# Patient Record
Sex: FEMALE | Race: WHITE | Employment: OTHER | URBAN - METROPOLITAN AREA
[De-identification: names, ages, dates, MRNs, and addresses within clinical notes are randomized per-mention and may not be internally consistent; named-entity substitution may affect disease eponyms.]

---

## 2017-02-24 ENCOUNTER — HOSPITAL ENCOUNTER (OUTPATIENT)
Dept: LAB | Age: 82
Discharge: HOME | End: 2017-02-24
Payer: MEDICARE

## 2017-02-24 PROCEDURE — 88305 TISSUE EXAM BY PATHOLOGIST: CPT | Performed by: PLASTIC SURGERY

## 2017-03-13 RX ORDER — LEVOCETIRIZINE DIHYDROCHLORIDE 5 MG/1
5 TABLET, FILM COATED ORAL DAILY
Qty: 30 TAB | Refills: 5 | Status: SHIPPED | OUTPATIENT
Start: 2017-03-13 | End: 2018-04-19 | Stop reason: ALTCHOICE

## 2017-04-18 ENCOUNTER — HOSPITAL ENCOUNTER (OUTPATIENT)
Dept: LAB | Age: 82
Discharge: HOME | End: 2017-04-18
Payer: MEDICARE

## 2017-04-18 ENCOUNTER — LAB ONLY (OUTPATIENT)
Dept: INTERNAL MEDICINE CLINIC | Age: 82
End: 2017-04-18

## 2017-04-18 DIAGNOSIS — R63.5 ABNORMAL WEIGHT GAIN: ICD-10-CM

## 2017-04-18 DIAGNOSIS — G89.29 CHRONIC LOW BACK PAIN WITHOUT SCIATICA, UNSPECIFIED BACK PAIN LATERALITY: ICD-10-CM

## 2017-04-18 DIAGNOSIS — G89.29 CHRONIC LOW BACK PAIN WITHOUT SCIATICA, UNSPECIFIED BACK PAIN LATERALITY: Primary | ICD-10-CM

## 2017-04-18 DIAGNOSIS — M54.50 CHRONIC LOW BACK PAIN WITHOUT SCIATICA, UNSPECIFIED BACK PAIN LATERALITY: Primary | ICD-10-CM

## 2017-04-18 DIAGNOSIS — M54.50 CHRONIC LOW BACK PAIN WITHOUT SCIATICA, UNSPECIFIED BACK PAIN LATERALITY: ICD-10-CM

## 2017-04-18 LAB
BASOPHILS # BLD AUTO: 0 K/UL (ref 0–0.06)
BASOPHILS # BLD: 1 % (ref 0–2)
CHOLEST SERPL-MCNC: 164 MG/DL
DIFFERENTIAL METHOD BLD: ABNORMAL
EOSINOPHIL # BLD: 0.1 K/UL (ref 0–0.4)
EOSINOPHIL NFR BLD: 2 % (ref 0–5)
ERYTHROCYTE [DISTWIDTH] IN BLOOD BY AUTOMATED COUNT: 13.4 % (ref 11.6–14.5)
HCT VFR BLD AUTO: 39.6 % (ref 35–45)
HDLC SERPL-MCNC: 60 MG/DL (ref 40–60)
HDLC SERPL: 2.7 {RATIO} (ref 0–5)
HGB BLD-MCNC: 12.5 G/DL (ref 12–16)
LDLC SERPL CALC-MCNC: 90.8 MG/DL (ref 0–100)
LIPID PROFILE,FLP: NORMAL
LYMPHOCYTES # BLD AUTO: 25 % (ref 21–52)
LYMPHOCYTES # BLD: 0.8 K/UL (ref 0.9–3.6)
MCH RBC QN AUTO: 31 PG (ref 24–34)
MCHC RBC AUTO-ENTMCNC: 31.6 G/DL (ref 31–37)
MCV RBC AUTO: 98.3 FL (ref 74–97)
MONOCYTES # BLD: 0.4 K/UL (ref 0.05–1.2)
MONOCYTES NFR BLD AUTO: 12 % (ref 3–10)
NEUTS SEG # BLD: 1.8 K/UL (ref 1.8–8)
NEUTS SEG NFR BLD AUTO: 60 % (ref 40–73)
PLATELET # BLD AUTO: 191 K/UL (ref 135–420)
PMV BLD AUTO: 11.5 FL (ref 9.2–11.8)
RBC # BLD AUTO: 4.03 M/UL (ref 4.2–5.3)
TRIGL SERPL-MCNC: 66 MG/DL (ref ?–150)
TSH SERPL DL<=0.05 MIU/L-ACNC: 1.49 UIU/ML (ref 0.36–3.74)
VLDLC SERPL CALC-MCNC: 13.2 MG/DL
WBC # BLD AUTO: 3 K/UL (ref 4.6–13.2)

## 2017-04-18 PROCEDURE — 85025 COMPLETE CBC W/AUTO DIFF WBC: CPT | Performed by: INTERNAL MEDICINE

## 2017-04-18 PROCEDURE — 83921 ORGANIC ACID SINGLE QUANT: CPT | Performed by: INTERNAL MEDICINE

## 2017-04-18 PROCEDURE — 80061 LIPID PANEL: CPT | Performed by: INTERNAL MEDICINE

## 2017-04-18 PROCEDURE — 84443 ASSAY THYROID STIM HORMONE: CPT | Performed by: INTERNAL MEDICINE

## 2017-04-19 ENCOUNTER — LAB ONLY (OUTPATIENT)
Dept: INTERNAL MEDICINE CLINIC | Age: 82
End: 2017-04-19

## 2017-04-19 ENCOUNTER — HOSPITAL ENCOUNTER (OUTPATIENT)
Dept: LAB | Age: 82
Discharge: HOME | End: 2017-04-19
Payer: MEDICARE

## 2017-04-19 DIAGNOSIS — R30.0 DYSURIA: Primary | ICD-10-CM

## 2017-04-19 DIAGNOSIS — R30.0 DYSURIA: ICD-10-CM

## 2017-04-19 PROCEDURE — 87086 URINE CULTURE/COLONY COUNT: CPT | Performed by: INTERNAL MEDICINE

## 2017-04-19 PROCEDURE — 87186 SC STD MICRODIL/AGAR DIL: CPT | Performed by: INTERNAL MEDICINE

## 2017-04-19 PROCEDURE — 87077 CULTURE AEROBIC IDENTIFY: CPT | Performed by: INTERNAL MEDICINE

## 2017-04-20 LAB — METHYLMALONATE SERPL-SCNC: 246 NMOL/L (ref 0–378)

## 2017-04-21 LAB
BACTERIA SPEC CULT: ABNORMAL
SERVICE CMNT-IMP: ABNORMAL

## 2017-04-25 ENCOUNTER — OFFICE VISIT (OUTPATIENT)
Dept: INTERNAL MEDICINE CLINIC | Age: 82
End: 2017-04-25

## 2017-04-25 VITALS
WEIGHT: 126 LBS | HEIGHT: 66 IN | BODY MASS INDEX: 20.25 KG/M2 | OXYGEN SATURATION: 97 % | HEART RATE: 65 BPM | DIASTOLIC BLOOD PRESSURE: 78 MMHG | TEMPERATURE: 98.4 F | RESPIRATION RATE: 16 BRPM | SYSTOLIC BLOOD PRESSURE: 110 MMHG

## 2017-04-25 DIAGNOSIS — R44.8 FEELS COLD: ICD-10-CM

## 2017-04-25 DIAGNOSIS — N30.00 ACUTE CYSTITIS WITHOUT HEMATURIA: Primary | ICD-10-CM

## 2017-04-25 RX ORDER — NITROFURANTOIN 25; 75 MG/1; MG/1
CAPSULE ORAL
Qty: 14 CAP | Refills: 0 | Status: SHIPPED | OUTPATIENT
Start: 2017-04-25 | End: 2017-08-08 | Stop reason: SDUPTHER

## 2017-04-25 NOTE — MR AVS SNAPSHOT
Visit Information Date & Time Provider Department Dept. Phone Encounter #  
 4/25/2017  9:30 AM Wade Perez MD [facility] INTERNAL MEDICINE [facility] [phone] [id number] Follow-up Instructions Return in about 6 months (around 10/25/2017). Upcoming Health Maintenance Date Due DTaP/Tdap/Td series (1 - Tdap) 10/27/1958 GLAUCOMA SCREENING Q2Y 10/27/2002 MEDICARE YEARLY EXAM 10/27/2002 Allergies as of 4/25/2017  Review Complete On: 4/25/2017 By: Roseanna Dias LPN Severity Noted Reaction Type Reactions Latex High 07/22/2014    Anaphylaxis Ampicillin Medium  Side Effect Hives Codeine Medium  Side Effect Nausea and Vomiting Doxycycline Medium 06/18/2013   Side Effect Hives States can take with benadryl Macrodantin [Nitrofurantoin Macrocrystalline] Medium  Side Effect Hives Other Medication Medium  Side Effect Hives Thallium dyes Pcn [Penicillins] Medium  Side Effect Hives Other reaction(s): mild rash/itching Sulfa (Sulfonamide Antibiotics) Medium  Side Effect Hives Other reaction(s): mild rash/itching Tetracycline Medium 06/18/2013   Side Effect Hives Scopolamine    Other (comments) Other reaction(s): cardiac dysrhythmia Patient reports A-Fib arrythmia Thallium-201  07/22/2014    Rash Current Immunizations  Reviewed on 7/22/2014 Name Date Influenza High Dose Vaccine PF 10/25/2016 11:08 AM  
 Pneumococcal Conjugate (PCV-13) 12/2/2014 Pneumococcal Vaccine (Unspecified Type) 10/20/2004 Zoster Vaccine, Live 3/4/2008 Not reviewed this visit Vitals BP Pulse Temp Resp Height(growth percentile) 110/78 (BP 1 Location: Right arm, BP Patient Position: Sitting) 65 98.4 °F (36.9 °C) (Tympanic) 16 5' 6\" (1.676 m) Weight(growth percentile) SpO2 BMI OB Status Smoking Status 126 lb (57.2 kg) 97% 20.34 kg/m2 Hysterectomy Never Smoker Vitals History BMI and BSA Data Body Mass Index Body Surface Area  
 20.34 kg/m 2 1.63 m 2 Preferred Pharmacy Pharmacy Name Phone [address] [place] [address] [facility] [phone] Your Updated Medication List  
  
   
This list is accurate as of: 4/25/17 11:00 AM.  Always use your most recent med list.  
  
  
  
  
 ascorbic acid (vitamin C) 1,000 mg tablet Commonly known as:  VITAMIN C Take  by mouth. aspirin 81 mg tablet Take 81 mg by mouth. B12 SL  
by SubLINGual route two (2) times a day. calcium 600 mg Cap Take  by mouth two (2) times a day. CRANBERRY CONCENTRATE PO Take  by mouth two (2) times a day. ergocalciferol 50,000 unit capsule Commonly known as:  VITAMIN D2 Take one cap by mouth twice a month * estradiol 0.01 % (0.1 mg/gram) vaginal cream  
Commonly known as:  ESTRACE Apply small amount to urethral  Opening daily * estradiol 0.5 mg tablet Commonly known as:  ESTRACE  
1 tablet daily FISH OIL 1,000 mg Cap Generic drug:  omega-3 fatty acids-vitamin e Take 1 Cap by mouth two (2) times a day. fluocinoNIDE 0.05 % external solution Commonly known as:  LIDEX Apply to affected area daily or as directed. folic acid 992 mcg tablet Take 400 mcg by mouth daily. glucosamine-chondroitin 2,000-1,200 mg/30 mL Liqd Take  by mouth.  
  
 levocetirizine 5 mg tablet Commonly known as:  [place] Take 1 Tab by mouth daily. magnesium oxide 400 mg tablet Commonly known as:  MAG-OX Take 400 mg by mouth daily. nicotinic acid 500 mg tablet Commonly known as:  NIACIN Take 500 mg by mouth Daily (before breakfast). nitrofurantoin (macrocrystal-monohydrate) 100 mg capsule Commonly known as:  MACROBID  
1 tablet twice per day OTHER  
2 Tabs two (2) times a day. Macular Protect Complete vitamin * Notice: This list has 2 medication(s) that are the same as other medications prescribed for you. Read the directions carefully, and ask your doctor or other care provider to review them with you. Prescriptions Sent to Pharmacy Refills  
 nitrofurantoin, macrocrystal-monohydrate, (MACROBID) 100 mg capsule 0 Si tablet twice per day Class: Normal  
 Pharmacy: [address] [place] [address] #: [phone] Follow-up Instructions Return in about 6 months (around 10/25/2017). Introducing [facility] SERVICES! [facility] [place] introduces [employer] patient portal. Now you can access parts of your medical record, email your doctor's office, and request medication refills online. 1. In your internet browser, go to [email or web address] 2. Click on the First Time User? Click Here link in the Sign In box. You will see the New Member Sign Up page. 3. Enter your Cellwitch Access Code exactly as it appears below. You will not need to use this code after youve completed the sign-up process. If you do not sign up before the expiration date, you must request a new code. · Cellwitch Access Code: THE [facility] [place] Expires: 2017  8:11 PM 
 
4. Enter the last four digits of your Social Security Number (xxxx) and Date of Birth (mm/dd/yyyy) as indicated and click Submit. You will be taken to the next sign-up page. 5. Create a M-Changat ID. This will be your Cellwitch login ID and cannot be changed, so think of one that is secure and easy to remember. 6. Create a Cellwitch password. You can change your password at any time. 7. Enter your Password Reset Question and Answer. This can be used at a later time if you forget your password. 8. Enter your e-mail address. You will receive e-mail notification when new information is available in [address] 9. Click Sign Up.  You can now view and download portions of your medical record. 10. Click the Download Summary menu link to download a portable copy of your medical information. If you have questions, please visit the Frequently Asked Questions section of the Purplu website. Remember, Purplu is NOT to be used for urgent needs. For medical emergencies, dial 911. Now available from your iPhone and Android! Please provide this summary of care documentation to your next provider. Your primary care clinician is listed as Kemal Mills. If you have any questions after today's visit, please call [phone].

## 2017-04-25 NOTE — PROGRESS NOTES
1. Have you been to the ER, urgent care clinic since your last visit? Hospitalized since your last visit? No    2. Have you seen or consulted any other health care providers outside of the [address] [place] [address] since your last visit? Include any pap smears or colon screening.  No

## 2017-04-26 NOTE — PROGRESS NOTES
This is a Subsequent Medicare Annual Wellness Visit providing Personalized Prevention Plan Services (PPPS) (Performed 12 months after initial AWV and PPPS )    I have reviewed the patient's medical history in detail and updated the computerized patient record. History     The patient complains of urinary frequency and nocturia. The patient has had recurrent UTIs. The continues to feel cold. Past Medical History:   Diagnosis Date    Abnormal weight gain     Feels cold     Insect bite of hip, infected     Mitral valve disorder     MVP (mitral valve prolapse)     Nausea & vomiting       Past Surgical History:   Procedure Laterality Date    ENDOSCOPY, COLON, DIAGNOSTIC  03/23/07    mild internal hemorrhoids    HX BREAST LUMPECTOMY      left    HX CATARACT REMOVAL      bilaterally with lens implant    HX HYSTERECTOMY      HX OOPHORECTOMY      HX OTHER SURGICAL      bladder surgery - sling made with muscle from leg    TOTAL KNEE ARTHROPLASTY      right     Current Outpatient Prescriptions   Medication Sig Dispense Refill    nitrofurantoin, macrocrystal-monohydrate, (MACROBID) 100 mg capsule 1 tablet twice per day 14 Cap 0    levocetirizine (XYZAL) 5 mg tablet Take 1 Tab by mouth daily. 30 Tab 5    ascorbic acid (VITAMIN C) 1,000 mg tablet Take  by mouth.  ergocalciferol (VITAMIN D2) 50,000 unit capsule Take one cap by mouth twice a month 6 Cap 5    estradiol (ESTRACE) 0.01 % (0.1 mg/gram) vaginal cream Apply small amount to urethral  Opening daily 85 g 3    OTHER 2 Tabs two (2) times a day. Macular Protect Complete vitamin      omega-3 fatty acids-vitamin e (FISH OIL) 1,000 mg Cap Take 1 Cap by mouth two (2) times a day.  calcium 600 mg Cap Take  by mouth two (2) times a day.  GLUCOSAMINE HCL/CHONDRO RAMIREZ A (GLUCOSAMINE-CHONDROITIN) 2,000-1,200 mg/30 mL Liqd Take  by mouth.  CYANOCOBALAMIN/COBAMAMIDE (B12 SL) by SubLINGual route two (2) times a day.       aspirin 81 mg tablet Take 81 mg by mouth.  magnesium oxide (MAG-OX) 400 mg tablet Take 400 mg by mouth daily.  folic acid 490 mcg tablet Take 400 mcg by mouth daily.  ASCORBIC ACID/VITAMIN E (CRANBERRY CONCENTRATE PO) Take  by mouth two (2) times a day. Allergies   Allergen Reactions    Latex Anaphylaxis    Ampicillin Hives    Codeine Nausea and Vomiting    Doxycycline Hives     States can take with benadryl    Macrodantin [Nitrofurantoin Macrocrystalline] Hives    Other Medication Hives     Thallium dyes    Pcn [Penicillins] Hives     Other reaction(s): mild rash/itching    Sulfa (Sulfonamide Antibiotics) Hives     Other reaction(s): mild rash/itching    Tetracycline Hives    Scopolamine Other (comments)     Other reaction(s): cardiac dysrhythmia  Patient reports A-Fib arrythmia    Thallium-201 Rash     Family History   Problem Relation Age of Onset    Cancer Mother      gastric    Heart Disease Father      Social History   Substance Use Topics    Smoking status: Never Smoker    Smokeless tobacco: Never Used    Alcohol use No     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 No    Do you average more than 7 drinks per week? No      Functional Ability and Level of Safety:     Hearing Loss   mild    Activities of Daily Living   Self-care.    Requires assistance with: no ADLs    Fall Risk     Fall Risk Assessment, last 12 mths 8/30/2016   Able to walk? Yes   Fall in past 12 months? No     Abuse Screen   Patient is not abused    Review of Systems   A comprehensive review of systems was negative except for that written in the HPI. Physical Examination     Evaluation of Cognitive Function:  Mood/affect:  neutral  Appearance: age appropriate  Family member/caregiver input: None    Visit Vitals    /78 (BP 1 Location: Right arm, BP Patient Position: Sitting)    Pulse 65    Temp 98.4 °F (36.9 °C) (Tympanic)    Resp 16    Ht 5' 6\" (1.676 m)    Wt 126 lb (57.2 kg)    SpO2 97%    BMI 20.34 kg/m2     General appearance: alert, cooperative, no distress, appears stated age  Neck: supple, symmetrical, trachea midline, no adenopathy, thyroid: not enlarged, symmetric, no tenderness/mass/nodules, no carotid bruit and no JVD  Back: symmetric, no curvature. ROM normal. No CVA tenderness. Lungs: clear to auscultation bilaterally  Heart: regular rate and rhythm, S1, S2 normal, no murmur, click, rub or gallop  Abdomen: soft, non-tender. Bowel sounds normal. No masses,  no organomegaly  Extremities: extremities normal, atraumatic, no cyanosis or edema  Pulses: 2+ and symmetric  Lymph nodes: Cervical, supraclavicular, and axillary nodes normal.    Patient Care Team:  Darline Lopez MD as PCP - General (Internal Medicine)  Ned Rosario MD as Physician (Urology)    Advice/Referrals/Counseling   Education and counseling provided:  Are appropriate based on today's review and evaluation  The patient was advised and counseled regarding advanced directives    Assessment/Plan       ICD-10-CM ICD-9-CM    1. Acute cystitis without hematuria N30.00 595.0    2.  Feels cold R68.89 780.99      Macrobid  she was advised to continue her maintenance medications  To urology if symptoms persist    Schedule next appointment for 6 months    I asked Amarjit Santacruz if she has any questions and I answered the questions.   Ava Mike states that she understands the treatment plan and agrees with the treatment plan

## 2017-05-02 RX ORDER — FLUCONAZOLE 150 MG/1
TABLET ORAL
Qty: 2 TAB | Refills: 0 | Status: SHIPPED | OUTPATIENT
Start: 2017-05-02 | End: 2017-08-08 | Stop reason: SDUPTHER

## 2017-05-02 NOTE — TELEPHONE ENCOUNTER
Was given antibiotic last week, now has yeast infection, wants RX called in for that, wants the one she only has to take 1 tab.  [facility] on [address]

## 2017-08-08 ENCOUNTER — TELEPHONE (OUTPATIENT)
Dept: INTERNAL MEDICINE CLINIC | Age: 82
End: 2017-08-08

## 2017-08-08 RX ORDER — FLUCONAZOLE 150 MG/1
TABLET ORAL
Qty: 2 TAB | Refills: 0 | Status: SHIPPED | OUTPATIENT
Start: 2017-08-08 | End: 2017-09-13 | Stop reason: SDUPTHER

## 2017-08-08 RX ORDER — NITROFURANTOIN 25; 75 MG/1; MG/1
CAPSULE ORAL
Qty: 14 CAP | Refills: 0 | Status: SHIPPED | OUTPATIENT
Start: 2017-08-08 | End: 2017-09-21 | Stop reason: ALTCHOICE

## 2017-08-22 ENCOUNTER — HOSPITAL ENCOUNTER (OUTPATIENT)
Dept: LAB | Age: 82
Discharge: HOME | End: 2017-08-22
Payer: MEDICARE

## 2017-08-22 DIAGNOSIS — R30.0 DYSURIA: ICD-10-CM

## 2017-08-22 LAB
APPEARANCE UR: CLEAR
BACTERIA URNS QL MICRO: NEGATIVE /HPF
BILIRUB UR QL: NEGATIVE
COLOR UR: YELLOW
EPITH CASTS URNS QL MICRO: ABNORMAL /LPF (ref 0–5)
GLUCOSE UR STRIP.AUTO-MCNC: NEGATIVE MG/DL
HGB UR QL STRIP: NEGATIVE
KETONES UR QL STRIP.AUTO: NEGATIVE MG/DL
LEUKOCYTE ESTERASE UR QL STRIP.AUTO: ABNORMAL
NITRITE UR QL STRIP.AUTO: NEGATIVE
PH UR STRIP: 6 [PH] (ref 5–8)
PROT UR STRIP-MCNC: NEGATIVE MG/DL
RBC #/AREA URNS HPF: 0 /HPF (ref 0–5)
SP GR UR REFRACTOMETRY: 1.02 (ref 1–1.03)
UROBILINOGEN UR QL STRIP.AUTO: 0.2 EU/DL (ref 0.2–1)
WBC URNS QL MICRO: ABNORMAL /HPF (ref 0–4)

## 2017-08-22 PROCEDURE — 87086 URINE CULTURE/COLONY COUNT: CPT | Performed by: INTERNAL MEDICINE

## 2017-08-22 PROCEDURE — 81001 URINALYSIS AUTO W/SCOPE: CPT | Performed by: INTERNAL MEDICINE

## 2017-08-24 LAB
BACTERIA SPEC CULT: NORMAL
SERVICE CMNT-IMP: NORMAL

## 2017-09-13 RX ORDER — FLUCONAZOLE 150 MG/1
TABLET ORAL
Qty: 2 TAB | Refills: 0 | Status: SHIPPED | OUTPATIENT
Start: 2017-09-13 | End: 2017-10-12

## 2017-09-21 ENCOUNTER — HOSPITAL ENCOUNTER (OUTPATIENT)
Dept: LAB | Age: 82
Discharge: HOME | End: 2017-09-21
Payer: MEDICARE

## 2017-09-21 ENCOUNTER — OFFICE VISIT (OUTPATIENT)
Dept: INTERNAL MEDICINE CLINIC | Age: 82
End: 2017-09-21

## 2017-09-21 VITALS
TEMPERATURE: 97.8 F | HEIGHT: 66 IN | DIASTOLIC BLOOD PRESSURE: 64 MMHG | RESPIRATION RATE: 16 BRPM | BODY MASS INDEX: 20.57 KG/M2 | OXYGEN SATURATION: 98 % | SYSTOLIC BLOOD PRESSURE: 120 MMHG | WEIGHT: 128 LBS | HEART RATE: 76 BPM

## 2017-09-21 DIAGNOSIS — R31.9 URINARY TRACT INFECTION WITH HEMATURIA, SITE UNSPECIFIED: ICD-10-CM

## 2017-09-21 DIAGNOSIS — N39.0 URINARY TRACT INFECTION WITH HEMATURIA, SITE UNSPECIFIED: ICD-10-CM

## 2017-09-21 DIAGNOSIS — N39.0 URINARY TRACT INFECTION WITH HEMATURIA, SITE UNSPECIFIED: Primary | ICD-10-CM

## 2017-09-21 DIAGNOSIS — R31.9 URINARY TRACT INFECTION WITH HEMATURIA, SITE UNSPECIFIED: Primary | ICD-10-CM

## 2017-09-21 LAB
APPEARANCE UR: ABNORMAL
BACTERIA URNS QL MICRO: ABNORMAL /HPF
BILIRUB UR QL STRIP: NEGATIVE
BILIRUB UR QL: NEGATIVE
COLOR UR: ABNORMAL
EPITH CASTS URNS QL MICRO: ABNORMAL /LPF (ref 0–5)
GLUCOSE UR STRIP.AUTO-MCNC: NEGATIVE MG/DL
GLUCOSE UR-MCNC: NEGATIVE MG/DL
HGB UR QL STRIP: NEGATIVE
KETONES P FAST UR STRIP-MCNC: NEGATIVE MG/DL
KETONES UR QL STRIP.AUTO: NEGATIVE MG/DL
LEUKOCYTE ESTERASE UR QL STRIP.AUTO: ABNORMAL
NITRITE UR QL STRIP.AUTO: POSITIVE
PH UR STRIP: 5.5 [PH] (ref 4.6–8)
PH UR STRIP: 6 [PH] (ref 5–8)
PROT UR QL STRIP: NEGATIVE MG/DL
PROT UR STRIP-MCNC: NEGATIVE MG/DL
RBC #/AREA URNS HPF: NEGATIVE /HPF (ref 0–5)
SP GR UR REFRACTOMETRY: 1.01 (ref 1–1.03)
SP GR UR STRIP: 1.01 (ref 1–1.03)
UA UROBILINOGEN AMB POC: NORMAL (ref 0.2–1)
URINALYSIS CLARITY POC: CLEAR
URINALYSIS COLOR POC: YELLOW
URINE BLOOD POC: NORMAL
URINE LEUKOCYTES POC: NORMAL
URINE NITRITES POC: POSITIVE
UROBILINOGEN UR QL STRIP.AUTO: 0.2 EU/DL (ref 0.2–1)
WBC URNS QL MICRO: ABNORMAL /HPF (ref 0–4)

## 2017-09-21 PROCEDURE — 87077 CULTURE AEROBIC IDENTIFY: CPT | Performed by: NURSE PRACTITIONER

## 2017-09-21 PROCEDURE — 87186 SC STD MICRODIL/AGAR DIL: CPT | Performed by: NURSE PRACTITIONER

## 2017-09-21 PROCEDURE — 87086 URINE CULTURE/COLONY COUNT: CPT | Performed by: NURSE PRACTITIONER

## 2017-09-21 PROCEDURE — 81001 URINALYSIS AUTO W/SCOPE: CPT | Performed by: NURSE PRACTITIONER

## 2017-09-21 RX ORDER — NITROFURANTOIN 25; 75 MG/1; MG/1
100 CAPSULE ORAL 2 TIMES DAILY
Qty: 14 CAP | Refills: 0 | Status: SHIPPED | OUTPATIENT
Start: 2017-09-21 | End: 2017-10-12

## 2017-09-21 RX ORDER — PHENAZOPYRIDINE HYDROCHLORIDE 100 MG/1
100 TABLET, FILM COATED ORAL
Qty: 9 TAB | Refills: 0 | Status: SHIPPED | OUTPATIENT
Start: 2017-09-21 | End: 2017-09-24

## 2017-09-21 NOTE — PROGRESS NOTES
Patient presents for   Chief Complaint   Patient presents with    Urinary Pain     Fall risk assessment was not indicated. Depression screening was not indicated Follow up questions were not indicated. 1. Have you been to the ER, urgent care clinic since your last visit? Hospitalized since your last visit? No    2. Have you seen or consulted any other health care providers outside of the [address] [place] [address] since your last visit? Include any pap smears or colon screening.  No    Poc urine performed per provider order, provider made aware of results

## 2017-09-21 NOTE — PROGRESS NOTES
Jose Wood is a 80 y.o. female presenting today for Urinary Pain  . HPI:  Jose Wood presents to the office today for urinary symptoms. Patient is complaining of waking up Sunday morning with burning sensation that she associated with a yeast infection. She was seen at her gyn and was given Fluconazole. Patient noted the symptoms have not improved. She is complaining mostly of dysuria. Review of Systems   Respiratory: Negative for cough. Cardiovascular: Negative for chest pain and palpitations. Genitourinary: Positive for dysuria. Allergies   Allergen Reactions    Latex Anaphylaxis    Ampicillin Hives    Codeine Nausea and Vomiting    Doxycycline Hives     States can take with benadryl    Macrodantin [Nitrofurantoin Macrocrystalline] Hives    Other Medication Hives     Thallium dyes    Pcn [Penicillins] Hives     Other reaction(s): mild rash/itching    Sulfa (Sulfonamide Antibiotics) Hives     Other reaction(s): mild rash/itching    Tetracycline Hives    Scopolamine Other (comments)     Other reaction(s): cardiac dysrhythmia  Patient reports A-Fib arrythmia    Thallium-201 Rash       Current Outpatient Prescriptions   Medication Sig Dispense Refill    nitrofurantoin, macrocrystal-monohydrate, (MACROBID) 100 mg capsule Take 1 Cap by mouth two (2) times a day. 14 Cap 0    phenazopyridine (PYRIDIUM) 100 mg tablet Take 1 Tab by mouth three (3) times daily (after meals) for 3 days. 9 Tab 0    fluconazole (DIFLUCAN) 150 mg tablet Take 1 tablet by mouth at the onset of symptoms. May repeat in 3 days 2 Tab 0    levocetirizine (XYZAL) 5 mg tablet Take 1 Tab by mouth daily. 30 Tab 5    ascorbic acid (VITAMIN C) 1,000 mg tablet Take  by mouth.       ergocalciferol (VITAMIN D2) 50,000 unit capsule Take one cap by mouth twice a month 6 Cap 5    estradiol (ESTRACE) 0.01 % (0.1 mg/gram) vaginal cream Apply small amount to urethral  Opening daily 85 g 3    OTHER 2 Tabs two (2) times a day. Macular Protect Complete vitamin      omega-3 fatty acids-vitamin e (FISH OIL) 1,000 mg Cap Take 1 Cap by mouth two (2) times a day.  calcium 600 mg Cap Take  by mouth two (2) times a day.  GLUCOSAMINE HCL/CHONDRO RAMIREZ A (GLUCOSAMINE-CHONDROITIN) 2,000-1,200 mg/30 mL Liqd Take  by mouth.  CYANOCOBALAMIN/COBAMAMIDE (B12 SL) by SubLINGual route two (2) times a day.  aspirin 81 mg tablet Take 81 mg by mouth.  magnesium oxide (MAG-OX) 400 mg tablet Take 400 mg by mouth daily.  folic acid 007 mcg tablet Take 400 mcg by mouth daily.  ASCORBIC ACID/VITAMIN E (CRANBERRY CONCENTRATE PO) Take  by mouth two (2) times a day. Past Medical History:   Diagnosis Date    Abnormal weight gain     Feels cold     Insect bite of hip, infected     Mitral valve disorder     MVP (mitral valve prolapse)     Nausea & vomiting        Past Surgical History:   Procedure Laterality Date    ENDOSCOPY, COLON, DIAGNOSTIC  03/23/07    mild internal hemorrhoids    HX BREAST LUMPECTOMY      left    HX CATARACT REMOVAL      bilaterally with lens implant    HX HYSTERECTOMY      HX OOPHORECTOMY      HX OTHER SURGICAL      bladder surgery - sling made with muscle from leg    TOTAL KNEE ARTHROPLASTY      right       Social History     Social History    Marital status:      Spouse name: N/A    Number of children: N/A    Years of education: N/A     Occupational History    Not on file.      Social History Main Topics    Smoking status: Never Smoker    Smokeless tobacco: Never Used    Alcohol use No    Drug use: No    Sexual activity: No     Other Topics Concern    Not on file     Social History Narrative       Patient does not have an advanced directive on file    Vitals:    09/21/17 0835   BP: 120/64   Pulse: 76   Resp: 16   Temp: 97.8 °F (36.6 °C)   TempSrc: Tympanic   SpO2: 98%   Weight: 128 lb (58.1 kg)   Height: 5' 6\" (1.676 m)   PainSc:   0 - No pain       Physical Exam Constitutional: No distress. Cardiovascular: Normal rate, regular rhythm and normal heart sounds. Pulmonary/Chest: Effort normal and breath sounds normal.   Nursing note and vitals reviewed.       Office Visit on 09/21/2017   Component Date Value Ref Range Status    Color (UA POC) 09/21/2017 Yellow   Final    Clarity (UA POC) 09/21/2017 Clear   Final    Glucose (UA POC) 09/21/2017 Negative  Negative Final    Bilirubin (UA POC) 09/21/2017 Negative  Negative Final    Ketones (UA POC) 09/21/2017 Negative  Negative Final    Specific gravity (UA POC) 09/21/2017 1.010  1.001 - 1.035 Final    Blood (UA POC) 09/21/2017 Trace  Negative Final    pH (UA POC) 09/21/2017 5.5  4.6 - 8.0 Final    Protein (UA POC) 09/21/2017 Negative  Negative mg/dL Final    Urobilinogen (UA POC) 09/21/2017 0.2 mg/dL  0.2 - 1 Final    Nitrites (UA POC) 09/21/2017 Positive  Negative Final    Leukocyte esterase (UA POC) 09/21/2017 Trace  Negative Final   Hospital Outpatient Visit on 08/22/2017   Component Date Value Ref Range Status    Color 08/22/2017 YELLOW    Final    Appearance 08/22/2017 CLEAR    Final    Specific gravity 08/22/2017 1.017  1.005 - 1.030   Final    pH (UA) 08/22/2017 6.0  5.0 - 8.0   Final    Protein 08/22/2017 NEGATIVE   NEG mg/dL Final    Glucose 08/22/2017 NEGATIVE   NEG mg/dL Final    Ketone 08/22/2017 NEGATIVE   NEG mg/dL Final    Bilirubin 08/22/2017 NEGATIVE   NEG   Final    Blood 08/22/2017 NEGATIVE   NEG   Final    Urobilinogen 08/22/2017 0.2  0.2 - 1.0 EU/dL Final    Nitrites 08/22/2017 NEGATIVE   NEG   Final    Leukocyte Esterase 08/22/2017 SMALL* NEG   Final    WBC 08/22/2017 4 to 6  0 - 4 /hpf Final    RBC 08/22/2017 0  0 - 5 /hpf Final    Epithelial cells 08/22/2017 FEW  0 - 5 /lpf Final    Bacteria 08/22/2017 NEGATIVE   NEG /hpf Final    Special Requests: 08/22/2017 NO SPECIAL REQUESTS    Final    Culture result: 08/22/2017 NO GROWTH 2 DAYS    Final       .  Results for orders placed or performed in visit on 09/21/17   AMB POC URINALYSIS DIP STICK AUTO W/O MICRO   Result Value Ref Range    Color (UA POC) Yellow     Clarity (UA POC) Clear     Glucose (UA POC) Negative Negative    Bilirubin (UA POC) Negative Negative    Ketones (UA POC) Negative Negative    Specific gravity (UA POC) 1.010 1.001 - 1.035    Blood (UA POC) Trace Negative    pH (UA POC) 5.5 4.6 - 8.0    Protein (UA POC) Negative Negative mg/dL    Urobilinogen (UA POC) 0.2 mg/dL 0.2 - 1    Nitrites (UA POC) Positive Negative    Leukocyte esterase (UA POC) Trace Negative       Assessment / Plan:      ICD-10-CM ICD-9-CM    1. Urinary tract infection with hematuria, site unspecified N39.0 599.0 nitrofurantoin, macrocrystal-monohydrate, (MACROBID) 100 mg capsule    R31.9  CULTURE, URINE      phenazopyridine (PYRIDIUM) 100 mg tablet      URINALYSIS W/MICROSCOPIC      AMB POC URINALYSIS DIP STICK AUTO W/O MICRO     POC UA- trace hematuria, trace Leuks, Nitrites  Macrobid- patient noted she was able to tolerate the last dose of macrobid. She took Benadryl with medication  Pyridium rx  Micro  Culture ordered  F/u for repeat urine    Follow-up Disposition:  Return if symptoms worsen or fail to improve. I asked the patient if she  had any questions and answered her  questions.   The patient stated that she understands the treatment plan and agrees with the treatment plan

## 2017-09-23 LAB
BACTERIA SPEC CULT: ABNORMAL
SERVICE CMNT-IMP: ABNORMAL

## 2017-09-26 DIAGNOSIS — Z87.440 HISTORY OF RECURRENT UTI (URINARY TRACT INFECTION): Primary | ICD-10-CM

## 2017-10-11 ENCOUNTER — HOSPITAL ENCOUNTER (OUTPATIENT)
Dept: LAB | Age: 82
Discharge: HOME | End: 2017-10-11
Payer: MEDICARE

## 2017-10-11 ENCOUNTER — CLINICAL SUPPORT (OUTPATIENT)
Dept: INTERNAL MEDICINE CLINIC | Age: 82
End: 2017-10-11

## 2017-10-11 DIAGNOSIS — R30.0 BURNING WITH URINATION: Primary | ICD-10-CM

## 2017-10-11 DIAGNOSIS — R30.0 BURNING WITH URINATION: ICD-10-CM

## 2017-10-11 LAB
APPEARANCE UR: ABNORMAL
BACTERIA URNS QL MICRO: ABNORMAL /HPF
BILIRUB UR QL STRIP: NEGATIVE
BILIRUB UR QL: NEGATIVE
COLOR UR: ABNORMAL
EPITH CASTS URNS QL MICRO: ABNORMAL /LPF (ref 0–5)
GLUCOSE UR STRIP.AUTO-MCNC: NEGATIVE MG/DL
GLUCOSE UR-MCNC: NEGATIVE MG/DL
HGB UR QL STRIP: ABNORMAL
KETONES P FAST UR STRIP-MCNC: NEGATIVE MG/DL
KETONES UR QL STRIP.AUTO: NEGATIVE MG/DL
LEUKOCYTE ESTERASE UR QL STRIP.AUTO: ABNORMAL
NITRITE UR QL STRIP.AUTO: POSITIVE
PH UR STRIP: 5 [PH] (ref 4.6–8)
PH UR STRIP: 5 [PH] (ref 5–8)
PROT UR QL STRIP: NEGATIVE MG/DL
PROT UR STRIP-MCNC: NEGATIVE MG/DL
RBC #/AREA URNS HPF: 0 /HPF (ref 0–5)
SP GR UR REFRACTOMETRY: 1.02 (ref 1–1.03)
SP GR UR STRIP: 1.01 (ref 1–1.03)
UA UROBILINOGEN AMB POC: NORMAL (ref 0.2–1)
URINALYSIS CLARITY POC: CLEAR
URINALYSIS COLOR POC: NORMAL
URINE BLOOD POC: NORMAL
URINE LEUKOCYTES POC: NORMAL
URINE NITRITES POC: POSITIVE
UROBILINOGEN UR QL STRIP.AUTO: 1 EU/DL (ref 0.2–1)
WBC URNS QL MICRO: ABNORMAL /HPF (ref 0–4)

## 2017-10-11 PROCEDURE — 87186 SC STD MICRODIL/AGAR DIL: CPT | Performed by: NURSE PRACTITIONER

## 2017-10-11 PROCEDURE — 87077 CULTURE AEROBIC IDENTIFY: CPT | Performed by: NURSE PRACTITIONER

## 2017-10-11 PROCEDURE — 81001 URINALYSIS AUTO W/SCOPE: CPT | Performed by: NURSE PRACTITIONER

## 2017-10-11 PROCEDURE — 87086 URINE CULTURE/COLONY COUNT: CPT | Performed by: NURSE PRACTITIONER

## 2017-10-11 NOTE — PROGRESS NOTES
Ms Cirilo Barraza presents to office with burning due to urination POC urine performed. Dr Keely Chan made aware of results but due to patient taking AZO Dipstick may not be accurate, Urinalysis and culture ordered per Dr Alex Horton verbal order. Ms Rossy Overton expressed understanding when this was explained and left office ambulatory.

## 2017-10-12 ENCOUNTER — TELEPHONE (OUTPATIENT)
Dept: INTERNAL MEDICINE CLINIC | Age: 82
End: 2017-10-12

## 2017-10-12 DIAGNOSIS — N30.01 ACUTE CYSTITIS WITH HEMATURIA: Primary | ICD-10-CM

## 2017-10-12 RX ORDER — CIPROFLOXACIN 500 MG/1
500 TABLET ORAL 2 TIMES DAILY
Qty: 14 TAB | Refills: 0 | Status: SHIPPED | OUTPATIENT
Start: 2017-10-12 | End: 2017-10-19

## 2017-10-12 NOTE — TELEPHONE ENCOUNTER
Ms Tanvir Villarreal was contacted and informed that cipro was sent to the pharmacy she was also warned not to run, jump or do any strenuous exercise due to possibility of achilles tendon rupture.  She expressed understanding

## 2017-10-13 LAB
BACTERIA SPEC CULT: ABNORMAL
SERVICE CMNT-IMP: ABNORMAL

## 2017-10-17 ENCOUNTER — OFFICE VISIT (OUTPATIENT)
Dept: INTERNAL MEDICINE CLINIC | Age: 82
End: 2017-10-17

## 2017-10-17 VITALS
TEMPERATURE: 97.3 F | OXYGEN SATURATION: 100 % | RESPIRATION RATE: 16 BRPM | HEIGHT: 66 IN | BODY MASS INDEX: 20.41 KG/M2 | HEART RATE: 60 BPM | SYSTOLIC BLOOD PRESSURE: 112 MMHG | WEIGHT: 127 LBS | DIASTOLIC BLOOD PRESSURE: 68 MMHG

## 2017-10-17 DIAGNOSIS — N39.0 RECURRENT UTI: ICD-10-CM

## 2017-10-17 DIAGNOSIS — Z78.0 MENOPAUSE: ICD-10-CM

## 2017-10-17 DIAGNOSIS — I05.9 MITRAL VALVE DISORDER: Primary | ICD-10-CM

## 2017-10-17 NOTE — PROGRESS NOTES
1. Have you been to the ER, urgent care clinic since your last visit? Hospitalized since your last visit? No    2. Have you seen or consulted any other health care providers outside of the [address] [place] [address] since your last visit? Include any pap smears or colon screening.  Dr. Ren Louis

## 2017-10-17 NOTE — PROGRESS NOTES
The patient presents to the office today with the chief complaint of UTI    HPI    The patient remains on an antibiotic for 2 more days due to a UTI. The patient has had recurrent UTIs. The patient has also been noted to have bradycardia down to 50 bpm.      ROS    Allergies   Allergen Reactions    Latex Anaphylaxis    Ampicillin Hives    Codeine Nausea and Vomiting    Doxycycline Hives     States can take with benadryl    Macrodantin [Nitrofurantoin Macrocrystalline] Hives    Other Medication Hives     Thallium dyes    Pcn [Penicillins] Hives     Other reaction(s): mild rash/itching    Sulfa (Sulfonamide Antibiotics) Hives     Other reaction(s): mild rash/itching    Tetracycline Hives    Scopolamine Other (comments)     Other reaction(s): cardiac dysrhythmia  Patient reports A-Fib arrythmia    Thallium-201 Rash       Current Outpatient Prescriptions   Medication Sig Dispense Refill    ciprofloxacin HCl (CIPRO) 500 mg tablet Take 1 Tab by mouth two (2) times a day for 7 days. 14 Tab 0    levocetirizine (XYZAL) 5 mg tablet Take 1 Tab by mouth daily. 30 Tab 5    ascorbic acid (VITAMIN C) 1,000 mg tablet Take  by mouth.  ergocalciferol (VITAMIN D2) 50,000 unit capsule Take one cap by mouth twice a month 6 Cap 5    estradiol (ESTRACE) 0.01 % (0.1 mg/gram) vaginal cream Apply small amount to urethral  Opening daily 85 g 3    OTHER 2 Tabs two (2) times a day. Macular Protect Complete vitamin      omega-3 fatty acids-vitamin e (FISH OIL) 1,000 mg Cap Take 1 Cap by mouth two (2) times a day.  calcium 600 mg Cap Take  by mouth two (2) times a day.  GLUCOSAMINE HCL/CHONDRO RAMIREZ A (GLUCOSAMINE-CHONDROITIN) 2,000-1,200 mg/30 mL Liqd Take  by mouth.  CYANOCOBALAMIN/COBAMAMIDE (B12 SL) by SubLINGual route two (2) times a day.  aspirin 81 mg tablet Take 81 mg by mouth.  magnesium oxide (MAG-OX) 400 mg tablet Take 400 mg by mouth daily.         folic acid 319 mcg tablet Take 400 mcg by mouth daily.  ASCORBIC ACID/VITAMIN E (CRANBERRY CONCENTRATE PO) Take  by mouth two (2) times a day. Past Medical History:   Diagnosis Date    Abnormal weight gain     Feels cold     Insect bite of hip, infected     Mitral valve disorder     MVP (mitral valve prolapse)     Nausea & vomiting        Past Surgical History:   Procedure Laterality Date    ENDOSCOPY, COLON, DIAGNOSTIC  03/23/07    mild internal hemorrhoids    HX BREAST LUMPECTOMY      left    HX CATARACT REMOVAL      bilaterally with lens implant    HX HYSTERECTOMY      HX OOPHORECTOMY      HX OTHER SURGICAL      bladder surgery - sling made with muscle from leg    TOTAL KNEE ARTHROPLASTY      right       Social History     Social History    Marital status:      Spouse name: N/A    Number of children: N/A    Years of education: N/A     Occupational History    Not on file. Social History Main Topics    Smoking status: Never Smoker    Smokeless tobacco: Never Used    Alcohol use No    Drug use: No    Sexual activity: No     Other Topics Concern    Not on file     Social History Narrative       Patient does not have an advanced directive on file    Visit Vitals    /68 (BP 1 Location: Left arm, BP Patient Position: Sitting)    Pulse 60    Temp 97.3 °F (36.3 °C) (Tympanic)    Resp 16    Ht 5' 6\" (1.676 m)    Wt 127 lb (57.6 kg)    SpO2 100%    BMI 20.5 kg/m2       Physical Exam   No Cervical Lymphadenopathy  No Supraclavicular Lymphadenopathy  Thyroid is Normal  Lungs are clear to ausculation and percussion  Heart:  S1 S2 are normal, No gallops, No mummers  No Carotid Bruits  Abdomen:  Normal Bowel Sounds. No tenderness. No masses. No Hepatomegaly or Splenomegly  LE:  Strong Pedal Pulses.   No Edema      BMI:  [facility] Outpatient Visit on 10/11/2017   Component Date Value Ref Range Status    Color 10/11/2017 DARK YELLOW    Final    Appearance 10/11/2017 CLOUDY    Final    Specific gravity 10/11/2017 1.017  1.005 - 1.030   Final    pH (UA) 10/11/2017 5.0  5.0 - 8.0   Final    Protein 10/11/2017 NEGATIVE   NEG mg/dL Final    Glucose 10/11/2017 NEGATIVE   NEG mg/dL Final    Ketone 10/11/2017 NEGATIVE   NEG mg/dL Final    Bilirubin 10/11/2017 NEGATIVE   NEG   Final    Blood 10/11/2017 TRACE* NEG   Final    Urobilinogen 10/11/2017 1.0  0.2 - 1.0 EU/dL Final    Nitrites 10/11/2017 POSITIVE* NEG   Final    Leukocyte Esterase 10/11/2017 MODERATE* NEG   Final    WBC 10/11/2017 11 to 20  0 - 4 /hpf Final    RBC 10/11/2017 0  0 - 5 /hpf Final    Epithelial cells 10/11/2017 1+  0 - 5 /lpf Final    Bacteria 10/11/2017 4+* NEG /hpf Final    Special Requests: 10/11/2017 NO SPECIAL REQUESTS    Final    Culture result: 10/11/2017 >100,000 COLONIES/mL ESCHERICHIA COLI*   Final   Clinical Support on 10/11/2017   Component Date Value Ref Range Status    Color (UA POC) 10/11/2017 Jerome   Final    Clarity (UA POC) 10/11/2017 Clear   Final    Glucose (UA POC) 10/11/2017 Negative  Negative Final    Bilirubin (UA POC) 10/11/2017 Negative  Negative Final    Ketones (UA POC) 10/11/2017 Negative  Negative Final    Specific gravity (UA POC) 10/11/2017 1.015  1.001 - 1.035 Final    Blood (UA POC) 10/11/2017 1+  Negative Final    pH (UA POC) 10/11/2017 5.0  4.6 - 8.0 Final    Protein (UA POC) 10/11/2017 Negative  Negative mg/dL Final    Urobilinogen (UA POC) 10/11/2017 0.2 mg/dL  0.2 - 1 Final    Nitrites (UA POC) 10/11/2017 Positive  Negative Final    Leukocyte esterase (UA POC) 10/11/2017 Trace  Negative Final   Hospital Outpatient Visit on 09/21/2017   Component Date Value Ref Range Status    Special Requests: 09/21/2017 NO SPECIAL REQUESTS    Final    Culture result: 09/21/2017 >100,000 COLONIES/mL ESCHERICHIA COLI*   Final    Color 09/21/2017 DARK YELLOW    Final    Appearance 09/21/2017 CLOUDY    Final    Specific gravity 09/21/2017 1.011  1.005 - 1.030   Final    pH (UA) 09/21/2017 6.0  5.0 - 8.0   Final    Protein 09/21/2017 NEGATIVE   NEG mg/dL Final    Glucose 09/21/2017 NEGATIVE   NEG mg/dL Final    Ketone 09/21/2017 NEGATIVE   NEG mg/dL Final    Bilirubin 09/21/2017 NEGATIVE   NEG   Final    Blood 09/21/2017 NEGATIVE   NEG   Final    Urobilinogen 09/21/2017 0.2  0.2 - 1.0 EU/dL Final    Nitrites 09/21/2017 POSITIVE* NEG   Final    Leukocyte Esterase 09/21/2017 MODERATE* NEG   Final    WBC 09/21/2017 36 to 50  0 - 4 /hpf Final    RBC 09/21/2017 NEGATIVE   0 - 5 /hpf Final    Epithelial cells 09/21/2017 FEW  0 - 5 /lpf Final    Bacteria 09/21/2017 3+* NEG /hpf Final   Office Visit on 09/21/2017   Component Date Value Ref Range Status    Color (UA POC) 09/21/2017 Yellow   Final    Clarity (UA POC) 09/21/2017 Clear   Final    Glucose (UA POC) 09/21/2017 Negative  Negative Final    Bilirubin (UA POC) 09/21/2017 Negative  Negative Final    Ketones (UA POC) 09/21/2017 Negative  Negative Final    Specific gravity (UA POC) 09/21/2017 1.010  1.001 - 1.035 Final    Blood (UA POC) 09/21/2017 Trace  Negative Final    pH (UA POC) 09/21/2017 5.5  4.6 - 8.0 Final    Protein (UA POC) 09/21/2017 Negative  Negative mg/dL Final    Urobilinogen (UA POC) 09/21/2017 0.2 mg/dL  0.2 - 1 Final    Nitrites (UA POC) 09/21/2017 Positive  Negative Final    Leukocyte esterase (UA POC) 09/21/2017 Trace  Negative Final   Hospital Outpatient Visit on 08/22/2017   Component Date Value Ref Range Status    Color 08/22/2017 YELLOW    Final    Appearance 08/22/2017 CLEAR    Final    Specific gravity 08/22/2017 1.017  1.005 - 1.030   Final    pH (UA) 08/22/2017 6.0  5.0 - 8.0   Final    Protein 08/22/2017 NEGATIVE   NEG mg/dL Final    Glucose 08/22/2017 NEGATIVE   NEG mg/dL Final    Ketone 08/22/2017 NEGATIVE   NEG mg/dL Final    Bilirubin 08/22/2017 NEGATIVE   NEG   Final    Blood 08/22/2017 NEGATIVE   NEG   Final    Urobilinogen 08/22/2017 0.2  0.2 - 1.0 EU/dL Final    Nitrites 08/22/2017 NEGATIVE   NEG   Final    Leukocyte Esterase 08/22/2017 SMALL* NEG   Final    WBC 08/22/2017 4 to 6  0 - 4 /hpf Final    RBC 08/22/2017 0  0 - 5 /hpf Final    Epithelial cells 08/22/2017 FEW  0 - 5 /lpf Final    Bacteria 08/22/2017 NEGATIVE   NEG /hpf Final    Special Requests: 08/22/2017 NO SPECIAL REQUESTS    Final    Culture result: 08/22/2017 NO GROWTH 2 DAYS    Final       .No results found for any visits on 10/17/17. Assessment / Plan      ICD-10-CM ICD-9-CM    1. Mitral valve disorder I05.9 394.9 CBC WITH AUTOMATED DIFF      METABOLIC PANEL, COMPREHENSIVE      URINALYSIS W/MICROSCOPIC      CULTURE, URINE   2. Recurrent UTI N39.0 599.0 CBC WITH AUTOMATED DIFF      METABOLIC PANEL, COMPREHENSIVE      URINALYSIS W/MICROSCOPIC      CULTURE, URINE   3. Menopause Z78.0 627.2 DEXA BONE DENSITY STUDY AXIAL       Labs ordered  she was advised to continue her maintenance medications  I discussed further plans for the recurrent UTIs and long term future with the bradycardia    Follow-up Disposition:  Return in about 5 months (around 3/17/2018). I asked Joan Notice if she has any questions and I answered the questions.   Joan Notice states that she understands the treatment plan and agrees with the treatment plan

## 2017-10-17 NOTE — MR AVS SNAPSHOT
Visit Information Date & Time Provider Department Dept. Phone Encounter #  
 10/17/2017  8:45 AM Penny Castorena MD [facility] OF [facility] [phone] [id number] Follow-up Instructions Return in about 5 months (around 3/17/2018). 11/21/2017  9:15 AM  
Any with Robert Massey MD  
[facility] Appt Note: NP dx: rec UTI self referred Kiana Raines [address] [place] [id number]  
[address] Pat[address]es [address] [place] [address] Upcoming Health Maintenance Date Due  
 GLAUCOMA SCREENING Q2Y 10/27/2002 INFLUENZA AGE 9 TO ADULT 8/1/2017 MEDICARE YEARLY EXAM 4/26/2018 DTaP/Tdap/Td series (2 - Td) 8/25/2027 Allergies as of 10/17/2017  Review Complete On: 10/17/2017 By: Penny Castorena MD  
  
 Severity Noted Reaction Type Reactions Latex High 07/22/2014    Anaphylaxis Ampicillin Medium  Side Effect Hives Codeine Medium  Side Effect Nausea and Vomiting Doxycycline Medium 06/18/2013   Side Effect Hives States can take with benadryl Macrodantin [Nitrofurantoin Macrocrystalline] Medium  Side Effect Hives Other Medication Medium  Side Effect Hives Thallium dyes Pcn [Penicillins] Medium  Side Effect Hives Other reaction(s): mild rash/itching Sulfa (Sulfonamide Antibiotics) Medium  Side Effect Hives Other reaction(s): mild rash/itching Tetracycline Medium 06/18/2013   Side Effect Hives Scopolamine    Other (comments) Other reaction(s): cardiac dysrhythmia Patient reports A-Fib arrythmia Thallium-201  07/22/2014    Rash Current Immunizations  Reviewed on 7/22/2014 Name Date Influenza High Dose Vaccine PF 10/25/2016 11:08 AM  
 Pneumococcal Conjugate (PCV-13) 12/2/2014 Pneumococcal Vaccine (Unspecified Type) 10/20/2004 Zoster Vaccine, Live 3/4/2008 Not reviewed this visit You Were Diagnosed With   
  
 Codes Comments Mitral valve disorder    -  Primary ICD-10-CM: I05.9 ICD-9-CM: 394.9 Recurrent UTI     ICD-10-CM: N39.0 ICD-9-CM: 599.0 Menopause     ICD-10-CM: Z78.0 ICD-9-CM: 627.2 Vitals BP Pulse Temp Resp Height(growth percentile) 112/68 (BP 1 Location: Left arm, BP Patient Position: Sitting) 60 97.3 °F (36.3 °C) (Tympanic) 16 5' 6\" (1.676 m) Weight(growth percentile) SpO2 BMI OB Status Smoking Status 127 lb (57.6 kg) 100% 20.5 kg/m2 Hysterectomy Never Smoker Vitals History BMI and BSA Data Body Mass Index Body Surface Area 20.5 kg/m 2 1.64 m 2 Preferred Pharmacy Pharmacy Name Phone [id number] [facility] [phone] Your Updated Medication List  
  
   
This list is accurate as of: 10/17/17  9:59 AM.  Always use your most recent med list.  
  
  
  
  
 ascorbic acid (vitamin C) 1,000 mg tablet Commonly known as:  VITAMIN C Take  by mouth. aspirin 81 mg tablet Take 81 mg by mouth. B12 SL  
by SubLINGual route two (2) times a day. calcium 600 mg Cap Take  by mouth two (2) times a day. ciprofloxacin HCl 500 mg tablet Commonly known as:  CIPRO Take 1 Tab by mouth two (2) times a day for 7 days. CRANBERRY CONCENTRATE PO Take  by mouth two (2) times a day. ergocalciferol 50,000 unit capsule Commonly known as:  VITAMIN D2 Take one cap by mouth twice a month  
  
 estradiol 0.01 % (0.1 mg/gram) vaginal cream  
Commonly known as:  ESTRACE Apply small amount to urethral  Opening daily FISH OIL 1,000 mg Cap Generic drug:  omega-3 fatty acids-vitamin e Take 1 Cap by mouth two (2) times a day. folic acid 945 mcg tablet Take 400 mcg by mouth daily. glucosamine-chondroitin 2,000-1,200 mg/30 mL Liqd Take  by mouth.  
  
 levocetirizine 5 mg tablet Commonly known as:  Dayana Hemp Take 1 Tab by mouth daily. magnesium oxide 400 mg tablet Commonly known as:  MAG-OX Take 400 mg by mouth daily. OTHER  
2 Tabs two (2) times a day. Macular Protect Complete vitamin Follow-up Instructions Return in about 5 months (around 3/17/2018). To-Do List   
 10/17/2017 Microbiology:  CULTURE, URINE   
  
 10/17/2017 Lab:  METABOLIC PANEL, COMPREHENSIVE   
  
 10/17/2017 Lab:  URINALYSIS W/MICROSCOPIC   
  
 10/18/2017 Imaging:  DEXA BONE DENSITY STUDY AXIAL   
  
 02/16/2018 Lab:  CBC WITH AUTOMATED DIFF Patient Instructions Health Maintenance Due Topic  GLAUCOMA SCREENING Q2Y   
 INFLUENZA AGE 9 TO ADULT Introducing [facility]! Cammie Echeverria introduces [employer] patient portal. Now you can access parts of your medical record, email your doctor's office, and request medication refills online. 1. In your internet browser, go to [email or web address] 2. Click on the First Time User? Click Here link in the Sign In box. You will see the New Member Sign Up page. 3. Enter your [employer] Access Code exactly as it appears below. You will not need to use this code after youve completed the sign-up process. If you do not sign up before the expiration date, you must request a new code. · [employer] Access Code: [email or web address] Expires: 1/15/2018  9:59 AM 
 
4. Enter the last four digits of your Social Security Number (xxxx) and Date of Birth (mm/dd/yyyy) as indicated and click Submit. You will be taken to the next sign-up page. 5. Create a [employer] ID. This will be your [employer] login ID and cannot be changed, so think of one that is secure and easy to remember. 6. Create a [employer] password. You can change your password at any time. 7. Enter your Password Reset Question and Answer. This can be used at a later time if you forget your password. 8. Enter your e-mail address.  You will receive e-mail notification when new information is available in [employer]. 9. Click Sign Up. You can now view and download portions of your medical record. 10. Click the Download Summary menu link to download a portable copy of your medical information. If you have questions, please visit the Frequently Asked Questions section of the [employer] website. Remember, [employer] is NOT to be used for urgent needs. For medical emergencies, dial 911. Now available from your iPhone and Android! Please provide this summary of care documentation to your next provider. Your primary care clinician is listed as [facility] [employer]. If you have any questions after today's visit, please call [phone].

## 2017-10-17 NOTE — ACP (ADVANCE CARE PLANNING)
The patient has made an advanced directive. she was advised to bring a copy into the office for us to scan into the system. Patients daughter knows patients wishes.

## 2017-10-23 ENCOUNTER — HOSPITAL ENCOUNTER (OUTPATIENT)
Dept: LAB | Age: 82
Discharge: HOME | End: 2017-10-23
Payer: MEDICARE

## 2017-10-23 DIAGNOSIS — I05.9 MITRAL VALVE DISORDER: ICD-10-CM

## 2017-10-23 DIAGNOSIS — N39.0 RECURRENT UTI: ICD-10-CM

## 2017-10-23 LAB
APPEARANCE UR: CLEAR
BACTERIA URNS QL MICRO: ABNORMAL /HPF
BILIRUB UR QL: NEGATIVE
COLOR UR: YELLOW
EPITH CASTS URNS QL MICRO: ABNORMAL /LPF (ref 0–5)
GLUCOSE UR STRIP.AUTO-MCNC: NEGATIVE MG/DL
HGB UR QL STRIP: NEGATIVE
KETONES UR QL STRIP.AUTO: NEGATIVE MG/DL
LEUKOCYTE ESTERASE UR QL STRIP.AUTO: ABNORMAL
NITRITE UR QL STRIP.AUTO: POSITIVE
PH UR STRIP: 5 [PH] (ref 5–8)
PROT UR STRIP-MCNC: NEGATIVE MG/DL
RBC #/AREA URNS HPF: 0 /HPF (ref 0–5)
SP GR UR REFRACTOMETRY: 1.02 (ref 1–1.03)
UROBILINOGEN UR QL STRIP.AUTO: 0.2 EU/DL (ref 0.2–1)
WBC URNS QL MICRO: ABNORMAL /HPF (ref 0–4)

## 2017-10-23 PROCEDURE — 81001 URINALYSIS AUTO W/SCOPE: CPT | Performed by: INTERNAL MEDICINE

## 2017-10-23 PROCEDURE — 87077 CULTURE AEROBIC IDENTIFY: CPT | Performed by: INTERNAL MEDICINE

## 2017-10-23 PROCEDURE — 87186 SC STD MICRODIL/AGAR DIL: CPT | Performed by: INTERNAL MEDICINE

## 2017-10-23 PROCEDURE — 87086 URINE CULTURE/COLONY COUNT: CPT | Performed by: INTERNAL MEDICINE

## 2017-10-24 RX ORDER — CIPROFLOXACIN 500 MG/1
500 TABLET ORAL 2 TIMES DAILY
Qty: 20 TAB | Refills: 0 | Status: SHIPPED | OUTPATIENT
Start: 2017-10-24 | End: 2017-11-03

## 2017-10-26 LAB
BACTERIA SPEC CULT: ABNORMAL
SERVICE CMNT-IMP: ABNORMAL

## 2017-10-27 RX ORDER — NITROFURANTOIN 25; 75 MG/1; MG/1
100 CAPSULE ORAL 2 TIMES DAILY
Qty: 14 CAP | Refills: 0 | OUTPATIENT
Start: 2017-10-27 | End: 2017-11-03

## 2017-11-06 ENCOUNTER — LAB ONLY (OUTPATIENT)
Dept: INTERNAL MEDICINE CLINIC | Age: 82
End: 2017-11-06

## 2017-11-06 ENCOUNTER — CLINICAL SUPPORT (OUTPATIENT)
Dept: INTERNAL MEDICINE CLINIC | Age: 82
End: 2017-11-06

## 2017-11-06 DIAGNOSIS — Z23 ENCOUNTER FOR IMMUNIZATION: Primary | ICD-10-CM

## 2017-11-06 DIAGNOSIS — R30.0 DYSURIA: Primary | ICD-10-CM

## 2017-11-06 NOTE — PROGRESS NOTES
Patient presented to office for flu shot. Allergies noted. Patient well and consenting to injection. Vaccine given intramuscular in left deltoid. Patient tolerated injection well and left office ambulatory.

## 2018-02-27 ENCOUNTER — HOSPITAL ENCOUNTER (OUTPATIENT)
Dept: LAB | Age: 83
Discharge: HOME | End: 2018-02-27
Payer: MEDICARE

## 2018-02-27 ENCOUNTER — OFFICE VISIT (OUTPATIENT)
Dept: INTERNAL MEDICINE CLINIC | Age: 83
End: 2018-02-27

## 2018-02-27 VITALS
SYSTOLIC BLOOD PRESSURE: 130 MMHG | HEIGHT: 66 IN | RESPIRATION RATE: 16 BRPM | WEIGHT: 128 LBS | OXYGEN SATURATION: 94 % | TEMPERATURE: 98.8 F | BODY MASS INDEX: 20.57 KG/M2 | DIASTOLIC BLOOD PRESSURE: 72 MMHG | HEART RATE: 87 BPM

## 2018-02-27 DIAGNOSIS — N30.01 ACUTE CYSTITIS WITH HEMATURIA: Primary | ICD-10-CM

## 2018-02-27 DIAGNOSIS — N30.01 ACUTE CYSTITIS WITH HEMATURIA: ICD-10-CM

## 2018-02-27 LAB
BILIRUB UR QL STRIP: NEGATIVE
GLUCOSE UR-MCNC: NEGATIVE MG/DL
KETONES P FAST UR STRIP-MCNC: NEGATIVE MG/DL
PH UR STRIP: 7 [PH] (ref 4.6–8)
PROT UR QL STRIP: NEGATIVE
SP GR UR STRIP: 1.01 (ref 1–1.03)
UA UROBILINOGEN AMB POC: NORMAL (ref 0.2–1)
URINALYSIS CLARITY POC: NORMAL
URINALYSIS COLOR POC: YELLOW
URINE BLOOD POC: NORMAL
URINE LEUKOCYTES POC: NORMAL
URINE NITRITES POC: POSITIVE

## 2018-02-27 PROCEDURE — 87077 CULTURE AEROBIC IDENTIFY: CPT | Performed by: NURSE PRACTITIONER

## 2018-02-27 PROCEDURE — 87186 SC STD MICRODIL/AGAR DIL: CPT | Performed by: NURSE PRACTITIONER

## 2018-02-27 PROCEDURE — 87086 URINE CULTURE/COLONY COUNT: CPT | Performed by: NURSE PRACTITIONER

## 2018-02-27 RX ORDER — NITROFURANTOIN 25; 75 MG/1; MG/1
100 CAPSULE ORAL 2 TIMES DAILY
Qty: 14 CAP | Refills: 0 | Status: SHIPPED | OUTPATIENT
Start: 2018-02-27 | End: 2018-03-30 | Stop reason: SDUPTHER

## 2018-02-27 NOTE — PROGRESS NOTES
Etta Sandhoff is a [de-identified] y.o. female presenting today for Urinary Frequency  . HPI:  Etta Sandhoff presents to the office today for urinary frequency x 1 weeks. Patient was given a prescription from her Urologist to take when she has UTI's. Patient took 1 week of Trimethoprim 100 mg BID but notes her symptoms worsened. She reports she has had urinary incontinence. Review of Systems   Respiratory: Negative for cough. Cardiovascular: Negative for chest pain and palpitations. Genitourinary: Positive for frequency and urgency. Negative for dysuria. Allergies   Allergen Reactions    Latex Anaphylaxis    Ampicillin Hives    Codeine Nausea and Vomiting    Doxycycline Hives     States can take with benadryl    Macrodantin [Nitrofurantoin Macrocrystalline] Hives    Other Medication Hives     Thallium dyes    Pcn [Penicillins] Hives     Other reaction(s): mild rash/itching    Sulfa (Sulfonamide Antibiotics) Hives     Other reaction(s): mild rash/itching    Tetracycline Hives    Scopolamine Other (comments)     Other reaction(s): cardiac dysrhythmia  Patient reports A-Fib arrythmia    Thallium-201 Rash       Current Outpatient Prescriptions   Medication Sig Dispense Refill    nitrofurantoin, macrocrystal-monohydrate, (MACROBID) 100 mg capsule Take 1 Cap by mouth two (2) times a day. 14 Cap 0    trimethoprim (TRIMPEX) 100 mg tablet Take 1 Tab by mouth two (2) times a day. 20 Tab 3    levocetirizine (XYZAL) 5 mg tablet Take 1 Tab by mouth daily. 30 Tab 5    ascorbic acid (VITAMIN C) 1,000 mg tablet Take  by mouth.  ergocalciferol (VITAMIN D2) 50,000 unit capsule Take one cap by mouth twice a month 6 Cap 5    estradiol (ESTRACE) 0.01 % (0.1 mg/gram) vaginal cream Apply small amount to urethral  Opening daily 85 g 3    OTHER 2 Tabs two (2) times a day. Macular Protect Complete vitamin      omega-3 fatty acids-vitamin e (FISH OIL) 1,000 mg Cap Take 1 Cap by mouth two (2) times a day.       calcium 600 mg Cap Take  by mouth two (2) times a day.  GLUCOSAMINE HCL/CHONDRO RAMIREZ A (GLUCOSAMINE-CHONDROITIN) 2,000-1,200 mg/30 mL Liqd Take  by mouth.  CYANOCOBALAMIN/COBAMAMIDE (B12 SL) by SubLINGual route two (2) times a day.  aspirin 81 mg tablet Take 81 mg by mouth.  magnesium oxide (MAG-OX) 400 mg tablet Take 400 mg by mouth daily.  folic acid 801 mcg tablet Take 400 mcg by mouth daily.  ASCORBIC ACID/VITAMIN E (CRANBERRY CONCENTRATE PO) Take  by mouth two (2) times a day.  clindamycin (CLEOCIN) 300 mg capsule          Past Medical History:   Diagnosis Date    Abnormal weight gain     Feels cold     Insect bite of hip, infected     Mitral valve disorder     MVP (mitral valve prolapse)     Nausea & vomiting        Past Surgical History:   Procedure Laterality Date    ENDOSCOPY, COLON, DIAGNOSTIC  03/23/07    mild internal hemorrhoids    HX BREAST LUMPECTOMY      left    HX CATARACT REMOVAL      bilaterally with lens implant    HX HYSTERECTOMY      HX OOPHORECTOMY      HX OTHER SURGICAL      bladder surgery - sling made with muscle from leg    TOTAL KNEE ARTHROPLASTY      right       Social History     Social History    Marital status:      Spouse name: N/A    Number of children: N/A    Years of education: N/A     Occupational History    Not on file. Social History Main Topics    Smoking status: Never Smoker    Smokeless tobacco: Never Used    Alcohol use No    Drug use: No    Sexual activity: No     Other Topics Concern    Not on file     Social History Narrative       Patient does not have an advanced directive on file    Vitals:    02/27/18 1418   BP: 130/72   Pulse: 87   Resp: 16   Temp: 98.8 °F (37.1 °C)   TempSrc: Tympanic   SpO2: 94%   Weight: 128 lb (58.1 kg)   Height: 5' 6\" (1.676 m)   PainSc:   0 - No pain       Physical Exam   Constitutional: No distress. Cardiovascular: Normal rate, regular rhythm and normal heart sounds. Pulmonary/Chest: Effort normal and breath sounds normal.   Abdominal: Soft. There is no tenderness. Nursing note and vitals reviewed. Office Visit on 02/27/2018   Component Date Value Ref Range Status    Color (UA POC) 02/27/2018 Yellow   Final    Clarity (UA POC) 02/27/2018 Cloudy   Final    Glucose (UA POC) 02/27/2018 Negative  Negative Final    Bilirubin (UA POC) 02/27/2018 Negative  Negative Final    Ketones (UA POC) 02/27/2018 Negative  Negative Final    Specific gravity (UA POC) 02/27/2018 1.015  1.001 - 1.035 Final    Blood (UA POC) 02/27/2018 2+  Negative Final    pH (UA POC) 02/27/2018 7.0  4.6 - 8.0 Final    Protein (UA POC) 02/27/2018 Negative  Negative Final    Urobilinogen (UA POC) 02/27/2018 0.2 mg/dL  0.2 - 1 Final    Nitrites (UA POC) 02/27/2018 Positive  Negative Final    Leukocyte esterase (UA POC) 02/27/2018 3+  Negative Final   Office Visit on 01/16/2018   Component Date Value Ref Range Status    Color (UA POC) 01/16/2018 Yellow   Final    Clarity (UA POC) 01/16/2018 Clear   Final    Glucose (UA POC) 01/16/2018 Negative  Negative Final    Bilirubin (UA POC) 01/16/2018 Negative  Negative Final    Ketones (UA POC) 01/16/2018 Negative  Negative Final    Specific gravity (UA POC) 01/16/2018 1.015  1.001 - 1.035 Final    Blood (UA POC) 01/16/2018 Negative  Negative Final    pH (UA POC) 01/16/2018 7.0  4.6 - 8.0 Final    Protein (UA POC) 01/16/2018 Negative  Negative Final    Urobilinogen (UA POC) 01/16/2018 0.2 mg/dL  0.2 - 1 Final    Nitrites (UA POC) 01/16/2018 Negative  Negative Final    Leukocyte esterase (UA POC) 01/16/2018 Negative  Negative Final    PVR 01/16/2018 260  cc Corrected       .   Results for orders placed or performed in visit on 02/27/18   AMB POC URINALYSIS DIP STICK AUTO W/O MICRO   Result Value Ref Range    Color (UA POC) Yellow     Clarity (UA POC) Cloudy     Glucose (UA POC) Negative Negative    Bilirubin (UA POC) Negative Negative    Ketones (UA POC) Negative Negative    Specific gravity (UA POC) 1.015 1.001 - 1.035    Blood (UA POC) 2+ Negative    pH (UA POC) 7.0 4.6 - 8.0    Protein (UA POC) Negative Negative    Urobilinogen (UA POC) 0.2 mg/dL 0.2 - 1    Nitrites (UA POC) Positive Negative    Leukocyte esterase (UA POC) 3+ Negative       Assessment / Plan:      ICD-10-CM ICD-9-CM    1. Acute cystitis with hematuria N30.01 595.0 AMB POC URINALYSIS DIP STICK AUTO W/O MICRO      CULTURE, URINE      nitrofurantoin, macrocrystal-monohydrate, (MACROBID) 100 mg capsule       POC UA- + UTI  Macrobid rx  Urine Culture  F/u in 2 weeks, test of cure    Follow-up Disposition:  Return in about 2 weeks (around 3/13/2018), or if symptoms worsen or fail to improve. I asked the patient if she  had any questions and answered her  questions.   The patient stated that she understands the treatment plan and agrees with the treatment plan

## 2018-03-02 LAB
BACTERIA SPEC CULT: ABNORMAL
SERVICE CMNT-IMP: ABNORMAL

## 2018-03-27 ENCOUNTER — HOSPITAL ENCOUNTER (OUTPATIENT)
Dept: LAB | Age: 83
Discharge: HOME | End: 2018-03-27
Payer: MEDICARE

## 2018-03-27 ENCOUNTER — LAB ONLY (OUTPATIENT)
Dept: INTERNAL MEDICINE CLINIC | Age: 83
End: 2018-03-27

## 2018-03-27 DIAGNOSIS — N30.01 ACUTE CYSTITIS WITH HEMATURIA: ICD-10-CM

## 2018-03-27 DIAGNOSIS — Z13.220 SCREENING FOR LIPID DISORDERS: ICD-10-CM

## 2018-03-27 DIAGNOSIS — I05.9 MITRAL VALVE DISORDER: ICD-10-CM

## 2018-03-27 DIAGNOSIS — Z13.220 SCREENING FOR LIPID DISORDERS: Primary | ICD-10-CM

## 2018-03-27 LAB
ALBUMIN SERPL-MCNC: 3.9 G/DL (ref 3.4–5)
ALBUMIN/GLOB SERPL: 1.2 {RATIO} (ref 0.8–1.7)
ALP SERPL-CCNC: 66 U/L (ref 45–117)
ALT SERPL-CCNC: 27 U/L (ref 13–56)
ANION GAP SERPL CALC-SCNC: 6 MMOL/L (ref 3–18)
APPEARANCE UR: CLEAR
AST SERPL-CCNC: 30 U/L (ref 15–37)
BACTERIA URNS QL MICRO: NEGATIVE /HPF
BASOPHILS # BLD: 0 K/UL (ref 0–0.06)
BASOPHILS NFR BLD: 0 % (ref 0–2)
BILIRUB SERPL-MCNC: 0.4 MG/DL (ref 0.2–1)
BILIRUB UR QL STRIP: NEGATIVE
BILIRUB UR QL: NEGATIVE
BUN SERPL-MCNC: 26 MG/DL (ref 7–18)
BUN/CREAT SERPL: 25 (ref 12–20)
CALCIUM SERPL-MCNC: 9.2 MG/DL (ref 8.5–10.1)
CHLORIDE SERPL-SCNC: 102 MMOL/L (ref 100–108)
CHOLEST SERPL-MCNC: 172 MG/DL
CO2 SERPL-SCNC: 32 MMOL/L (ref 21–32)
COLOR UR: YELLOW
CREAT SERPL-MCNC: 1.02 MG/DL (ref 0.6–1.3)
DIFFERENTIAL METHOD BLD: ABNORMAL
EOSINOPHIL # BLD: 0.1 K/UL (ref 0–0.4)
EOSINOPHIL NFR BLD: 2 % (ref 0–5)
EPITH CASTS URNS QL MICRO: ABNORMAL /LPF (ref 0–5)
ERYTHROCYTE [DISTWIDTH] IN BLOOD BY AUTOMATED COUNT: 13.9 % (ref 11.6–14.5)
GLOBULIN SER CALC-MCNC: 3.3 G/DL (ref 2–4)
GLUCOSE SERPL-MCNC: 77 MG/DL (ref 74–99)
GLUCOSE UR STRIP.AUTO-MCNC: NEGATIVE MG/DL
GLUCOSE UR-MCNC: NEGATIVE MG/DL
HCT VFR BLD AUTO: 42.1 % (ref 35–45)
HGB BLD-MCNC: 13.4 G/DL (ref 12–16)
HGB UR QL STRIP: NEGATIVE
KETONES P FAST UR STRIP-MCNC: NEGATIVE MG/DL
KETONES UR QL STRIP.AUTO: NEGATIVE MG/DL
LEUKOCYTE ESTERASE UR QL STRIP.AUTO: ABNORMAL
LYMPHOCYTES # BLD: 1.1 K/UL (ref 0.9–3.6)
LYMPHOCYTES NFR BLD: 27 % (ref 21–52)
MCH RBC QN AUTO: 31.5 PG (ref 24–34)
MCHC RBC AUTO-ENTMCNC: 31.8 G/DL (ref 31–37)
MCV RBC AUTO: 98.8 FL (ref 74–97)
MONOCYTES # BLD: 0.4 K/UL (ref 0.05–1.2)
MONOCYTES NFR BLD: 11 % (ref 3–10)
NEUTS SEG # BLD: 2.5 K/UL (ref 1.8–8)
NEUTS SEG NFR BLD: 60 % (ref 40–73)
NITRITE UR QL STRIP.AUTO: NEGATIVE
PH UR STRIP: 7 [PH] (ref 4.6–8)
PH UR STRIP: 7.5 [PH] (ref 5–8)
PLATELET # BLD AUTO: 204 K/UL (ref 135–420)
PMV BLD AUTO: 11.5 FL (ref 9.2–11.8)
POTASSIUM SERPL-SCNC: 4.4 MMOL/L (ref 3.5–5.5)
PROT SERPL-MCNC: 7.2 G/DL (ref 6.4–8.2)
PROT UR QL STRIP: NEGATIVE
PROT UR STRIP-MCNC: NEGATIVE MG/DL
RBC # BLD AUTO: 4.26 M/UL (ref 4.2–5.3)
RBC #/AREA URNS HPF: 0 /HPF (ref 0–5)
SODIUM SERPL-SCNC: 140 MMOL/L (ref 136–145)
SP GR UR REFRACTOMETRY: 1.02 (ref 1–1.03)
SP GR UR STRIP: 1.01 (ref 1–1.03)
UA UROBILINOGEN AMB POC: NORMAL (ref 0.2–1)
URINALYSIS CLARITY POC: CLEAR
URINALYSIS COLOR POC: YELLOW
URINE BLOOD POC: NORMAL
URINE LEUKOCYTES POC: NEGATIVE
URINE NITRITES POC: NEGATIVE
UROBILINOGEN UR QL STRIP.AUTO: 0.2 EU/DL (ref 0.2–1)
WBC # BLD AUTO: 4.1 K/UL (ref 4.6–13.2)
WBC URNS QL MICRO: ABNORMAL /HPF (ref 0–4)

## 2018-03-27 PROCEDURE — 87186 SC STD MICRODIL/AGAR DIL: CPT | Performed by: INTERNAL MEDICINE

## 2018-03-27 PROCEDURE — 82465 ASSAY BLD/SERUM CHOLESTEROL: CPT | Performed by: INTERNAL MEDICINE

## 2018-03-27 PROCEDURE — 80053 COMPREHEN METABOLIC PANEL: CPT | Performed by: INTERNAL MEDICINE

## 2018-03-27 PROCEDURE — 81001 URINALYSIS AUTO W/SCOPE: CPT | Performed by: INTERNAL MEDICINE

## 2018-03-27 PROCEDURE — 85025 COMPLETE CBC W/AUTO DIFF WBC: CPT | Performed by: INTERNAL MEDICINE

## 2018-03-27 PROCEDURE — 87086 URINE CULTURE/COLONY COUNT: CPT | Performed by: INTERNAL MEDICINE

## 2018-03-27 PROCEDURE — 87077 CULTURE AEROBIC IDENTIFY: CPT | Performed by: INTERNAL MEDICINE

## 2018-03-30 ENCOUNTER — TELEPHONE (OUTPATIENT)
Dept: INTERNAL MEDICINE CLINIC | Age: 83
End: 2018-03-30

## 2018-03-30 DIAGNOSIS — N30.01 ACUTE CYSTITIS WITH HEMATURIA: ICD-10-CM

## 2018-03-30 LAB
BACTERIA SPEC CULT: ABNORMAL
SERVICE CMNT-IMP: ABNORMAL

## 2018-03-30 RX ORDER — NITROFURANTOIN 25; 75 MG/1; MG/1
100 CAPSULE ORAL 2 TIMES DAILY
Qty: 14 CAP | Refills: 0 | Status: SHIPPED | OUTPATIENT
Start: 2018-03-30 | End: 2018-04-19 | Stop reason: ALTCHOICE

## 2018-03-30 NOTE — TELEPHONE ENCOUNTER
Patient called stating she is having all the symptoms of a UTI and she is leaving t go to [place] tomorrow she is asking that Dr Ellouise Claude send Liliane Blaze to the pharmacy today please call her with any questions or to let her know it is done [phone]

## 2018-04-17 ENCOUNTER — OFFICE VISIT (OUTPATIENT)
Dept: INTERNAL MEDICINE CLINIC | Age: 83
End: 2018-04-17

## 2018-04-17 VITALS
SYSTOLIC BLOOD PRESSURE: 126 MMHG | RESPIRATION RATE: 16 BRPM | TEMPERATURE: 98 F | OXYGEN SATURATION: 98 % | HEART RATE: 65 BPM | DIASTOLIC BLOOD PRESSURE: 76 MMHG | WEIGHT: 135 LBS | HEIGHT: 66 IN | BODY MASS INDEX: 21.69 KG/M2

## 2018-04-17 DIAGNOSIS — N20.0 KIDNEY STONES: Primary | ICD-10-CM

## 2018-04-17 DIAGNOSIS — Z87.440 HISTORY OF RECURRENT UTIS: ICD-10-CM

## 2018-04-17 NOTE — MR AVS SNAPSHOT
[facility] 
 
 
 [address] [facility] [address] 
[phone] Patient: Sonal Santiago MRN: [id number] :1935 Visit Information Date & Time Provider Department Dept. Phone Encounter #  
 2018  9:15 AM Tita Castellon MD [facility] INTERNAL MEDICINE OF [address] [place] [address] Drive [phone] Your Appointments 2018  9:30 AM  
Follow Up with Tita Castellon MD  
[address] [facility]) Appt Note: 5 month [address] [facility] [address]  
  
   
 [address] [facility] [address]  
  
    
  
 2019 10:45 AM  
Any with Magdiel Jordan MD  
[facility] ([facility]) Appt Note: 1 yr fu  
 [address] [facility] [place] [facility] [address] [facility]  
  
   
 [address] [facility] [address] Upcoming Health Maintenance Date Due  
 GLAUCOMA SCREENING Q2Y 10/27/2002 MEDICARE YEARLY EXAM 2018 DTaP/Tdap/Td series (2 - Td) 2027 Allergies as of 2018  Review Complete On: 2018 By: Tita Castellon MD  
  
 Severity Noted Reaction Type Reactions Latex High 2014    Anaphylaxis Ampicillin Medium  Side Effect Hives Codeine Medium  Side Effect Nausea and Vomiting Doxycycline Medium 2013   Side Effect Hives States can take with benadryl Macrodantin [Nitrofurantoin Macrocrystalline] Medium  Side Effect Hives Other Medication Medium  Side Effect Hives Thallium dyes Pcn [Penicillins] Medium  Side Effect Hives Other reaction(s): mild rash/itching Sulfa (Sulfonamide Antibiotics) Medium  Side Effect Hives Other reaction(s): mild rash/itching Tetracycline Medium 2013   Side Effect Hives Scopolamine    Other (comments) Other reaction(s): cardiac dysrhythmia Patient reports A-Fib arrythmia Thallium-201  07/22/2014    Rash Current Immunizations  Reviewed on 7/22/2014 Name Date Influenza High Dose Vaccine PF 11/6/2017, 10/25/2016 11:08 AM  
 Pneumococcal Conjugate (PCV-13) 12/2/2014 Pneumococcal Polysaccharide (PPSV-23) 2/7/2006 12:00 AM  
 Pneumococcal Vaccine (Unspecified Type) 10/20/2004 Zoster Vaccine, Live 3/4/2008 Not reviewed this visit You Were Diagnosed With   
  
 Codes Comments Kidney stones    -  Primary ICD-10-CM: N20.0 ICD-9-CM: 592.0 History of recurrent UTIs     ICD-10-CM: Z87.440 ICD-9-CM: V13.02 Vitals BP Pulse Temp Resp Height(growth percentile) Weight(growth percentile) 126/76 (BP 1 Location: Left arm, BP Patient Position: Sitting) 65 98 °F (36.7 °C) (Tympanic) 16 5' 6\" (1.676 m) 135 lb (61.2 kg) SpO2 BMI OB Status Smoking Status 98% 21.79 kg/m2 Hysterectomy Never Smoker BMI and BSA Data Body Mass Index Body Surface Area 21.79 kg/m 2 1.69 m 2 Preferred Pharmacy Pharmacy Name Phone [address], Arabella Copeland [address] [place] [address] [phone] [facility] [phone] Your Updated Medication List  
  
   
This list is accurate as of 4/17/18 10:14 AM.  Always use your most recent med list.  
  
  
  
  
 ascorbic acid (vitamin C) 1,000 mg tablet Commonly known as:  VITAMIN C Take  by mouth. aspirin 81 mg tablet Take 81 mg by mouth. B12 SL  
by SubLINGual route two (2) times a day. calcium 600 mg Cap Take  by mouth two (2) times a day. clindamycin 300 mg capsule Commonly known as:  CLEOCIN  
  
 CRANBERRY CONCENTRATE PO Take  by mouth two (2) times a day. ergocalciferol 50,000 unit capsule Commonly known as:  VITAMIN D2 Take one cap by mouth twice a month  
  
 estradiol 0.01 % (0.1 mg/gram) vaginal cream  
Commonly known as:  ESTRACE Apply small amount to urethral  Opening daily FISH OIL 1,000 mg Cap Generic drug:  omega-3 fatty acids-vitamin e Take 1 Cap by mouth two (2) times a day. folic acid 774 mcg tablet Take 400 mcg by mouth daily. glucosamine-chondroitin 2,000-1,200 mg/30 mL Liqd Take  by mouth.  
  
 levocetirizine 5 mg tablet Commonly known as:  Margy Nikita Take 1 Tab by mouth daily. magnesium oxide 400 mg tablet Commonly known as:  MAG-OX Take 400 mg by mouth daily. nitrofurantoin (macrocrystal-monohydrate) 100 mg capsule Commonly known as:  MACROBID Take 1 Cap by mouth two (2) times a day. OTHER  
2 Tabs two (2) times a day. Macular Protect Complete vitamin  
  
 trimethoprim 100 mg tablet Commonly known as:  TRIMPEX Take 1 Tab by mouth two (2) times a day. To-Do List   
 04/17/2018 Imaging:  CT ABD PELV WO CONT   
  
 04/24/2018 9:00 AM  
  Appointment with [facility] CT RM 1 at [facility] RAD CT ([phone]) ORAL CONTRAST/PREP  No oral contrast is needed for this exam.  DIET RESTRICTIONS  Nothing to eat or drink, 4 hours prior to study  May have water to take meds  GENERAL INSTRUCTIONS  If you were given premedications for IV contrast to take prior to having your study, please arrange to have someone drive you to your appointment. If you have had a creatinine level drawn within the past 30 days, please bring most recent results to your appt. MEDICATIONS  Bring a complete list of all medications you are currently taking to include prescriptions, over-the-counter meds, herbals, vitamins & any dietary supplements. RELATED STUDY INFORMATION  Bring any films, CDs, and reports related with you on the day of your exam.  This only includes studies done outside of [address] [place] [address], [address], and [facility]. QUESTIONS  Notify the CT Department if you have any questions concerning your study. Cynthia - [phone] Nadiya Posadas [facility] - [phone] Introducing [facility]! [facility] introduces [employer] patient portal. Now you can access parts of your medical record, email your doctor's office, and request medication refills online. 1. In your internet browser, go to [email or web address] 2. Click on the First Time User? Click Here link in the Sign In box. You will see the New Member Sign Up page. 3. Enter your [employer] Access Code exactly as it appears below. You will not need to use this code after youve completed the sign-up process. If you do not sign up before the expiration date, you must request a new code. · [employer] Access Code: [email or web address] Expires: 7/16/2018 10:14 AM 
 
4. Enter the last four digits of your Social Security Number (xxxx) and Date of Birth (mm/dd/yyyy) as indicated and click Submit. You will be taken to the next sign-up page. 5. Create a [employer] ID. This will be your [employer] login ID and cannot be changed, so think of one that is secure and easy to remember. 6. Create a [employer] password. You can change your password at any time. 7. Enter your Password Reset Question and Answer. This can be used at a later time if you forget your password. 8. Enter your e-mail address. You will receive e-mail notification when new information is available in [address] 9. Click Sign Up. You can now view and download portions of your medical record. 10. Click the Download Summary menu link to download a portable copy of your medical information. If you have questions, please visit the Frequently Asked Questions section of the [employer] website. Remember, [employer] is NOT to be used for urgent needs. For medical emergencies, dial 911. Now available from your iPhone and Android! Please provide this summary of care documentation to your next provider. Your primary care clinician is listed as Fili Matthews. If you have any questions after today's visit, please call [phone].

## 2018-04-23 ENCOUNTER — TELEPHONE (OUTPATIENT)
Dept: INTERNAL MEDICINE CLINIC | Age: 83
End: 2018-04-23

## 2018-04-23 DIAGNOSIS — R32 INCONTINENCE OF URINE IN FEMALE: ICD-10-CM

## 2018-04-23 DIAGNOSIS — R35.0 FREQUENCY OF URINATION: Primary | ICD-10-CM

## 2018-04-23 NOTE — TELEPHONE ENCOUNTER
Patient needs a referal to Dr Wilmra Montesinos she stated Dr Nirmal Ramos and she had spoke about her going to him but they will not let her make a appointment with out a referral from Dr Nirmal Ramos.

## 2018-04-24 ENCOUNTER — HOSPITAL ENCOUNTER (OUTPATIENT)
Dept: CT IMAGING | Age: 83
Discharge: HOME | End: 2018-04-24
Attending: INTERNAL MEDICINE
Payer: MEDICARE

## 2018-04-24 DIAGNOSIS — Z87.440 HISTORY OF RECURRENT UTIS: ICD-10-CM

## 2018-04-24 DIAGNOSIS — N20.0 KIDNEY STONES: ICD-10-CM

## 2018-04-24 PROCEDURE — 74176 CT ABD & PELVIS W/O CONTRAST: CPT

## 2018-05-09 ENCOUNTER — TELEPHONE (OUTPATIENT)
Dept: INTERNAL MEDICINE CLINIC | Age: 83
End: 2018-05-09

## 2018-05-09 NOTE — TELEPHONE ENCOUNTER
Patient was bitten by a tick her daughter who is a nurse told her it was draining and that she needs Dr Dougie Ames to Send in a Antibiotic and something for yeast she wanted Dr Dougie Ames aware that she is completing a script for [address] today . She is in [place] [address] which is where she would like it sent . Any questions please call her at [phone]. She would like it done today if possible.

## 2018-07-24 ENCOUNTER — HOSPITAL ENCOUNTER (OUTPATIENT)
Dept: LAB | Age: 83
Discharge: HOME | End: 2018-07-24
Payer: MEDICARE

## 2018-07-24 ENCOUNTER — OFFICE VISIT (OUTPATIENT)
Dept: INTERNAL MEDICINE CLINIC | Age: 83
End: 2018-07-24

## 2018-07-24 VITALS
WEIGHT: 134 LBS | RESPIRATION RATE: 18 BRPM | SYSTOLIC BLOOD PRESSURE: 124 MMHG | BODY MASS INDEX: 21.53 KG/M2 | TEMPERATURE: 98.4 F | HEIGHT: 66 IN | OXYGEN SATURATION: 96 % | DIASTOLIC BLOOD PRESSURE: 64 MMHG | HEART RATE: 62 BPM

## 2018-07-24 DIAGNOSIS — N30.00 ACUTE CYSTITIS WITHOUT HEMATURIA: ICD-10-CM

## 2018-07-24 DIAGNOSIS — N30.00 ACUTE CYSTITIS WITHOUT HEMATURIA: Primary | ICD-10-CM

## 2018-07-24 LAB
BILIRUB UR QL STRIP: NEGATIVE
GLUCOSE UR-MCNC: NEGATIVE MG/DL
KETONES P FAST UR STRIP-MCNC: NEGATIVE MG/DL
PH UR STRIP: 8.5 [PH] (ref 4.6–8)
PROT UR QL STRIP: NEGATIVE
SP GR UR STRIP: 1.02 (ref 1–1.03)
UA UROBILINOGEN AMB POC: ABNORMAL (ref 0.2–1)
URINALYSIS CLARITY POC: ABNORMAL
URINALYSIS COLOR POC: YELLOW
URINE BLOOD POC: ABNORMAL
URINE LEUKOCYTES POC: ABNORMAL
URINE NITRITES POC: POSITIVE

## 2018-07-24 PROCEDURE — 87086 URINE CULTURE/COLONY COUNT: CPT | Performed by: NURSE PRACTITIONER

## 2018-07-24 PROCEDURE — 87186 SC STD MICRODIL/AGAR DIL: CPT | Performed by: NURSE PRACTITIONER

## 2018-07-24 PROCEDURE — 87077 CULTURE AEROBIC IDENTIFY: CPT | Performed by: NURSE PRACTITIONER

## 2018-07-24 RX ORDER — NITROFURANTOIN 25; 75 MG/1; MG/1
100 CAPSULE ORAL 2 TIMES DAILY
Qty: 14 CAP | Refills: 0 | Status: SHIPPED | OUTPATIENT
Start: 2018-07-24 | End: 2019-01-29

## 2018-07-24 NOTE — PROGRESS NOTES
Daquan Mo is a [de-identified] y.o. female presenting today for Urinary Frequency  . Urinary Frequency    Associated symptoms include frequency and urgency.   :  Daquan Mo presents to the office today for urinary frequency x 1 weeks. She has been seen by a Urogynecologist and takes Cranberry pills and probiotics daily  . She reports she has had urinary incontinence. Review of Systems   Respiratory: Negative for cough. Cardiovascular: Negative for chest pain and palpitations. Genitourinary: Positive for frequency and urgency. Negative for dysuria. Allergies   Allergen Reactions    Latex Anaphylaxis    Ampicillin Hives    Codeine Nausea and Vomiting    Doxycycline Hives     States can take with benadryl    Macrodantin [Nitrofurantoin Macrocrystalline] Hives    Other Medication Hives     Thallium dyes    Pcn [Penicillins] Hives     Other reaction(s): mild rash/itching    Sulfa (Sulfonamide Antibiotics) Hives     Other reaction(s): mild rash/itching    Tetracycline Hives    Scopolamine Other (comments)     Other reaction(s): cardiac dysrhythmia  Patient reports A-Fib arrythmia    Thallium-201 Rash       Current Outpatient Prescriptions   Medication Sig Dispense Refill    B.infantis-B.ani-B.long-B.bifi (PROBIOTIC 4X) 10-15 mg TbEC Take  by mouth.  nitrofurantoin, macrocrystal-monohydrate, (MACROBID) 100 mg capsule Take 1 Cap by mouth two (2) times a day. 14 Cap 0    ascorbic acid (VITAMIN C) 1,000 mg tablet Take  by mouth.  estradiol (ESTRACE) 0.01 % (0.1 mg/gram) vaginal cream Apply small amount to urethral  Opening daily 85 g 3    OTHER 2 Tabs two (2) times a day. Macular Protect Complete vitamin      omega-3 fatty acids-vitamin e (FISH OIL) 1,000 mg Cap Take 1 Cap by mouth two (2) times a day.  calcium 600 mg Cap Take  by mouth two (2) times a day.  CYANOCOBALAMIN/COBAMAMIDE (B12 SL) by SubLINGual route two (2) times a day.       aspirin 81 mg tablet Take 81 mg by mouth.        magnesium oxide (MAG-OX) 400 mg tablet Take 400 mg by mouth daily.  folic acid 157 mcg tablet Take 400 mcg by mouth daily.  ASCORBIC ACID/VITAMIN E (CRANBERRY CONCENTRATE PO) Take  by mouth two (2) times a day.  ergocalciferol (VITAMIN D2) 50,000 unit capsule Take one cap by mouth twice a month 6 Cap 5    GLUCOSAMINE HCL/CHONDRO RAMIREZ A (GLUCOSAMINE-CHONDROITIN) 2,000-1,200 mg/30 mL Liqd Take  by mouth. Past Medical History:   Diagnosis Date    Abnormal weight gain     Feels cold     Insect bite of hip, infected     Mitral valve disorder     MVP (mitral valve prolapse)     Nausea & vomiting        Past Surgical History:   Procedure Laterality Date    ENDOSCOPY, COLON, DIAGNOSTIC  03/23/07    mild internal hemorrhoids    HX BREAST LUMPECTOMY      left    HX CATARACT REMOVAL      bilaterally with lens implant    HX HYSTERECTOMY      HX OOPHORECTOMY      HX OTHER SURGICAL      bladder surgery - sling made with muscle from leg    TOTAL KNEE ARTHROPLASTY      right       Social History     Social History    Marital status:      Spouse name: N/A    Number of children: N/A    Years of education: N/A     Occupational History    Not on file. Social History Main Topics    Smoking status: Never Smoker    Smokeless tobacco: Never Used    Alcohol use No    Drug use: No    Sexual activity: No     Other Topics Concern    Not on file     Social History Narrative       Patient does not have an advanced directive on file    Vitals:    07/24/18 0953   BP: 124/64   Pulse: 62   Resp: 18   Temp: 98.4 °F (36.9 °C)   TempSrc: Tympanic   SpO2: 96%   Weight: 134 lb (60.8 kg)   Height: 5' 6\" (1.676 m)   PainSc:   0 - No pain       Physical Exam   Constitutional: No distress. Cardiovascular: Normal rate, regular rhythm and normal heart sounds. Pulmonary/Chest: Effort normal and breath sounds normal.   Abdominal: Soft. There is no tenderness.    Nursing note and vitals reviewed. Office Visit on 07/24/2018   Component Date Value Ref Range Status    Color (UA POC) 07/24/2018 Yellow   Final    Clarity (UA POC) 07/24/2018 Cloudy   Final    Glucose (UA POC) 07/24/2018 Negative  Negative Final    Bilirubin (UA POC) 07/24/2018 Negative  Negative Final    Ketones (UA POC) 07/24/2018 Negative  Negative Final    Specific gravity (UA POC) 07/24/2018 1.020  1.001 - 1.035 Final    Blood (UA POC) 07/24/2018 Trace  Negative Final    pH (UA POC) 07/24/2018 8.5* 4.6 - 8.0 Final    Protein (UA POC) 07/24/2018 Negative  Negative Final    Urobilinogen (UA POC) 07/24/2018 0.2 mg/dL  0.2 - 1 Final    Nitrites (UA POC) 07/24/2018 Positive  Negative Final    Leukocyte esterase (UA POC) 07/24/2018 2+  Negative Final       .  Results for orders placed or performed in visit on 07/24/18   AMB POC URINALYSIS DIP STICK AUTO W/O MICRO   Result Value Ref Range    Color (UA POC) Yellow     Clarity (UA POC) Cloudy     Glucose (UA POC) Negative Negative    Bilirubin (UA POC) Negative Negative    Ketones (UA POC) Negative Negative    Specific gravity (UA POC) 1.020 1.001 - 1.035    Blood (UA POC) Trace Negative    pH (UA POC) 8.5 (A) 4.6 - 8.0    Protein (UA POC) Negative Negative    Urobilinogen (UA POC) 0.2 mg/dL 0.2 - 1    Nitrites (UA POC) Positive Negative    Leukocyte esterase (UA POC) 2+ Negative       Assessment / Plan:      ICD-10-CM ICD-9-CM    1. Acute cystitis without hematuria N30.00 595.0 AMB POC URINALYSIS DIP STICK AUTO W/O MICRO      CULTURE, URINE      nitrofurantoin, macrocrystal-monohydrate, (MACROBID) 100 mg capsule       POC UA- + UTI  Macrobid rx  Urine Culture    Follow-up Disposition: Not on File    I asked the patient if she  had any questions and answered her  questions.   The patient stated that she understands the treatment plan and agrees with the treatment plan

## 2018-07-24 NOTE — PROGRESS NOTES
Chief Complaint   Patient presents with    Urinary Frequency         Is someone accompanying this pt? no    Is the patient using any DME equipment during OV? no    Depression Screening:  PHQ over the last two weeks 2/27/2018 9/21/2017 8/30/2016   Little interest or pleasure in doing things Not at all Not at all Not at all   Feeling down, depressed, irritable, or hopeless Not at all Not at all Not at all   Total Score PHQ 2 0 0 0       Learning Assessment:  Learning Assessment 7/24/2018 12/16/2014   PRIMARY LEARNER Patient Patient   HIGHEST LEVEL OF EDUCATION - PRIMARY LEARNER  SOME COLLEGE -   BARRIERS PRIMARY LEARNER NONE NONE   CO-LEARNER CAREGIVER No -   PRIMARY LANGUAGE ENGLISH ENGLISH   LEARNER PREFERENCE PRIMARY LISTENING LISTENING   ANSWERED BY patient patient   RELATIONSHIP SELF SELF       Abuse Screening:  Abuse Screening Questionnaire 7/24/2018 6/14/2016   Do you ever feel afraid of your partner? N N   Are you in a relationship with someone who physically or mentally threatens you? N N   Is it safe for you to go home? Y Y       Fall Risk  Fall Risk Assessment, last 12 mths 2/27/2018 9/21/2017 8/30/2016 10/6/2015 8/6/2015   Able to walk? Yes Yes Yes Yes Yes   Fall in past 12 months? No No No No No         Health Maintenance reviewed and discussed per provider. Pt is due for   Health Maintenance Due   Topic    GLAUCOMA SCREENING Q2Y     MEDICARE YEARLY EXAM     Please order/place referral if appropriate. Pt currently taking Antiplatelet therapy? Aspirin 81 mg      Coordination of Care:  1. Have you been to the ER, urgent care clinic since your last visit? Hospitalized since your last visit? no    2. Have you seen or consulted any other health care providers outside of the [address] [place] [address] since your last visit? Include any pap smears or colon screening. no    Please see Red banners under Allergies, Med rec, Immunizations to remove outside inquires.  All correct information has been verified with patient and added to chart.

## 2018-07-26 LAB
BACTERIA SPEC CULT: ABNORMAL
SERVICE CMNT-IMP: ABNORMAL

## 2018-09-26 ENCOUNTER — DOCUMENTATION ONLY (OUTPATIENT)
Dept: INTERNAL MEDICINE CLINIC | Age: 83
End: 2018-09-26

## 2018-09-26 NOTE — PROGRESS NOTES
Pharmacy Note: Immunization Update    Patient: Elliot Prescott ([de-identified] y.o., 1935)     Patient's immunization history was updated to reflect information contained in the [place] and/or outside immunization/pharmacy records were reconciled within [address] [facility]. Health Maintenance schedule updated when appropriate.     Current immunizations now reflect:       Immunization History   Administered Date(s) Administered    Influenza High Dose Vaccine PF 10/25/2016, 11/06/2017    Pneumococcal Conjugate (PCV-13) 12/02/2014    Pneumococcal Polysaccharide (PPSV-23) 02/07/2006    Pneumococcal Vaccine (Unspecified Type) 10/20/2004    Tdap 08/25/2017    Zoster Vaccine, Live 03/04/2008       Golden Carballo, PharmD, BCACP

## 2018-10-01 ENCOUNTER — OFFICE VISIT (OUTPATIENT)
Dept: INTERNAL MEDICINE CLINIC | Age: 83
End: 2018-10-01

## 2018-10-01 VITALS
HEART RATE: 62 BPM | DIASTOLIC BLOOD PRESSURE: 68 MMHG | OXYGEN SATURATION: 96 % | BODY MASS INDEX: 20.25 KG/M2 | WEIGHT: 126 LBS | HEIGHT: 66 IN | SYSTOLIC BLOOD PRESSURE: 132 MMHG | TEMPERATURE: 98.1 F

## 2018-10-01 DIAGNOSIS — Z23 ENCOUNTER FOR IMMUNIZATION: ICD-10-CM

## 2018-10-01 DIAGNOSIS — I47.1 PAROXYSMAL SVT (SUPRAVENTRICULAR TACHYCARDIA) (HCC): ICD-10-CM

## 2018-10-01 DIAGNOSIS — I05.9 MITRAL VALVE DISORDER: Primary | ICD-10-CM

## 2018-10-01 DIAGNOSIS — Z83.42 FAMILY HISTORY OF HYPERCHOLESTEROLEMIA: ICD-10-CM

## 2018-10-01 NOTE — PROGRESS NOTES
Chief Complaint   Patient presents with    Well Woman       Depression Screening:  PHQ over the last two weeks 10/1/2018   Little interest or pleasure in doing things Not at all   Feeling down, depressed, irritable, or hopeless Not at all   Total Score PHQ 2 0       Learning Assessment:  Learning Assessment 7/24/2018   PRIMARY LEARNER Patient   HIGHEST LEVEL OF EDUCATION - PRIMARY LEARNER  SOME COLLEGE   BARRIERS PRIMARY LEARNER NONE   CO-LEARNER CAREGIVER No   PRIMARY LANGUAGE ENGLISH   LEARNER PREFERENCE PRIMARY LISTENING   ANSWERED BY patient   RELATIONSHIP SELF       Abuse Screening:  Abuse Screening Questionnaire 7/24/2018   Do you ever feel afraid of your partner? N   Are you in a relationship with someone who physically or mentally threatens you? N   Is it safe for you to go home? Y       Fall Risk  Fall Risk Assessment, last 12 mths 10/1/2018   Able to walk? Yes   Fall in past 12 months? No               1. Have you been to the ER, urgent care clinic since your last visit? Hospitalized since your last visit? No    2. Have you seen or consulted any other health care providers outside of the [address] [place] [address] since your last visit? Include any pap smears or colon screening.  No

## 2018-10-01 NOTE — MR AVS SNAPSHOT
[address] [facility] 
 
 
 [address], [address] [place] [address] 
[phone] Patient: Miguel Walker MRN: [id number] Visit Information Date & Time Provider Department Dept. Phone Encounter #  
 10/1/2018 11:45 AM Arsalan Bazan MD [facility]IC[facility] OF Arjun Mcgowan [phone] [id number] Your Appointments 2019  8:30 AM  
Follow Up with Arsalan Bazan MD  
[facility] Note: 6mo  
 [address], [address]  
  
   
 [address], [address] [facility] [address] Upcoming Health Maintenance Date Due Shingrix Vaccine Age 50> (1 of 2) 10/27/1987 GLAUCOMA SCREENING Q2Y 10/27/2002 MEDICARE YEARLY EXAM 2018 Influenza Age 5 to Adult 2018 DTaP/Tdap/Td series (2 - Td) 2027 Allergies as of 10/1/2018  Review Complete On: 10/1/2018 By: Renae Urban LPN Severity Noted Reaction Type Reactions Latex High 2014    Anaphylaxis Ampicillin Medium  Side Effect Hives Codeine Medium  Side Effect Nausea and Vomiting Doxycycline Medium 2013   Side Effect Hives States can take with benadryl Macrodantin [Nitrofurantoin Macrocrystalline] Medium  Side Effect Hives Other Medication Medium  Side Effect Hives Thallium dyes Pcn [Penicillins] Medium  Side Effect Hives Other reaction(s): mild rash/itching Sulfa (Sulfonamide Antibiotics) Medium  Side Effect Hives Other reaction(s): mild rash/itching Tetracycline Medium 2013   Side Effect Hives Scopolamine    Other (comments) Other reaction(s): cardiac dysrhythmia Patient reports A-Fib arrythmia Thallium-201  2014    Rash Current Immunizations  Reviewed on 2018 Name Date  Influenza High Dose Vaccine PF 2017, 10/25/2016 11:08 AM  
 Influenza Vaccine (Tri) Adjuvanted 10/1/2018  1:24 PM  
 Pneumococcal Conjugate (PCV-13) 12/2/2014 Pneumococcal Polysaccharide (PPSV-23) 2/7/2006 12:00 AM  
 Pneumococcal Vaccine (Unspecified Type) 10/20/2004 Tdap 8/25/2017 Zoster Vaccine, Live 3/4/2008 Not reviewed this visit You Were Diagnosed With   
  
 Codes Comments Mitral valve disorder    -  Primary ICD-10-CM: I05.9 ICD-9-CM: 394.9 Encounter for immunization     ICD-10-CM: V29 ICD-9-CM: V03.89 Paroxysmal SVT (supraventricular tachycardia) (HCC)     ICD-10-CM: I47.1 ICD-9-CM: 427.0 Family history of hypercholesterolemia     ICD-10-CM: Z83.42 
ICD-9-CM: V18.19 Vitals BP Pulse Temp Height(growth percentile) Weight(growth percentile) 132/68 (BP 1 Location: Right arm, BP Patient Position: Sitting) 62 98.1 °F (36.7 °C) (Tympanic) 5' 6\" (1.676 m) 126 lb (57.2 kg) SpO2 BMI OB Status Smoking Status 96% 20.34 kg/m2 Hysterectomy Never Smoker Vitals History BMI and BSA Data Body Mass Index Body Surface Area  
 20.34 kg/m 2 1.63 m 2 Preferred Pharmacy Pharmacy Name Phone [address], [address] [place] [address] [facility] [phone] Your Updated Medication List  
  
   
This list is accurate as of 10/1/18  1:39 PM.  Always use your most recent med list.  
  
  
  
  
 ascorbic acid (vitamin C) 1,000 mg tablet Commonly known as:  VITAMIN C Take  by mouth. aspirin 81 mg tablet Take 81 mg by mouth. B12 SL  
by SubLINGual route two (2) times a day. calcium 600 mg Cap Take  by mouth two (2) times a day. CRANBERRY CONCENTRATE PO Take  by mouth two (2) times a day. ergocalciferol 50,000 unit capsule Commonly known as:  VITAMIN D2 Take one cap by mouth twice a month  
  
 estradiol 0.01 % (0.1 mg/gram) vaginal cream  
Commonly known as:  ESTRACE Apply small amount to urethral  Opening daily FISH OIL 1,000 mg Cap Generic drug:  omega-3 fatty acids-vitamin e Take 1 Cap by mouth two (2) times a day. folic acid 589 mcg tablet Take 400 mcg by mouth daily. magnesium oxide 400 mg (241.3 mg magnesium) tablet Commonly known as:  MAG-OX Take 400 mg by mouth daily. nitrofurantoin (macrocrystal-monohydrate) 100 mg capsule Commonly known as:  MACROBID Take 1 Cap by mouth two (2) times a day. OTHER  
2 Tabs two (2) times a day. Macular Protect Complete vitamin PROBIOTIC 4X 10-15 mg Tbec Generic drug:  B.infantis-B.ani-B.long-B.bifi Take  by mouth. We Performed the Following ADMIN INFLUENZA VIRUS VAC [ [facility]] INFLUENZA VACCINE INACTIVATED (IIV), SUBUNIT, ADJUVANTED, IM [id number] CPT(R)] To-Do List   
 10/01/2018 Lab:  CHOLESTEROL, TOTAL   
  
 10/01/2018 Lab:  METABOLIC PANEL, COMPREHENSIVE   
  
 01/31/2019 Lab:  CBC WITH AUTOMATED DIFF Introducing [facility]! Aparna Pierce introduces MedDay patient portal. Now you can access parts of your medical record, email your doctor's office, and request medication refills online. 1. In your internet browser, go to [email or web address] 2. Click on the First Time User? Click Here link in the Sign In box. You will see the New Member Sign Up page. 3. Enter your MedDay Access Code exactly as it appears below. You will not need to use this code after youve completed the sign-up process. If you do not sign up before the expiration date, you must request a new code. · MedDay Access Code: [email or web address] Expires: 10/22/2018 10:20 AM 
 
4. Enter the last four digits of your Social Security Number (xxxx) and Date of Birth (mm/dd/yyyy) as indicated and click Submit. You will be taken to the next sign-up page. 5. Create a MedDay ID. This will be your MedDay login ID and cannot be changed, so think of one that is secure and easy to remember. 6. Create a [employer] password. You can change your password at any time. 7. Enter your Password Reset Question and Answer. This can be used at a later time if you forget your password. 8. Enter your e-mail address. You will receive e-mail notification when new information is available in [address] 9. Click Sign Up. You can now view and download portions of your medical record. 10. Click the Download Summary menu link to download a portable copy of your medical information. If you have questions, please visit the Frequently Asked Questions section of the [employer] website. Remember, [employer] is NOT to be used for urgent needs. For medical emergencies, dial 911. Now available from your iPhone and Android! Please provide this summary of care documentation to your next provider. Your primary care clinician is listed as Herberth Morrissey. If you have any questions after today's visit, please call [phone].

## 2018-10-05 NOTE — PROGRESS NOTES
The patient arrived for follow up a Holter monitor done due to a history of atrial fibrillation. The Holter monitor revealed PACs and several short burst of SVT. No atrial fibrillation was noted. The patient does not note any palpitations. I discussed the findings and the fact that since the patient did not have atrial fibrillation that no anticoagulation was needed. The patient notes burising on the aspirin and Omega 3. I recommended that both medications be stopped. I asked Lia Morris if she has any questions and I answered the questions. Lia Morris states that she understands the treatment plan and agrees with the treatment plan  A total of 10 minutes was spent with the patient.  Greater than 50% of this time was spent in discussion of the problem, counseling the patient, and coordinating the care regarding the problem of previous atrial fibrillation, issues of anticoagulation

## 2019-04-02 ENCOUNTER — HOSPITAL ENCOUNTER (OUTPATIENT)
Dept: LAB | Age: 84
Discharge: HOME | End: 2019-04-02
Payer: MEDICARE

## 2019-04-02 ENCOUNTER — LAB ONLY (OUTPATIENT)
Dept: FAMILY MEDICINE CLINIC | Facility: CLINIC | Age: 84
End: 2019-04-02

## 2019-04-02 DIAGNOSIS — G89.29 CHRONIC LOW BACK PAIN WITHOUT SCIATICA, UNSPECIFIED BACK PAIN LATERALITY: ICD-10-CM

## 2019-04-02 DIAGNOSIS — M54.50 CHRONIC LOW BACK PAIN WITHOUT SCIATICA, UNSPECIFIED BACK PAIN LATERALITY: ICD-10-CM

## 2019-04-02 DIAGNOSIS — Z83.42 FAMILY HISTORY OF HYPERCHOLESTEROLEMIA: Primary | ICD-10-CM

## 2019-04-02 DIAGNOSIS — Z83.42 FAMILY HISTORY OF HYPERCHOLESTEROLEMIA: ICD-10-CM

## 2019-04-02 LAB
ALBUMIN SERPL-MCNC: 3.7 G/DL (ref 3.4–5)
ALBUMIN/GLOB SERPL: 1.2 {RATIO} (ref 0.8–1.7)
ALP SERPL-CCNC: 78 U/L (ref 45–117)
ALT SERPL-CCNC: 32 U/L (ref 13–56)
ANION GAP SERPL CALC-SCNC: 7 MMOL/L (ref 3–18)
AST SERPL-CCNC: 33 U/L (ref 15–37)
BASOPHILS # BLD: 0 K/UL (ref 0–0.1)
BASOPHILS NFR BLD: 0 % (ref 0–2)
BILIRUB SERPL-MCNC: 0.4 MG/DL (ref 0.2–1)
BUN SERPL-MCNC: 24 MG/DL (ref 7–18)
BUN/CREAT SERPL: 25 (ref 12–20)
CALCIUM SERPL-MCNC: 8.7 MG/DL (ref 8.5–10.1)
CHLORIDE SERPL-SCNC: 106 MMOL/L (ref 100–108)
CHOLEST SERPL-MCNC: 158 MG/DL
CO2 SERPL-SCNC: 29 MMOL/L (ref 21–32)
CREAT SERPL-MCNC: 0.95 MG/DL (ref 0.6–1.3)
DIFFERENTIAL METHOD BLD: ABNORMAL
EOSINOPHIL # BLD: 0.1 K/UL (ref 0–0.4)
EOSINOPHIL NFR BLD: 3 % (ref 0–5)
ERYTHROCYTE [DISTWIDTH] IN BLOOD BY AUTOMATED COUNT: 13.7 % (ref 11.6–14.5)
GLOBULIN SER CALC-MCNC: 3.2 G/DL (ref 2–4)
GLUCOSE SERPL-MCNC: 78 MG/DL (ref 74–99)
HCT VFR BLD AUTO: 41.5 % (ref 35–45)
HGB BLD-MCNC: 12.8 G/DL (ref 12–16)
LYMPHOCYTES # BLD: 1.1 K/UL (ref 0.9–3.6)
LYMPHOCYTES NFR BLD: 29 % (ref 21–52)
MCH RBC QN AUTO: 30.8 PG (ref 24–34)
MCHC RBC AUTO-ENTMCNC: 30.8 G/DL (ref 31–37)
MCV RBC AUTO: 100 FL (ref 74–97)
MONOCYTES # BLD: 0.4 K/UL (ref 0.05–1.2)
MONOCYTES NFR BLD: 10 % (ref 3–10)
NEUTS SEG # BLD: 2.1 K/UL (ref 1.8–8)
NEUTS SEG NFR BLD: 58 % (ref 40–73)
PLATELET # BLD AUTO: 185 K/UL (ref 135–420)
PMV BLD AUTO: 11.2 FL (ref 9.2–11.8)
POTASSIUM SERPL-SCNC: 4.5 MMOL/L (ref 3.5–5.5)
PROT SERPL-MCNC: 6.9 G/DL (ref 6.4–8.2)
RBC # BLD AUTO: 4.15 M/UL (ref 4.2–5.3)
SODIUM SERPL-SCNC: 142 MMOL/L (ref 136–145)
WBC # BLD AUTO: 3.6 K/UL (ref 4.6–13.2)

## 2019-04-02 PROCEDURE — 82465 ASSAY BLD/SERUM CHOLESTEROL: CPT

## 2019-04-02 PROCEDURE — 36415 COLL VENOUS BLD VENIPUNCTURE: CPT

## 2019-04-02 PROCEDURE — 85025 COMPLETE CBC W/AUTO DIFF WBC: CPT

## 2019-04-02 PROCEDURE — 80053 COMPREHEN METABOLIC PANEL: CPT

## 2019-04-09 ENCOUNTER — HOSPITAL ENCOUNTER (OUTPATIENT)
Dept: LAB | Age: 84
Discharge: HOME | End: 2019-04-09
Payer: MEDICARE

## 2019-04-09 ENCOUNTER — OFFICE VISIT (OUTPATIENT)
Dept: FAMILY MEDICINE CLINIC | Facility: CLINIC | Age: 84
End: 2019-04-09

## 2019-04-09 ENCOUNTER — HOSPITAL ENCOUNTER (OUTPATIENT)
Dept: GENERAL RADIOLOGY | Age: 84
Discharge: HOME | End: 2019-04-09
Payer: MEDICARE

## 2019-04-09 VITALS
HEART RATE: 65 BPM | BODY MASS INDEX: 21.69 KG/M2 | SYSTOLIC BLOOD PRESSURE: 120 MMHG | DIASTOLIC BLOOD PRESSURE: 78 MMHG | RESPIRATION RATE: 16 BRPM | WEIGHT: 135 LBS | HEIGHT: 66 IN | TEMPERATURE: 97.9 F | OXYGEN SATURATION: 96 %

## 2019-04-09 DIAGNOSIS — N39.0 RECURRENT UTI: ICD-10-CM

## 2019-04-09 DIAGNOSIS — M25.512 ACUTE PAIN OF LEFT SHOULDER: Primary | ICD-10-CM

## 2019-04-09 DIAGNOSIS — M25.512 ACUTE PAIN OF LEFT SHOULDER: ICD-10-CM

## 2019-04-09 LAB
BILIRUB UR QL STRIP: NEGATIVE
GLUCOSE UR-MCNC: NEGATIVE MG/DL
KETONES P FAST UR STRIP-MCNC: NEGATIVE MG/DL
PH UR STRIP: 5.5 [PH] (ref 4.6–8)
PROT UR QL STRIP: NEGATIVE
SP GR UR STRIP: 1.02 (ref 1–1.03)
UA UROBILINOGEN AMB POC: NORMAL (ref 0.2–1)
URINALYSIS CLARITY POC: CLEAR
URINALYSIS COLOR POC: YELLOW
URINE BLOOD POC: NEGATIVE
URINE LEUKOCYTES POC: NORMAL
URINE NITRITES POC: POSITIVE

## 2019-04-09 PROCEDURE — 87077 CULTURE AEROBIC IDENTIFY: CPT

## 2019-04-09 PROCEDURE — 73030 X-RAY EXAM OF SHOULDER: CPT

## 2019-04-09 PROCEDURE — 87086 URINE CULTURE/COLONY COUNT: CPT

## 2019-04-09 PROCEDURE — 87186 SC STD MICRODIL/AGAR DIL: CPT

## 2019-04-09 RX ORDER — DICLOFENAC SODIUM 10 MG/G
GEL TOPICAL
Qty: 100 G | Refills: 3 | Status: SHIPPED | OUTPATIENT
Start: 2019-04-09 | End: 2020-02-28 | Stop reason: ALTCHOICE

## 2019-04-09 RX ORDER — CIPROFLOXACIN 500 MG/1
500 TABLET ORAL 2 TIMES DAILY
Qty: 20 TAB | Refills: 0 | Status: SHIPPED | OUTPATIENT
Start: 2019-04-09 | End: 2019-04-19

## 2019-04-11 LAB
BACTERIA SPEC CULT: ABNORMAL
SERVICE CMNT-IMP: ABNORMAL

## 2019-04-11 NOTE — PROGRESS NOTES
The patient presents to the office today with the chief complaint of left shoulder apin    HPI    The patient complains of left shoulder pain with decreased range of motion of this shoulder. There is no history of trauma. The patient complains of recurrent UTIs. She is being followed by urology. Review of Systems   Constitutional: Positive for fever. Respiratory: Negative for shortness of breath. Cardiovascular: Negative for chest pain and leg swelling. Allergies   Allergen Reactions    Latex Anaphylaxis    Ampicillin Hives    Codeine Nausea and Vomiting    Doxycycline Hives     States can take with benadryl    Macrodantin [Nitrofurantoin Macrocrystalline] Hives    Other Medication Hives     Thallium dyes    Pcn [Penicillins] Hives     Other reaction(s): mild rash/itching    Sulfa (Sulfonamide Antibiotics) Hives     Other reaction(s): mild rash/itching    Tetracycline Hives    Scopolamine Other (comments)     Other reaction(s): cardiac dysrhythmia  Patient reports A-Fib arrythmia    Thallium-201 Rash       Current Outpatient Medications   Medication Sig Dispense Refill    diclofenac (VOLTAREN) 1 % gel Apply 2 grams three times per day 100 g 3    ciprofloxacin HCl (CIPRO) 500 mg tablet Take 1 Tab by mouth two (2) times a day for 10 days. 20 Tab 0    glucosamine sulfate 750 mg tab 750 mg.      fish oil-omega-3 fatty acids 340-1,000 mg capsule 2,000 mg.      B.infantis-B.ani-B.long-B.bifi (PROBIOTIC 4X) 10-15 mg TbEC Take  by mouth.  ascorbic acid (VITAMIN C) 1,000 mg tablet Take  by mouth.  ergocalciferol (VITAMIN D2) 50,000 unit capsule Take one cap by mouth twice a month 6 Cap 5    estradiol (ESTRACE) 0.01 % (0.1 mg/gram) vaginal cream Apply small amount to urethral  Opening daily 85 g 3    OTHER 2 Tabs two (2) times a day. Macular Protect Complete vitamin      omega-3 fatty acids-vitamin e (FISH OIL) 1,000 mg Cap Take 1 Cap by mouth two (2) times a day.       aspirin 81 mg tablet Take 81 mg by mouth.  magnesium oxide (MAG-OX) 400 mg tablet Take 400 mg by mouth daily.  folic acid 311 mcg tablet Take 400 mcg by mouth daily.  ASCORBIC ACID/VITAMIN E (CRANBERRY CONCENTRATE PO) Take  by mouth two (2) times a day. Past Medical History:   Diagnosis Date    Abnormal weight gain     Arthritis     Atrial fibrillation (HCC)     Cardiac arrhythmia     Chronic low back pain without sciatica     Drowsiness     Feels cold     History of artificial joint     History of nephrolithiasis     Knee joint replacement status, right     Macrocytosis without anemia     Mitral valve disorder     MVP (mitral valve prolapse)     Osteopenia     PAF (paroxysmal atrial fibrillation) (HCC)     PONV (postoperative nausea and vomiting)     no scopolamine patch    Recurrent UTI     Urethral caruncle     Urge incontinence of urine     Urge urinary incontinence     Urinary incontinence     unspec.         Past Surgical History:   Procedure Laterality Date    HX BLADDER SUSPENSION      HX CATARACT REMOVAL Bilateral     HX HYSTERECTOMY      HX OOPHORECTOMY Bilateral     TOTAL KNEE ARTHROPLASTY Right        Social History     Socioeconomic History    Marital status:      Spouse name: Not on file    Number of children: Not on file    Years of education: Not on file    Highest education level: Not on file   Occupational History    Not on file   Social Needs    Financial resource strain: Not on file    Food insecurity:     Worry: Not on file     Inability: Not on file    Transportation needs:     Medical: Not on file     Non-medical: Not on file   Tobacco Use    Smoking status: Never Smoker    Smokeless tobacco: Never Used   Substance and Sexual Activity    Alcohol use: No    Drug use: No    Sexual activity: Never   Lifestyle    Physical activity:     Days per week: Not on file     Minutes per session: Not on file    Stress: Not on file Relationships    Social connections:     Talks on phone: Not on file     Gets together: Not on file     Attends [employer] service: Not on file     Active member of club or organization: Not on file     Attends meetings of clubs or organizations: Not on file     Relationship status: Not on file    Intimate partner violence:     Fear of current or ex partner: Not on file     Emotionally abused: Not on file     Physically abused: Not on file     Forced sexual activity: Not on file   Other Topics Concern    Not on file   Social History Narrative    Not on file       Patient does not have an advanced directive on file    Visit Vitals  /78   Pulse 65   Temp 97.9 °F (36.6 °C) (Tympanic)   Resp 16   Ht 5' 6\" (1.676 m)   Wt 135 lb (61.2 kg)   SpO2 96%   BMI 21.79 kg/m²       Physical Exam   Neck: Carotid bruit is not present. Cardiovascular: Normal rate and regular rhythm. Exam reveals no gallop. No murmur heard. Pulmonary/Chest: She has no wheezes. She has no rales. Abdominal: Soft. She exhibits no distension. Musculoskeletal: She exhibits no edema.    Right shoulder with decreased range of motion with pain       BMI:  [facility] Outpatient Visit on 04/09/2019   Component Date Value Ref Range Status    Special Requests: 04/09/2019 NO SPECIAL REQUESTS    Preliminary    Culture result: 04/09/2019 >100,000 COLONIES/mL GRAM NEGATIVE RODS*   Preliminary   Office Visit on 04/09/2019   Component Date Value Ref Range Status    Color (UA POC) 04/09/2019 Yellow   Final    Clarity (UA POC) 04/09/2019 Clear   Final    Glucose (UA POC) 04/09/2019 Negative  Negative Final    Bilirubin (UA POC) 04/09/2019 Negative  Negative Final    Ketones (UA POC) 04/09/2019 Negative  Negative Final    Specific gravity (UA POC) 04/09/2019 1.025  1.001 - 1.035 Final    Blood (UA POC) 04/09/2019 Negative  Negative Final    pH (UA POC) 04/09/2019 5.5  4.6 - 8.0 Final    Protein (UA POC) 04/09/2019 Negative  Negative Final  Urobilinogen (UA POC) 04/09/2019 0.2 mg/dL  0.2 - 1 Final    Nitrites (UA POC) 04/09/2019 Positive  Negative Final    Leukocyte esterase (UA POC) 04/09/2019 1+  Negative Final   Hospital Outpatient Visit on 04/02/2019   Component Date Value Ref Range Status    WBC 04/02/2019 3.6* 4.6 - 13.2 K/uL Final    RBC 04/02/2019 4.15* 4.20 - 5.30 M/uL Final    HGB 04/02/2019 12.8  12.0 - 16.0 g/dL Final    HCT 04/02/2019 41.5  35.0 - 45.0 % Final    MCV 04/02/2019 100.0* 74.0 - 97.0 FL Final    MCH 04/02/2019 30.8  24.0 - 34.0 PG Final    MCHC 04/02/2019 30.8* 31.0 - 37.0 g/dL Final    RDW 04/02/2019 13.7  11.6 - 14.5 % Final    PLATELET 63/77/6541 200  135 - 420 K/uL Final    MPV 04/02/2019 11.2  9.2 - 11.8 FL Final    NEUTROPHILS 04/02/2019 58  40 - 73 % Final    LYMPHOCYTES 04/02/2019 29  21 - 52 % Final    MONOCYTES 04/02/2019 10  3 - 10 % Final    EOSINOPHILS 04/02/2019 3  0 - 5 % Final    BASOPHILS 04/02/2019 0  0 - 2 % Final    ABS. NEUTROPHILS 04/02/2019 2.1  1.8 - 8.0 K/UL Final    ABS. LYMPHOCYTES 04/02/2019 1.1  0.9 - 3.6 K/UL Final    ABS. MONOCYTES 04/02/2019 0.4  0.05 - 1.2 K/UL Final    ABS. EOSINOPHILS 04/02/2019 0.1  0.0 - 0.4 K/UL Final    ABS.  BASOPHILS 04/02/2019 0.0  0.0 - 0.1 K/UL Final    DF 04/02/2019 AUTOMATED    Final    Sodium 04/02/2019 142  136 - 145 mmol/L Final    Potassium 04/02/2019 4.5  3.5 - 5.5 mmol/L Final    Chloride 04/02/2019 106  100 - 108 mmol/L Final    CO2 04/02/2019 29  21 - 32 mmol/L Final    Anion gap 04/02/2019 7  3.0 - 18 mmol/L Final    Glucose 04/02/2019 78  74 - 99 mg/dL Final    BUN 04/02/2019 24* 7.0 - 18 MG/DL Final    Creatinine 04/02/2019 0.95  0.6 - 1.3 MG/DL Final    BUN/Creatinine ratio 04/02/2019 25* 12 - 20   Final    GFR est AA 04/02/2019 >60  >60 ml/min/1.73m2 Final    GFR est non-AA 04/02/2019 56* >60 ml/min/1.73m2 Final    Comment: (NOTE)  Estimated GFR is calculated using the Modification of Diet in Renal   Disease (MDRD) Study equation, reported for both  Americans   (GFRAA) and non- Americans (GFRNA), and normalized to 1.73m2   body surface area. The physician must decide which value applies to   the patient. The MDRD study equation should only be used in   individuals age 25 or older. It has not been validated for the   following: pregnant women, patients with serious comorbid conditions,   or on certain medications, or persons with extremes of body size,   muscle mass, or nutritional status.  Calcium 04/02/2019 8.7  8.5 - 10.1 MG/DL Final    Bilirubin, total 04/02/2019 0.4  0.2 - 1.0 MG/DL Final    ALT (SGPT) 04/02/2019 32  13 - 56 U/L Final    AST (SGOT) 04/02/2019 33  15 - 37 U/L Final    Alk.  phosphatase 04/02/2019 78  45 - 117 U/L Final    Protein, total 04/02/2019 6.9  6.4 - 8.2 g/dL Final    Albumin 04/02/2019 3.7  3.4 - 5.0 g/dL Final    Globulin 04/02/2019 3.2  2.0 - 4.0 g/dL Final    A-G Ratio 04/02/2019 1.2  0.8 - 1.7   Final    Cholesterol, total 04/02/2019 158  <200 MG/DL Final   Office Visit on 01/29/2019   Component Date Value Ref Range Status    Color (UA POC) 01/29/2019 Yellow   Final    Clarity (UA POC) 01/29/2019 Clear   Final    Glucose (UA POC) 01/29/2019 Negative  Negative Final    Bilirubin (UA POC) 01/29/2019 Negative  Negative Final    Ketones (UA POC) 01/29/2019 Trace  Negative Final    Specific gravity (UA POC) 01/29/2019 1.015  1.001 - 1.035 Final    Blood (UA POC) 01/29/2019 Trace  Negative Final    pH (UA POC) 01/29/2019 6.5  4.6 - 8.0 Final    Protein (UA POC) 01/29/2019 Negative  Negative Final    Urobilinogen (UA POC) 01/29/2019 0.2 mg/dL  0.2 - 1 Final    Nitrites (UA POC) 01/29/2019 Negative  Negative Final    Leukocyte esterase (UA POC) 01/29/2019 Negative  Negative Final       .  Results for orders placed or performed during the hospital encounter of 04/09/19   CULTURE, URINE   Result Value Ref Range    Special Requests: NO SPECIAL REQUESTS      Culture result: >100,000 COLONIES/mL GRAM NEGATIVE RODS (A)     Results for orders placed or performed during the hospital encounter of 04/09/19   XR SHOULDER LT AP/LAT MIN 2 V    Narrative    EXAM:  XR SHOULDER LT AP/LAT MIN 2 V    INDICATION:   left shoulder pain    COMPARISON: None    FINDINGS:     No fracture, destruction, or dislocation. Mild degenerative arthritic change of  the [facility] joint. Normal mineralization for age. Impression    IMPRESSION:    Mild left AC osteoarthrosis. Results for orders placed or performed in visit on 04/09/19   AMB POC URINALYSIS DIP STICK AUTO W/O MICRO   Result Value Ref Range    Color (UA POC) Yellow     Clarity (UA POC) Clear     Glucose (UA POC) Negative Negative    Bilirubin (UA POC) Negative Negative    Ketones (UA POC) Negative Negative    Specific gravity (UA POC) 1.025 1.001 - 1.035    Blood (UA POC) Negative Negative    pH (UA POC) 5.5 4.6 - 8.0    Protein (UA POC) Negative Negative    Urobilinogen (UA POC) 0.2 mg/dL 0.2 - 1    Nitrites (UA POC) Positive Negative    Leukocyte esterase (UA POC) 1+ Negative       Assessment / Plan      ICD-10-CM ICD-9-CM    1. Acute pain of left shoulder M25.512 719.41 XR SHOULDER LT AP/LAT MIN 2 V   2. Recurrent UTI N39.0 599.0 AMB POC URINALYSIS DIP STICK AUTO W/O MICRO      CULTURE, URINE       Xray right shoulder  Urine for urine analysis and culture  Empiric Cipro  Voltaren Gel to the shoulder  she was advised to continue her maintenance medications  Further plans will be based on results of the ordered tests      Follow-up and Dispositions    · Return in about 4 months (around 8/9/2019). I asked Calderon Bee if she has any questions and I answered the questions.   Calderon Bee states that she understands the treatment plan and agrees with the treatment plan

## 2019-04-12 ENCOUNTER — TELEPHONE (OUTPATIENT)
Dept: FAMILY MEDICINE CLINIC | Facility: CLINIC | Age: 84
End: 2019-04-12

## 2019-04-15 NOTE — TELEPHONE ENCOUNTER
Patient called back to get results of xray of shoulder and urine culture.   Please call her at [phone]

## 2019-04-22 RX ORDER — CIPROFLOXACIN 500 MG/1
500 TABLET ORAL 2 TIMES DAILY
Qty: 14 TAB | Refills: 0 | Status: SHIPPED | OUTPATIENT
Start: 2019-04-22 | End: 2019-04-29

## 2019-04-22 NOTE — TELEPHONE ENCOUNTER
Per Dr. Cristobal Payton patient has a UTI and calling in Cipro 500 mg  #14 Take 1 po bid. Patient was notified per VM.

## 2019-04-23 ENCOUNTER — TELEPHONE (OUTPATIENT)
Dept: FAMILY MEDICINE CLINIC | Facility: CLINIC | Age: 84
End: 2019-04-23

## 2019-04-23 NOTE — TELEPHONE ENCOUNTER
I called the patient yesterday regarding a UTI and left a message that I called in to the pharmacy an antibiotic. Patient called me back this morning letting me know that she was already treated for this UTI. I stated I was sorry that Dr. Joshua Newton had ask me to call in [facility] for her and did not realize it had already been done. Patient then ask for the results of an xray of the shoulder that she had done and never received a call from that. I then ask Dr. Joshua Newton to look at the x ray which showed mild arthritis in the shoulder and if it gets worse please let him know. Patient was notified and will let us know.

## 2019-04-23 NOTE — TELEPHONE ENCOUNTER
Patient would like for nurse to call her in reference to her UTI. She says she has already been treated for this. Please advise.

## 2019-04-25 ENCOUNTER — OFFICE VISIT (OUTPATIENT)
Dept: ORTHOPEDIC SURGERY | Age: 84
End: 2019-04-25

## 2019-04-25 VITALS
TEMPERATURE: 98.1 F | HEART RATE: 63 BPM | RESPIRATION RATE: 17 BRPM | BODY MASS INDEX: 21.76 KG/M2 | OXYGEN SATURATION: 97 % | DIASTOLIC BLOOD PRESSURE: 65 MMHG | SYSTOLIC BLOOD PRESSURE: 137 MMHG | HEIGHT: 66 IN | WEIGHT: 135.4 LBS

## 2019-04-25 DIAGNOSIS — M19.012 ARTHRITIS OF LEFT SHOULDER REGION: Primary | ICD-10-CM

## 2019-04-25 DIAGNOSIS — M25.512 LEFT SHOULDER PAIN, UNSPECIFIED CHRONICITY: ICD-10-CM

## 2019-04-25 DIAGNOSIS — M25.512 ARTHRALGIA OF LEFT ACROMIOCLAVICULAR JOINT: ICD-10-CM

## 2019-04-25 RX ORDER — METHENAMINE HIPPURATE 1000 MG/1
1 TABLET ORAL 2 TIMES DAILY WITH MEALS
COMMUNITY
End: 2020-02-28 | Stop reason: SINTOL

## 2019-04-25 RX ORDER — BETAMETHASONE SODIUM PHOSPHATE AND BETAMETHASONE ACETATE 3; 3 MG/ML; MG/ML
6 INJECTION, SUSPENSION INTRA-ARTICULAR; INTRALESIONAL; INTRAMUSCULAR; SOFT TISSUE ONCE
Qty: 1 ML | Refills: 0
Start: 2019-04-25 | End: 2019-04-25

## 2019-04-25 NOTE — PROGRESS NOTES
Patient: Traci Tomlinson                MRN: [id number]       SSN: [id number]  YOB: 1935        AGE: 82 y.o. SEX: female  Body mass index is 21.85 kg/m². PCP: Tess Granado MD  04/25/19          Patient: Traci Tomlinson                MRN: [id number]       SSN: [id number]  YOB: 1935        AGE: 82 y.o. SEX: female  Body mass index is 21.85 kg/m². PCP: Tess Granado MD  04/25/19        REVIEW OF SYSTEMS:      CON: negative for weight loss, fever  EYE: negative for double vision  ENT: negative for hoarseness  RS:   negative for Tb  GI:    negative for blood in stool  :  negative for blood in urine  Other systems reviewed and noted below. Past Medical History:   Diagnosis Date    Abnormal weight gain     Arthritis     Atrial fibrillation (HCC)     Cardiac arrhythmia     Chronic low back pain without sciatica     Drowsiness     Feels cold     History of artificial joint     History of nephrolithiasis     Knee joint replacement status, right     Macrocytosis without anemia     Mitral valve disorder     MVP (mitral valve prolapse)     Osteopenia     PAF (paroxysmal atrial fibrillation) (HCC)     PONV (postoperative nausea and vomiting)     no scopolamine patch    Recurrent UTI     Urethral caruncle     Urge incontinence of urine     Urge urinary incontinence     Urinary incontinence     unspec. Family History   Problem Relation Age of Onset    Cancer Mother         Gastric     Heart Disease Father        Current Outpatient Medications   Medication Sig Dispense Refill    methenamine hippurate (HIPREX) 1 gram tablet Take 1 g by mouth two (2) times daily (with meals).  ciprofloxacin HCl (CIPRO) 500 mg tablet Take 1 Tab by mouth two (2) times a day for 7 days.  14 Tab 0    diclofenac (VOLTAREN) 1 % gel Apply 2 grams three times per day 100 g 3    glucosamine sulfate 750 mg tab 750 mg.      fish oil-omega-3 fatty acids 340-1,000 mg capsule 2,000 mg.      B.infantis-B.ani-B.long-B.bifi (PROBIOTIC 4X) 10-15 mg TbEC Take  by mouth.  ascorbic acid (VITAMIN C) 1,000 mg tablet Take  by mouth.  ergocalciferol (VITAMIN D2) 50,000 unit capsule Take one cap by mouth twice a month 6 Cap 5    estradiol (ESTRACE) 0.01 % (0.1 mg/gram) vaginal cream Apply small amount to urethral  Opening daily 85 g 3    OTHER 2 Tabs two (2) times a day. Macular Protect Complete vitamin      omega-3 fatty acids-vitamin e (FISH OIL) 1,000 mg Cap Take 1 Cap by mouth two (2) times a day.  aspirin 81 mg tablet Take 81 mg by mouth.  magnesium oxide (MAG-OX) 400 mg tablet Take 400 mg by mouth daily.  folic acid 025 mcg tablet Take 400 mcg by mouth daily.  ASCORBIC ACID/VITAMIN E (CRANBERRY CONCENTRATE PO) Take  by mouth two (2) times a day.          Allergies   Allergen Reactions    Latex Anaphylaxis    Ampicillin Hives    Codeine Nausea and Vomiting    Doxycycline Hives     States can take with benadryl    Macrodantin [Nitrofurantoin Macrocrystalline] Hives    Other Medication Hives     Thallium dyes    Pcn [Penicillins] Hives     Other reaction(s): mild rash/itching    Sulfa (Sulfonamide Antibiotics) Hives     Other reaction(s): mild rash/itching    Tetracycline Hives    Scopolamine Other (comments)     Other reaction(s): cardiac dysrhythmia  Patient reports A-Fib arrythmia    Thallium-201 Rash       Past Surgical History:   Procedure Laterality Date    HX BLADDER SUSPENSION      HX CATARACT REMOVAL Bilateral     HX HYSTERECTOMY      HX OOPHORECTOMY Bilateral     TOTAL KNEE ARTHROPLASTY Right        Social History     Socioeconomic History    Marital status:      Spouse name: Not on file    Number of children: Not on file    Years of education: Not on file    Highest education level: Not on file   Occupational History    Not on file   Social Needs    Financial resource strain: Not on file   [place]in[place] insecurity:     Worry: Not on file     Inability: Not on file    Transportation needs:     Medical: Not on file     Non-medical: Not on file   Tobacco Use    Smoking status: Never Smoker    Smokeless tobacco: Never Used   Substance and Sexual Activity    Alcohol use: No    Drug use: No    Sexual activity: Never   Lifestyle    Physical activity:     Days per week: Not on file     Minutes per session: Not on file    Stress: Not on file   Relationships    Social connections:     Talks on phone: Not on file     Gets together: Not on file     Attends Sikh service: Not on file     Active member of club or organization: Not on file     Attends meetings of clubs or organizations: Not on file     Relationship status: Not on file    Intimate partner violence:     Fear of current or ex partner: Not on file     Emotionally abused: Not on file     Physically abused: Not on file     Forced sexual activity: Not on file   Other Topics Concern    Not on file   Social History Narrative    Not on file       Visit Vitals  /65   Pulse 63   Temp 98.1 °F (36.7 °C) (Oral)   Resp 17   Ht 5' 6\" (1.676 m)   Wt 135 lb 6.4 oz (61.4 kg)   SpO2 97%   BMI 21.85 kg/m²         PHYSICAL EXAMINATION:  GENERAL: Alert and oriented x3, in no acute distress, well-developed, well-nourished, afebrile. HEART: No JVD. EYES: No scleral icterus   NECK: No significant lymphadenopathy   LUNGS: No respiratory compromise or indrawing  ABDOMEN: Soft, non-tender, non-distended. HISTORY:  I had the pleasure of reviewing the patient today. She has had about an 8 week incidence of left shoulder pain, no trauma. It hurts her to roll over on it at night. It is non-radicular. No complaints of neck problems. She reminds me she is an active lady and it is slowing her down. Occasionally reaching for a coffee cup or putting her bra on can be uncomfortable. She denies fevers or chills. Otherwise she has been feeling well.      PHYSICAL EXAMINATION:  On the examination today, very pleasant lady. On forward elevation she is missing about 10 degrees of forward elevation. Abduction is to about 110 to 115 degrees. It is full on the right. Tam sign is positive. Crossed adduction sign through the [facility] joints is mildly tender as well and some mild tenderness at the [facility] joint. Good  strength. She is slim. No cyanosis, peripheral edema, or clubbing. Both feet are warm and well perfused. RADIOGRAPHS:  Review of the x-rays show about a type 2 hooked acromion and there are some degenerative changes at the [facility] joint. IMPRESSION:  My overall impression is impingement syndrome, bursitis. Her external rotators today were about 5-/5. I think we should try with an injection. PROCEDURE:   Under aseptic conditions, after informed written consent with timeout, the left shoulder intraarticular injection was well tolerated. PLAN:  She is going to return to see us in about 4 weeks' time to  the efficacy of the injection. We could consider an AC joint injection as well and if she is not happy with this, we could consider MRI as well for a partial cuff tear. Hopefully this will be efficacious for her. She had instant relief at this time with the injection. We will see her back in about 4 weeks. I wanted to again thank you for allowing me to share in her care and provide opinion and advice with regard to her management. CC:  Andrade Carney MD        Electronically signed by: Pramod Anthony MD        REVIEW OF SYSTEMS:      CON: negative for weight loss, fever  EYE: negative for double vision  ENT: negative for hoarseness  RS:   negative for Tb  GI:    negative for blood in stool  :  negative for blood in urine  Other systems reviewed and noted below.           Past Medical History:   Diagnosis Date    Abnormal weight gain     Arthritis     Atrial fibrillation (HCC)     Cardiac arrhythmia     Chronic low back pain without sciatica     Drowsiness     Feels cold     History of artificial joint     History of nephrolithiasis     Knee joint replacement status, right     Macrocytosis without anemia     Mitral valve disorder     MVP (mitral valve prolapse)     Osteopenia     PAF [facility]oxysmal atrial fibrillation) ([facility])     PONV (postoperative nausea and vomiting)     no scopolamine patch    Recurrent UTI     Urethral caruncle     Urge incontinence of urine     Urge urinary incontinence     Urinary incontinence     unspec. Family History   Problem Relation Age of Onset    Cancer Mother         Gastric     Heart Disease Father        Current Outpatient Medications   Medication Sig Dispense Refill    methenamine hippurate (HIPREX) 1 gram tablet Take 1 g by mouth two (2) times daily (with meals).  ciprofloxacin HCl (CIPRO) 500 mg tablet Take 1 Tab by mouth two (2) times a day for 7 days. 14 Tab 0    diclofenac (VOLTAREN) 1 % gel Apply 2 grams three times per day 100 g 3    glucosamine sulfate 750 mg tab 750 mg.      fish oil-omega-3 fatty acids 340-1,000 mg capsule 2,000 mg.      B.infantis-B.ani-B.long-B.bifi (PROBIOTIC 4X) 10-15 mg TbEC Take  by mouth.  ascorbic acid (VITAMIN C) 1,000 mg tablet Take  by mouth.  ergocalciferol (VITAMIN D2) 50,000 unit capsule Take one cap by mouth twice a month 6 Cap 5    estradiol (ESTRACE) 0.01 % (0.1 mg/gram) vaginal cream Apply small amount to urethral  Opening daily 85 g 3    OTHER 2 Tabs two (2) times a day. Macular Protect Complete vitamin      omega-3 fatty acids-vitamin e (FISH OIL) 1,000 mg Cap Take 1 Cap by mouth two (2) times a day.  aspirin 81 mg tablet Take 81 mg by mouth.  magnesium oxide (MAG-OX) 400 mg tablet Take 400 mg by mouth daily.  folic acid 574 mcg tablet Take 400 mcg by mouth daily.  ASCORBIC ACID/VITAMIN E (CRANBERRY CONCENTRATE PO) Take  by mouth two (2) times a day.          Allergies   Allergen Reactions    Latex Anaphylaxis    Ampicillin Hives    Codeine Nausea and Vomiting    Doxycycline Hives     States can take with benadryl    Macrodantin [Nitrofurantoin Macrocrystalline] Hives    Other Medication Hives     Thallium dyes    Pcn [Penicillins] Hives     Other reaction(s): mild rash/itching    Sulfa (Sulfonamide Antibiotics) Hives     Other reaction(s): mild rash/itching    Tetracycline Hives    Scopolamine Other (comments)     Other reaction(s): cardiac dysrhythmia  Patient reports A-Fib arrythmia    Thallium-201 Rash       Past Surgical History:   Procedure Laterality Date    HX BLADDER SUSPENSION      HX CATARACT REMOVAL Bilateral     HX HYSTERECTOMY      HX OOPHORECTOMY Bilateral     TOTAL KNEE ARTHROPLASTY Right        Social History     Socioeconomic History    Marital status:      Spouse name: Not on file    Number of children: Not on file    Years of education: Not on file    Highest education level: Not on file   Occupational History    Not on file   Social Needs    Financial resource strain: Not on file    Food insecurity:     Worry: Not on file     Inability: Not on file    Transportation needs:     Medical: Not on file     Non-medical: Not on file   Tobacco Use    Smoking status: Never Smoker    Smokeless tobacco: Never Used   Substance and Sexual Activity    Alcohol use: No    Drug use: No    Sexual activity: Never   Lifestyle    Physical activity:     Days per week: Not on file     Minutes per session: Not on file    Stress: Not on file   Relationships    Social connections:     Talks on phone: Not on file     Gets together: Not on file     Attends Buddhist service: Not on file     Active member of club or organization: Not on file     Attends meetings of clubs or organizations: Not on file     Relationship status: Not on file    Intimate partner violence:     Fear of current or ex partner: Not on file     Emotionally abused: Not on file Physically abused: Not on file     Forced sexual activity: Not on file   Other Topics Concern    Not on file   Social History Narrative    Not on file       Visit Vitals  /65   Pulse 63   Temp 98.1 °F (36.7 °C) (Oral)   Resp 17   Ht 5' 6\" (1.676 m)   Wt 135 lb 6.4 oz (61.4 kg)   SpO2 97%   BMI 21.85 kg/m²         PHYSICAL EXAMINATION:  GENERAL: Alert and oriented x3, in no acute distress, well-developed, well-nourished, afebrile. HEART: No JVD. EYES: No scleral icterus   NECK: No significant lymphadenopathy   LUNGS: No respiratory compromise or indrawing  ABDOMEN: Soft, non-tender, non-distended. Electronically signed by:  Eli Freedman MD

## 2019-08-06 ENCOUNTER — HOSPITAL ENCOUNTER (OUTPATIENT)
Dept: LAB | Age: 84
Discharge: HOME | End: 2019-08-06
Payer: MEDICARE

## 2019-08-06 ENCOUNTER — OFFICE VISIT (OUTPATIENT)
Dept: FAMILY MEDICINE CLINIC | Facility: CLINIC | Age: 84
End: 2019-08-06

## 2019-08-06 VITALS
DIASTOLIC BLOOD PRESSURE: 61 MMHG | HEART RATE: 66 BPM | OXYGEN SATURATION: 97 % | HEIGHT: 66 IN | TEMPERATURE: 96.4 F | BODY MASS INDEX: 21.21 KG/M2 | SYSTOLIC BLOOD PRESSURE: 133 MMHG | RESPIRATION RATE: 12 BRPM | WEIGHT: 132 LBS

## 2019-08-06 DIAGNOSIS — Z78.0 MENOPAUSE: ICD-10-CM

## 2019-08-06 DIAGNOSIS — Z00.00 MEDICARE ANNUAL WELLNESS VISIT, SUBSEQUENT: Primary | ICD-10-CM

## 2019-08-06 DIAGNOSIS — R53.82 CHRONIC FATIGUE: ICD-10-CM

## 2019-08-06 DIAGNOSIS — Z12.31 ENCOUNTER FOR SCREENING MAMMOGRAM FOR BREAST CANCER: ICD-10-CM

## 2019-08-06 DIAGNOSIS — Z13.31 DEPRESSION SCREEN: ICD-10-CM

## 2019-08-06 LAB
ALBUMIN SERPL-MCNC: 3.9 G/DL (ref 3.4–5)
ALBUMIN/GLOB SERPL: 1.3 {RATIO} (ref 0.8–1.7)
ALP SERPL-CCNC: 66 U/L (ref 45–117)
ALT SERPL-CCNC: 30 U/L (ref 13–56)
ANION GAP SERPL CALC-SCNC: 8 MMOL/L (ref 3–18)
AST SERPL-CCNC: 29 U/L (ref 10–38)
BASOPHILS # BLD: 0 K/UL (ref 0–0.1)
BASOPHILS NFR BLD: 0 % (ref 0–2)
BILIRUB SERPL-MCNC: 0.3 MG/DL (ref 0.2–1)
BUN SERPL-MCNC: 24 MG/DL (ref 7–18)
BUN/CREAT SERPL: 23 (ref 12–20)
CALCIUM SERPL-MCNC: 9.1 MG/DL (ref 8.5–10.1)
CHLORIDE SERPL-SCNC: 104 MMOL/L (ref 100–111)
CO2 SERPL-SCNC: 30 MMOL/L (ref 21–32)
CREAT SERPL-MCNC: 1.03 MG/DL (ref 0.6–1.3)
DIFFERENTIAL METHOD BLD: ABNORMAL
EOSINOPHIL # BLD: 0.1 K/UL (ref 0–0.4)
EOSINOPHIL NFR BLD: 2 % (ref 0–5)
ERYTHROCYTE [DISTWIDTH] IN BLOOD BY AUTOMATED COUNT: 13.7 % (ref 11.6–14.5)
GLOBULIN SER CALC-MCNC: 3 G/DL (ref 2–4)
GLUCOSE SERPL-MCNC: 72 MG/DL (ref 74–99)
HCT VFR BLD AUTO: 42.6 % (ref 35–45)
HGB BLD-MCNC: 13.4 G/DL (ref 12–16)
LYMPHOCYTES # BLD: 1.2 K/UL (ref 0.9–3.6)
LYMPHOCYTES NFR BLD: 29 % (ref 21–52)
MCH RBC QN AUTO: 31.2 PG (ref 24–34)
MCHC RBC AUTO-ENTMCNC: 31.5 G/DL (ref 31–37)
MCV RBC AUTO: 99.1 FL (ref 74–97)
MONOCYTES # BLD: 0.5 K/UL (ref 0.05–1.2)
MONOCYTES NFR BLD: 11 % (ref 3–10)
NEUTS SEG # BLD: 2.4 K/UL (ref 1.8–8)
NEUTS SEG NFR BLD: 58 % (ref 40–73)
PLATELET # BLD AUTO: 221 K/UL (ref 135–420)
PMV BLD AUTO: 11.4 FL (ref 9.2–11.8)
POTASSIUM SERPL-SCNC: 4 MMOL/L (ref 3.5–5.5)
PROT SERPL-MCNC: 6.9 G/DL (ref 6.4–8.2)
RBC # BLD AUTO: 4.3 M/UL (ref 4.2–5.3)
SODIUM SERPL-SCNC: 142 MMOL/L (ref 136–145)
WBC # BLD AUTO: 4.2 K/UL (ref 4.6–13.2)

## 2019-08-06 PROCEDURE — 80053 COMPREHEN METABOLIC PANEL: CPT

## 2019-08-06 PROCEDURE — 36415 COLL VENOUS BLD VENIPUNCTURE: CPT

## 2019-08-06 PROCEDURE — 83921 ORGANIC ACID SINGLE QUANT: CPT

## 2019-08-06 PROCEDURE — 85025 COMPLETE CBC W/AUTO DIFF WBC: CPT

## 2019-08-06 RX ORDER — CYANOCOBALAMIN 1000 UG/ML
1000 INJECTION, SOLUTION INTRAMUSCULAR; SUBCUTANEOUS ONCE
Qty: 3 VIAL | Refills: 3
Start: 2019-08-06 | End: 2019-08-06

## 2019-08-06 NOTE — PATIENT INSTRUCTIONS
Medicare Wellness Visit, Female     The best way to live healthy is to have a lifestyle where you eat a well-balanced diet, exercise regularly, limit alcohol use, and quit all forms of tobacco/nicotine, if applicable. Regular preventive services are another way to keep healthy. Preventive services (vaccines, screening tests, monitoring & exams) can help personalize your care plan, which helps you manage your own care. Screening tests can find health problems at the earliest stages, when they are easiest to treat. [facility] follows the current, evidence-based guidelines published by the [facility] ([facility]) when recommending preventive services for our patients. Because we follow these guidelines, sometimes recommendations change over time as research supports it. (For example, mammograms used to be recommended annually. Even though Medicare will still pay for an annual mammogram, the newer guidelines recommend a mammogram every two years for women of average risk.)  Of course, you and your doctor may decide to screen more often for some diseases, based on your risk and your health status. Preventive services for you include:  - Medicare offers their members a free annual wellness visit, which is time for you and your primary care provider to discuss and plan for your preventive service needs. Take advantage of this benefit every year!  -All adults over the age of 72 should receive the recommended pneumonia vaccines. Current USPSTF guidelines recommend a series of two vaccines for the best pneumonia protection.   -All adults should have a flu vaccine yearly and a tetanus vaccine every 10 years. All adults age 61 and older should receive a shingles vaccine once in their lifetime.    -A bone mass density test is recommended when a woman turns 65 to screen for osteoporosis. This test is only recommended one time, as a screening.  Some providers will use this same test as a disease monitoring tool if you already have osteoporosis. -All adults age 38-68 who are overweight should have a diabetes screening test once every three years.   -Other screening tests and preventive services for persons with diabetes include: an eye exam to screen for diabetic retinopathy, a kidney function test, a foot exam, and stricter control over your cholesterol.   -Cardiovascular screening for adults with routine risk involves an electrocardiogram (ECG) at intervals determined by your doctor.   -Colorectal cancer screenings should be done for adults age 54-65 with no increased risk factors for colorectal cancer. There are a number of acceptable methods of screening for this type of cancer. Each test has its own benefits and drawbacks. Discuss with your doctor what is most appropriate for you during your annual wellness visit. The different tests include: colonoscopy (considered the best screening method), a fecal occult blood test, a fecal DNA test, and sigmoidoscopy. -Breast cancer screenings are recommended every other year for women of normal risk, age 54-69.  -Cervical cancer screenings for women over age 72 are only recommended with certain risk factors.   -All adults born between [facility] should be screened once for Hepatitis C.      Here is a list of your current Health Maintenance items (your personalized list of preventive services) with a due date:  Health Maintenance Due   Topic Date Due    Shingles Vaccine (1 of 2) 10/27/1987    Glaucoma Screening   10/27/2002    Annual Well Visit  10/18/2018    Flu Vaccine  08/01/2019

## 2019-08-06 NOTE — PROGRESS NOTES
This is the Subsequent Medicare Annual Wellness Exam, performed 12 months or more after the Initial AWV or the last Subsequent AWV    I have reviewed the patient's medical history in detail and updated the computerized patient record. History   The patient is doing ok but she tires easily. The feeling of fatigue is becoming more prominent over time. The patient is post menopausal.  He is due for a DEXA scan and screening mammogram.  The patient is followed by Dr. Laurie Llanos. She is being followed for possible early macular degeneration      Past Medical History:   Diagnosis Date    Abnormal weight gain     Arthritis     Atrial fibrillation (HCC)     Cardiac arrhythmia     Chronic low back pain without sciatica     Drowsiness     Feels cold     History of artificial joint     History of nephrolithiasis     Knee joint replacement status, right     Macrocytosis without anemia     Mitral valve disorder     MVP (mitral valve prolapse)     Osteopenia     PAF (paroxysmal atrial fibrillation) (HCC)     PONV (postoperative nausea and vomiting)     no scopolamine patch    Recurrent UTI     Urethral caruncle     Urge incontinence of urine     Urge urinary incontinence     Urinary incontinence     unspec. Past Surgical History:   Procedure Laterality Date    HX BLADDER SUSPENSION      HX CATARACT REMOVAL Bilateral     HX HYSTERECTOMY      HX OOPHORECTOMY Bilateral     TOTAL KNEE ARTHROPLASTY Right      Current Outpatient Medications   Medication Sig Dispense Refill    methenamine hippurate (HIPREX) 1 gram tablet Take 1 g by mouth two (2) times daily (with meals).  diclofenac (VOLTAREN) 1 % gel Apply 2 grams three times per day 100 g 3    glucosamine sulfate 750 mg tab 750 mg.      fish oil-omega-3 fatty acids 340-1,000 mg capsule 2,000 mg.      B.infantis-B.ani-B.long-B.bifi (PROBIOTIC 4X) 10-15 mg TbEC Take  by mouth.  ascorbic acid (VITAMIN C) 1,000 mg tablet Take  by mouth.  ergocalciferol (VITAMIN D2) 50,000 unit capsule Take one cap by mouth twice a month 6 Cap 5    estradiol (ESTRACE) 0.01 % (0.1 mg/gram) vaginal cream Apply small amount to urethral  Opening daily 85 g 3    OTHER 2 Tabs two (2) times a day. Macular Protect Complete vitamin      omega-3 fatty acids-vitamin e (FISH OIL) 1,000 mg Cap Take 1 Cap by mouth two (2) times a day.  aspirin 81 mg tablet Take 81 mg by mouth.  magnesium oxide (MAG-OX) 400 mg tablet Take 400 mg by mouth daily.  folic acid 203 mcg tablet Take 400 mcg by mouth daily.  ASCORBIC ACID/VITAMIN E (CRANBERRY CONCENTRATE PO) Take  by mouth two (2) times a day.        Allergies   Allergen Reactions    Latex Anaphylaxis    Ampicillin Hives    Codeine Nausea and Vomiting    Doxycycline Hives     States can take with benadryl    Macrodantin [Nitrofurantoin Macrocrystalline] Hives    Other Medication Hives     Thallium dyes    Pcn [Penicillins] Hives     Other reaction(s): mild rash/itching    Sulfa (Sulfonamide Antibiotics) Hives     Other reaction(s): mild rash/itching    Tetracycline Hives    Scopolamine Other (comments)     Other reaction(s): cardiac dysrhythmia  Patient reports A-Fib arrythmia    Thallium-201 Rash     Family History   Problem Relation Age of Onset    Cancer Mother         Gastric     Heart Disease Father      Social History     Tobacco Use    Smoking status: Never Smoker    Smokeless tobacco: Never Used   Substance Use Topics    Alcohol use: No     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    Abnormal weight gain R63.5    Feels cold R68.89    Mitral valve disorder I05.9    Drowsiness R40.0    Chronic low back pain without sciatica M54.5, G89.29    Macrocytosis without anemia D75.89    History of artificial joint Z96.60       Depression Risk Factor Screening:     3 most recent PHQ Screens 8/6/2019   Little interest or pleasure in doing things Not at all Feeling down, depressed, irritable, or hopeless Not at all   Total Score PHQ 2 0     Alcohol Risk Factor Screening: You do not drink alcohol or very rarely. Functional Ability and Level of Safety:   Hearing Loss  Hearing is good. Activities of Daily Living  The home contains: no safety equipment. Patient does total self care    Fall Risk  Fall Risk Assessment, last 12 mths 8/6/2019   Able to walk? Yes   Fall in past 12 months? No       Abuse Screen  Patient is not abused    Cognitive Screening   Evaluation of Cognitive Function:  Has your family/caregiver stated any concerns about your memory: no  Normal    Patient Care Team   Patient Care Team:  Shaylee Lima MD as PCP - General (Internal Medicine)  Wellington Trujillo MD as Physician (Urology)    Assessment/Plan   Education and counseling provided:  Are appropriate based on today's review and evaluation    Diagnoses and all orders for this visit:    1. Medicare annual wellness visit, subsequent    2. Chronic fatigue  -     METHYLMALONIC ACID; Future  -     CBC WITH AUTOMATED DIFF; Future  -     METABOLIC PANEL, COMPREHENSIVE; Future    3. Depression screen    4. Encounter for screening mammogram for breast cancer  -     JUAN CARLOS MAMMO BI SCREENING INCL CAD; Future    5. Menopause  -     DEXA BONE DENSITY STUDY AXIAL; Future    Other orders  -     cyanocobalamin (VITAMIN B-12) 1,000 mcg/mL injection; 1 mL by IntraMUSCular route once for 1 dose. Health Maintenance Due   Topic Date Due    Shingrix Vaccine Age 49> (1 of 2) 10/27/1987    GLAUCOMA SCREENING Q2Y  10/27/2002    Influenza Age 5 to Adult  08/01/2019       Labs ordered  she was advised to continue her maintenance medications  DEXA scan ordered  Mammogram ordered  Further plans will be based on the test results      I asked Eddy Schroeder if she has any questions and I answered the questions.   Eddy Schroeder states that she understands the treatment plan and agrees with the treatment plan

## 2019-08-09 LAB
Lab: NORMAL
METHYLMALONATE SERPL-SCNC: 284 NMOL/L (ref 0–378)

## 2019-08-14 ENCOUNTER — TELEPHONE (OUTPATIENT)
Dept: FAMILY MEDICINE CLINIC | Facility: CLINIC | Age: 84
End: 2019-08-14

## 2019-08-14 NOTE — TELEPHONE ENCOUNTER
Patient called stating Dr. Lupe Devi was suppose to call in B12 for her. She also needs syringes and needle for this.   Please call in to [facility] on [address]

## 2019-08-16 RX ORDER — CYANOCOBALAMIN 1000 UG/ML
INJECTION, SOLUTION INTRAMUSCULAR; SUBCUTANEOUS
Qty: 3 VIAL | Refills: 3
Start: 2019-08-16 | End: 2019-08-19 | Stop reason: SDUPTHER

## 2019-08-19 RX ORDER — CYANOCOBALAMIN 1000 UG/ML
INJECTION, SOLUTION INTRAMUSCULAR; SUBCUTANEOUS
Qty: 3 VIAL | Refills: 3
Start: 2019-08-19 | End: 2020-04-21 | Stop reason: SDUPTHER

## 2019-08-22 ENCOUNTER — DOCUMENTATION ONLY (OUTPATIENT)
Dept: FAMILY MEDICINE CLINIC | Facility: CLINIC | Age: 84
End: 2019-08-22

## 2019-08-22 NOTE — PROGRESS NOTES
Per Dr. Brannon Cashing call in the patient Rx for the Vit B12 injection. Medication was phoned into the [facility].

## 2019-08-30 ENCOUNTER — HOSPITAL ENCOUNTER (OUTPATIENT)
Dept: LAB | Age: 84
Discharge: HOME | End: 2019-08-30
Payer: MEDICARE

## 2019-08-30 ENCOUNTER — LAB ONLY (OUTPATIENT)
Dept: FAMILY MEDICINE CLINIC | Facility: CLINIC | Age: 84
End: 2019-08-30

## 2019-08-30 DIAGNOSIS — R30.0 DYSURIA: Primary | ICD-10-CM

## 2019-08-30 DIAGNOSIS — R30.0 DYSURIA: ICD-10-CM

## 2019-08-30 LAB
BILIRUB UR QL STRIP: NEGATIVE
GLUCOSE UR-MCNC: NEGATIVE MG/DL
KETONES P FAST UR STRIP-MCNC: NEGATIVE MG/DL
PH UR STRIP: 6.5 [PH] (ref 4.6–8)
PROT UR QL STRIP: NORMAL
SP GR UR STRIP: 1.01 (ref 1–1.03)
UA UROBILINOGEN AMB POC: NORMAL (ref 0.2–1)
URINALYSIS CLARITY POC: NORMAL
URINALYSIS COLOR POC: NORMAL
URINE BLOOD POC: NORMAL
URINE LEUKOCYTES POC: NORMAL
URINE NITRITES POC: NEGATIVE

## 2019-08-30 PROCEDURE — 87086 URINE CULTURE/COLONY COUNT: CPT

## 2019-08-30 RX ORDER — CIPROFLOXACIN 500 MG/1
500 TABLET ORAL 2 TIMES DAILY
Qty: 20 TAB | Refills: 0 | Status: SHIPPED | OUTPATIENT
Start: 2019-08-30 | End: 2019-09-09

## 2019-08-30 NOTE — PROGRESS NOTES
Per Dr. Coronel Inch verbal order read back send to the pharmacy Cipro 500 mg  # 20 Take 1 po BID for 10 days. . Order placed and sent to the pharmacy.

## 2019-09-01 LAB
BACTERIA SPEC CULT: ABNORMAL
SERVICE CMNT-IMP: ABNORMAL

## 2019-12-10 ENCOUNTER — OFFICE VISIT (OUTPATIENT)
Dept: FAMILY MEDICINE CLINIC | Facility: CLINIC | Age: 84
End: 2019-12-10

## 2019-12-10 ENCOUNTER — HOSPITAL ENCOUNTER (OUTPATIENT)
Dept: LAB | Age: 84
Discharge: HOME | End: 2019-12-10
Payer: MEDICARE

## 2019-12-10 VITALS
DIASTOLIC BLOOD PRESSURE: 68 MMHG | SYSTOLIC BLOOD PRESSURE: 120 MMHG | HEIGHT: 66 IN | RESPIRATION RATE: 15 BRPM | BODY MASS INDEX: 21.21 KG/M2 | TEMPERATURE: 97.7 F | WEIGHT: 132 LBS | HEART RATE: 66 BPM | OXYGEN SATURATION: 97 %

## 2019-12-10 DIAGNOSIS — N39.0 RECURRENT UTI: Primary | ICD-10-CM

## 2019-12-10 DIAGNOSIS — N39.0 RECURRENT UTI: ICD-10-CM

## 2019-12-10 PROCEDURE — 87086 URINE CULTURE/COLONY COUNT: CPT

## 2019-12-10 NOTE — PROGRESS NOTES
The patient presents to the office today with the chief complaint of recurrent UTIs    HPI    The patient has recurrent UTIs. She is on Methenamine as a preventative. The patient has mild dysuria. Review of Systems   Respiratory: Negative for shortness of breath. Cardiovascular: Negative for chest pain and leg swelling. Genitourinary: Positive for dysuria (Mild). Allergies   Allergen Reactions    Latex Anaphylaxis    Ampicillin Hives    Codeine Nausea and Vomiting    Doxycycline Hives     States can take with benadryl    Macrodantin [Nitrofurantoin Macrocrystalline] Hives    Other Medication Hives     Thallium dyes    Pcn [Penicillins] Hives     Other reaction(s): mild rash/itching    Sulfa (Sulfonamide Antibiotics) Hives     Other reaction(s): mild rash/itching    Tetracycline Hives    Formaldehyde Rash    Macrobid [Nitrofurantoin Monohyd/M-Cryst] Rash    Scopolamine Other (comments)     Other reaction(s): cardiac dysrhythmia  Patient reports A-Fib arrythmia    Thallium-201 Rash       Current Outpatient Medications   Medication Sig Dispense Refill    cyanocobalamin (VITAMIN B-12) 1,000 mcg/mL injection Inject 1 ml IM monthly 3 Vial 3    Syringe with Needle, Safety 3 mL 25 gauge x 5/8\" syrg Use with B12 15 Pen Needle 1    methenamine hippurate (HIPREX) 1 gram tablet Take 1 g by mouth two (2) times daily (with meals).  diclofenac (VOLTAREN) 1 % gel Apply 2 grams three times per day 100 g 3    glucosamine sulfate 750 mg tab 750 mg.      fish oil-omega-3 fatty acids 340-1,000 mg capsule 2,000 mg.      B.infantis-B.ani-B.long-B.bifi (PROBIOTIC 4X) 10-15 mg TbEC Take  by mouth.  ascorbic acid (VITAMIN C) 1,000 mg tablet Take  by mouth.       ergocalciferol (VITAMIN D2) 50,000 unit capsule Take one cap by mouth twice a month 6 Cap 5    estradiol (ESTRACE) 0.01 % (0.1 mg/gram) vaginal cream Apply small amount to urethral  Opening daily 85 g 3    OTHER 2 Tabs two (2) times a day. Macular Protect Complete vitamin      omega-3 fatty acids-vitamin e (FISH OIL) 1,000 mg Cap Take 1 Cap by mouth two (2) times a day.  aspirin 81 mg tablet Take 81 mg by mouth.  magnesium oxide (MAG-OX) 400 mg tablet Take 400 mg by mouth daily.  folic acid 970 mcg tablet Take 400 mcg by mouth daily.  ASCORBIC ACID/VITAMIN E (CRANBERRY CONCENTRATE PO) Take  by mouth two (2) times a day. Past Medical History:   Diagnosis Date    Abnormal weight gain     Arthritis     Atrial fibrillation (HCC)     Cardiac arrhythmia     Chronic low back pain without sciatica     Drowsiness     Feels cold     History of artificial joint     History of nephrolithiasis     Knee joint replacement status, right     Macrocytosis without anemia     Mitral valve disorder     MVP (mitral valve prolapse)     Osteopenia     [facility]ox[facility])     PONV (postoperative nausea and vomiting)     no scopolamine patch    Recurrent UTI     Urethral caruncle     Urge incontinence of urine     Urge urinary incontinence     Urinary incontinence     unspec. Past Surgical History:   Procedure Laterality Date    HX BLADDER SUSPENSION      HX CATARACT REMOVAL Bilateral     HX HYSTERECTOMY      HX OOPHORECTOMY Bilateral     TOTAL KNEE ARTHROPLASTY Right        Social History     Socioeconomic History    Marital status:      Spouse name: Not on file    Number of children: Not on file    Years of education: Not on file    Highest education level: Not on file   Occupational History    Not on file   Social Needs    Financial resource strain: Not on file    Food insecurity:     Worry: Not on file     Inability: Not on file    Transportation needs:     Medical: Not on file     Non-medical: Not on file   Tobacco Use    Smoking status: Never Smoker    Smokeless tobacco: Never Used   Substance and Sexual Activity    Alcohol use: No    Drug use:  No  Sexual activity: Never   Lifestyle    Physical activity:     Days per week: Not on file     Minutes per session: Not on file    Stress: Not on file   Relationships    Social connections:     Talks on phone: Not on file     Gets together: Not on file     Attends [employer] service: Not on file     Active member of club or organization: Not on file     Attends meetings of clubs or organizations: Not on file     Relationship status: Not on file    Intimate partner violence:     Fear of current or ex partner: Not on file     Emotionally abused: Not on file     Physically abused: Not on file     Forced sexual activity: Not on file   Other Topics Concern    Not on file   Social History Narrative    Not on file       Patient does not have an advanced directive on file    Visit Vitals  /68 (BP 1 Location: Left arm, BP Patient Position: Sitting)   Pulse 66   Temp 97.7 °F (36.5 °C) (Tympanic)   Resp 15   Ht 5' 6\" (1.676 m)   Wt 132 lb (59.9 kg)   SpO2 97%   BMI 21.31 kg/m²       Physical Exam  No Cervical Lymphadenopathy  No Supraclavicular Lymphadenopathy  Thyroid is Normal  Lungs are normal to percussion. Clear to auscultation   Heart:  S1 S2 are normal, No gallops, No murmurs  No Carotid Bruits  Abdomen:  Normal Bowel Sounds. No tenderness. No masses. No Hepatomegaly or Splenomegaly  LE:  Strong Pedal Pulses. No Edema      BMI:  [facility] Outpatient Visit on 12/10/2019   Component Date Value Ref Range Status    Special Requests: 12/10/2019 NO SPECIAL REQUESTS    Final    Culture result: 12/10/2019     Final                    Value:[id number]  COLONIES/mL  MIXED GRAM POSITIVE MIMA, PROBABLE SKIN/GENITAL CONTAMINATION. Sebas Clemente Results for orders placed or performed during the hospital encounter of 12/10/19   CULTURE, URINE   Result Value Ref Range    Special Requests: NO SPECIAL REQUESTS      Culture result:        [id number]  COLONIES/mL  MIXED GRAM POSITIVE MIMA, PROBABLE SKIN/GENITAL CONTAMINATION. Assessment / Plan      ICD-10-CM ICD-9-CM    1. Recurrent UTI N39.0 599.0 CULTURE, URINE       Urine for culture  she was advised to continue her maintenance medications      Follow-up and Dispositions    · Return in about 3 months (around 3/10/2020). I asked Staci Vora if she has any questions and I answered the questions. Staci Vora states that she understands the treatment plan and agrees with the treatment plan          THIS DOCUMENT [facility].   IT MAY CONTAIN TRANSCRIPTION ERRORS

## 2019-12-10 NOTE — PROGRESS NOTES
Farnaz Heard presents today for   Chief Complaint   Patient presents with    Vladimir Woman       Farnaz Heard preferred language for health care discussion is english. Is someone accompanying this pt? no    Is the patient using any DME equipment during [address]? no    Depression Screening:  3 most recent PHQ Screens 8/6/2019   Little interest or pleasure in doing things Not at all   Feeling down, depressed, irritable, or hopeless Not at all   Total Score PHQ 2 0       Learning Assessment:  Learning Assessment 7/24/2018   PRIMARY LEARNER Patient   HIGHEST LEVEL OF EDUCATION - PRIMARY LEARNER  SOME COLLEGE   BARRIERS PRIMARY LEARNER NONE   CO-LEARNER CAREGIVER No   PRIMARY LANGUAGE ENGLISH   LEARNER PREFERENCE PRIMARY LISTENING   ANSWERED BY patient   RELATIONSHIP SELF       Abuse Screening:  Abuse Screening Questionnaire 7/24/2018   Do you ever feel afraid of your partner? N   Are you in a relationship with someone who physically or mentally threatens you? N   Is it safe for you to go home? Y       Health Maintenance reviewed and discussed and ordered per Provider. Health Maintenance Due   Topic Date Due    Shingrix Vaccine Age 49> (1 of 2) 10/27/1987    GLAUCOMA SCREENING Q2Y  10/27/2002    Influenza Age 9 to Adult  08/01/2019   . Farnaz [place] is updated on all     Pt currently taking Antiplatelet therapy? no    Coordination of Care:  1. Have you been to the ER, urgent care clinic since your last visit? no  Hospitalized since your last visit? no    2. Have you seen or consulted any other health care providers outside of the [address] [place] [address] since your last visit? no Include any pap smears or colon screening.  no

## 2019-12-12 LAB
BACTERIA SPEC CULT: NORMAL
SERVICE CMNT-IMP: NORMAL

## 2019-12-13 ENCOUNTER — TELEPHONE (OUTPATIENT)
Dept: FAMILY MEDICINE CLINIC | Facility: CLINIC | Age: 84
End: 2019-12-13

## 2019-12-20 RX ORDER — CIPROFLOXACIN 250 MG/1
TABLET, FILM COATED ORAL
Qty: 14 TAB | Refills: 1 | Status: SHIPPED | OUTPATIENT
Start: 2019-12-20 | End: 2020-02-28

## 2020-02-28 ENCOUNTER — HOSPITAL ENCOUNTER (OUTPATIENT)
Dept: LAB | Age: 85
Discharge: HOME | End: 2020-02-28
Payer: MEDICARE

## 2020-02-28 ENCOUNTER — OFFICE VISIT (OUTPATIENT)
Dept: FAMILY MEDICINE CLINIC | Age: 85
End: 2020-02-28

## 2020-02-28 VITALS
BODY MASS INDEX: 21.74 KG/M2 | RESPIRATION RATE: 10 BRPM | HEIGHT: 65 IN | SYSTOLIC BLOOD PRESSURE: 124 MMHG | HEART RATE: 66 BPM | DIASTOLIC BLOOD PRESSURE: 72 MMHG | TEMPERATURE: 97.5 F | WEIGHT: 130.5 LBS | OXYGEN SATURATION: 98 %

## 2020-02-28 DIAGNOSIS — R68.89 OTHER GENERAL SYMPTOMS AND SIGNS: ICD-10-CM

## 2020-02-28 DIAGNOSIS — Z86.79 HISTORY OF ATRIAL FIBRILLATION: ICD-10-CM

## 2020-02-28 DIAGNOSIS — D75.89 MACROCYTOSIS WITHOUT ANEMIA: Primary | ICD-10-CM

## 2020-02-28 DIAGNOSIS — D75.89 MACROCYTOSIS WITHOUT ANEMIA: ICD-10-CM

## 2020-02-28 LAB
ALBUMIN SERPL-MCNC: 4 G/DL (ref 3.4–5)
ALBUMIN/GLOB SERPL: 1.2 {RATIO} (ref 0.8–1.7)
ALP SERPL-CCNC: 64 U/L (ref 45–117)
ALT SERPL-CCNC: 29 U/L (ref 13–56)
ANION GAP SERPL CALC-SCNC: 4 MMOL/L (ref 3–18)
AST SERPL-CCNC: 32 U/L (ref 10–38)
BASOPHILS # BLD: 0 K/UL (ref 0–0.1)
BASOPHILS NFR BLD: 1 % (ref 0–2)
BILIRUB SERPL-MCNC: 0.4 MG/DL (ref 0.2–1)
BUN SERPL-MCNC: 22 MG/DL (ref 7–18)
BUN/CREAT SERPL: 23 (ref 12–20)
CALCIUM SERPL-MCNC: 9.2 MG/DL (ref 8.5–10.1)
CHLORIDE SERPL-SCNC: 107 MMOL/L (ref 100–111)
CO2 SERPL-SCNC: 30 MMOL/L (ref 21–32)
CREAT SERPL-MCNC: 0.96 MG/DL (ref 0.6–1.3)
DIFFERENTIAL METHOD BLD: ABNORMAL
EOSINOPHIL # BLD: 0.1 K/UL (ref 0–0.4)
EOSINOPHIL NFR BLD: 2 % (ref 0–5)
ERYTHROCYTE [DISTWIDTH] IN BLOOD BY AUTOMATED COUNT: 13.3 % (ref 11.6–14.5)
FOLATE SERPL-MCNC: >20 NG/ML (ref 3.1–17.5)
GLOBULIN SER CALC-MCNC: 3.4 G/DL (ref 2–4)
GLUCOSE SERPL-MCNC: 86 MG/DL (ref 74–99)
HCT VFR BLD AUTO: 41.9 % (ref 35–45)
HGB BLD-MCNC: 13.8 G/DL (ref 12–16)
LYMPHOCYTES # BLD: 1.1 K/UL (ref 0.9–3.6)
LYMPHOCYTES NFR BLD: 26 % (ref 21–52)
MCH RBC QN AUTO: 31.4 PG (ref 24–34)
MCHC RBC AUTO-ENTMCNC: 32.9 G/DL (ref 31–37)
MCV RBC AUTO: 95.2 FL (ref 74–97)
MONOCYTES # BLD: 0.5 K/UL (ref 0.05–1.2)
MONOCYTES NFR BLD: 11 % (ref 3–10)
NEUTS SEG # BLD: 2.6 K/UL (ref 1.8–8)
NEUTS SEG NFR BLD: 60 % (ref 40–73)
PLATELET # BLD AUTO: 190 K/UL (ref 135–420)
PMV BLD AUTO: 11.8 FL (ref 9.2–11.8)
POTASSIUM SERPL-SCNC: 5.3 MMOL/L (ref 3.5–5.5)
PROT SERPL-MCNC: 7.4 G/DL (ref 6.4–8.2)
RBC # BLD AUTO: 4.4 M/UL (ref 4.2–5.3)
SODIUM SERPL-SCNC: 141 MMOL/L (ref 136–145)
VIT B12 SERPL-MCNC: >2000 PG/ML (ref 211–911)
WBC # BLD AUTO: 4.3 K/UL (ref 4.6–13.2)

## 2020-02-28 PROCEDURE — 80053 COMPREHEN METABOLIC PANEL: CPT

## 2020-02-28 PROCEDURE — 82607 VITAMIN B-12: CPT

## 2020-02-28 PROCEDURE — 85025 COMPLETE CBC W/AUTO DIFF WBC: CPT

## 2020-02-28 RX ORDER — CEPHALEXIN 250 MG/1
250 CAPSULE ORAL DAILY
COMMUNITY
End: 2021-05-25

## 2020-02-28 NOTE — PROGRESS NOTES
Krista Penaloza is a 82y.o. year old female who is a new patient to me today. She was previous followed by Dr. Cristobal Payton. Assessment & Plan:   Diagnoses and all orders for this visit:    1. Macrocytosis without anemia  -     CBC WITH AUTOMATED DIFF; Future  -     VITAMIN B12 & FOLATE; Future  -     METABOLIC PANEL, COMPREHENSIVE; Future    2. Other general symptoms and signs   -     VITAMIN B12 & FOLATE; Future    3. History of atrial fibrillation  -     REFERRAL TO CARDIOLOGY      history of stated recommendation for pacemaker and anticoagulation suggests to me that cardiac workup found more than a single episode of afib as she purports. Need clarification and recommendations in any case, and certainly for preoperative planning and risk assessment    Macrocytosis unclear etiology. WBC count slightly low at last assessment. In absence of ETOH, if B12 normal, low threshold referral    Follow-up and Dispositions    · Return in about 2 weeks (around 3/13/2020) for lab follow up. Subjective: Krista Penaloza was seen today for Establish Care    \"looking for a new doctor\"     Desires pre op as soon as possible. Recurrent UTIs last year. Urinary incontinence. Seeing Dr. Russ Kent in [place] currently on prophylactic antibiotics pending an unnamed surgery that is not yet scheduled. Reports not available for review at the time of our visit. Joint chart review:    Reviewing past hx: nephrolithiasis listed \"I've never had kidney stones. I know who put that there. No tests. Just assumption. \" seen by Dr. Beatrice Jordan. Reports not available for review at the time of our visit. \"Congenital\" mitral valve disorder. Asymptomatic. I see multiple references to atrial fibrillation including PAF in her med hx. She states was single episode triggered by scopolamine patch application during cataract extraction at age 70. Used to see Dr. Aldair Parikh \"I'll be very upfront with you, I lost confidence.  He came in with a chart and said, 'I'm going to put a pacemaker on you.' Why would you put me on a blood thinner? \" Wore a monitor. Had a Doppler (sounds like echo). Currently asymptomatic    Macrocytosis - unclear etiology. B12 injections at home x few months \"I feel better since I've been taking them. \"    No results found for: B12LT, FOL, RBCF  Lab Results   Component Value Date/Time    WBC 4.2 (L) 08/06/2019 09:47 AM    HGB 13.4 08/06/2019 09:47 AM    HCT 42.6 08/06/2019 09:47 AM    PLATELET 704 79/32/2254 09:47 AM    MCV 99.1 (H) 08/06/2019 09:47 AM         Takes folic acid for leg cramps: helpful  Fish oil: \"helps with my eyes\" also had been advised to do so by Dr. Romana Pimple ([facility]) at age 70    Estradiol cream: had been prescribed by Dr. Daisha Jackson (ob/gyn)     Denies ETOH consumption    Patient Care Team:  Delfin Wynne MD as PCP - General (Family Practice)  Liana Bear NP as PCP - [facility] Empaneled Provider  Hector Vinson MD (Obstetrics & Gynecology)    Allergies   Allergen Reactions    Latex Anaphylaxis    Ampicillin Hives    Codeine Nausea and Vomiting    Doxycycline Hives     States can take with benadryl    Macrodantin [Nitrofurantoin Macrocrystalline] Hives    Other Medication Hives     Thallium dyes    Pcn [Penicillins] Hives     Other reaction(s): mild rash/itching    Sulfa (Sulfonamide Antibiotics) Hives     Other reaction(s): mild rash/itching    Tetracycline Hives    Formaldehyde Rash    Macrobid [Nitrofurantoin Monohyd/M-Cryst] Rash    Scopolamine Other (comments)     Other reaction(s): cardiac dysrhythmia  Patient reports A-Fib arrythmia    Thallium-201 Rash       Prior to Admission medications    Medication Sig Start Date End Date Taking? Authorizing Provider   cephALEXin (KEFLEX) 250 mg capsule Take 250 mg by mouth daily. Yes Provider, Historical   calcium-cholecalciferol, d3, (CALCIUM 600 + D) 600-125 mg-unit tab Take  by mouth daily.    Yes Provider, Historical   cyanocobalamin (VITAMIN B-12) 1,000 mcg/mL injection Inject 1 ml IM monthly 8/19/19  Yes Richelle Jaimes MD   glucosamine sulfate 750 mg tab 750 mg. 2/6/11  Yes Provider, Historical   fish oil-omega-3 fatty acids 340-1,000 mg capsule 2,000 mg. 2/7/11  Yes Provider, Historical   B.infantis-B.ani-B.long-B.bifi (PROBIOTIC 4X) 10-15 mg TbEC Take  by mouth. Yes Provider, Historical   ascorbic acid (VITAMIN C) 1,000 mg tablet Take  by mouth. Yes Provider, Historical   estradiol (ESTRACE) 0.01 % (0.1 mg/gram) vaginal cream Apply small amount to urethral  Opening daily 8/25/14  Yes Katharine Rivera MD   OTHER 2 Tabs two (2) times a day. Macular Protect Complete vitamin   Yes Provider, Historical   aspirin 81 mg tablet Take 81 mg by mouth. Yes Provider, Historical   folic acid 450 mcg tablet Take 400 mcg by mouth daily. Yes Provider, Historical   ASCORBIC ACID/VITAMIN E (CRANBERRY CONCENTRATE PO) Take  by mouth two (2) times a day. Yes Provider, Historical   ciprofloxacin HCl (CIPRO) 250 mg tablet 1 tab twice per day  Patient taking differently: 250 mg daily. 1 tab twice per day 12/20/19 2/28/20  Richelle Jaimes MD   Syringe with Needle, Safety 3 mL 25 gauge x 5/8\" syrg Use with B12 8/16/19   Richelle Jaimes MD   methenamine hippurate (HIPREX) 1 gram tablet Take 1 g by mouth two (2) times daily (with meals). 2/28/20  Provider, Historical   diclofenac (VOLTAREN) 1 % gel Apply 2 grams three times per day 4/9/19 2/28/20  Richelle Jaimes MD   ergocalciferol (VITAMIN D2) 50,000 unit capsule Take one cap by mouth twice a month 12/11/15 2/28/20  Richelle Jaimes MD   omega-3 fatty acids-vitamin e (FISH OIL) 1,000 mg Cap Take 1 Cap by mouth two (2) times a day. 2/28/20  Provider, Historical   magnesium oxide (MAG-OX) 400 mg tablet Take 400 mg by mouth daily.     2/28/20  Provider, Historical       Patient Active Problem List    Diagnosis    Drowsiness    Chronic low back pain without sciatica    Macrocytosis without anemia    Abnormal weight gain    Feels cold    Mitral valve disorder    Urethral caruncle    History of artificial joint       Past Medical History:   Diagnosis Date    Abnormal weight gain     Arthritis     Atrial fibrillation (HCC)     Cardiac arrhythmia     Chronic low back pain without sciatica     Drowsiness     Feels cold     History of artificial joint     History of nephrolithiasis     Knee joint replacement status, right     Macrocytosis without anemia     Mitral valve disorder     MVP (mitral valve prolapse)     Osteopenia     PAF (paroxysmal atrial fibrillation) (HCC)     PONV (postoperative nausea and vomiting)     no scopolamine patch    Recurrent UTI     Urethral caruncle     Urge incontinence of urine     Urge urinary incontinence     Urinary incontinence     unspec.         Past Surgical History:   Procedure Laterality Date    HX BLADDER SUSPENSION      HX CATARACT REMOVAL Bilateral     HX HYSTERECTOMY      HX OOPHORECTOMY Bilateral     TOTAL KNEE ARTHROPLASTY Right        Family History   Problem Relation Age of Onset    Cancer Mother         Gastric     Heart Disease Father        Social History     Socioeconomic History    Marital status:      Spouse name: Not on file    Number of children: Not on file    Years of education: Not on file    Highest education level: Not on file   Occupational History    Not on file   Social Needs    Financial resource strain: Not on file    Food insecurity:     Worry: Not on file     Inability: Not on file    Transportation needs:     Medical: Not on file     Non-medical: Not on file   Tobacco Use    Smoking status: Never Smoker    Smokeless tobacco: Never Used   Substance and Sexual Activity    Alcohol use: No    Drug use: No    Sexual activity: Never   Lifestyle    Physical activity:     Days per week: Not on file     Minutes per session: Not on file    Stress: Not on file   Relationships    Social connections:     Talks on phone: Not on file     Gets together: Not on file     Attends Baptism service: Not on file     Active member of club or organization: Not on file     Attends meetings of clubs or organizations: Not on file     Relationship status: Not on file    Intimate partner violence:     Fear of current or ex partner: Not on file     Emotionally abused: Not on file     Physically abused: Not on file     Forced sexual activity: Not on file   Other Topics Concern    Not on file   Social History Narrative    Not on file            Review of Systems   Constitutional: Negative for fever. Cardiovascular: Negative for palpitations. Objective:     Visit Vitals  /72 (BP 1 Location: Left arm, BP Patient Position: Sitting)   Pulse 66   Temp 97.5 °F (36.4 °C) (Oral)   Resp 10   Ht 5' 4.5\" (1.638 m)   Wt 130 lb 8 oz (59.2 kg)   SpO2 98%   BMI 22.05 kg/m²      Physical Exam  Constitutional:       General: She is not in acute distress. Appearance: She is well-developed. She is not diaphoretic. HENT:      Head: Normocephalic and atraumatic. Mouth/Throat:      Pharynx: Oropharynx is clear. Cardiovascular:      Rate and Rhythm: Normal rate and regular rhythm. Heart sounds: Normal heart sounds. No murmur. No friction rub. No gallop. Pulmonary:      Effort: Pulmonary effort is normal. No respiratory distress. Breath sounds: Normal breath sounds. No wheezing, rhonchi or rales. Abdominal:      General: Abdomen is flat. There is no distension. Palpations: Abdomen is soft. There is no mass. Tenderness: There is no abdominal tenderness. Musculoskeletal:      Right lower leg: No edema. Left lower leg: No edema. Skin:     General: Skin is warm and dry. Nails: There is no clubbing. Neurological:      Mental Status: She is alert and oriented to person, place, and time. Psychiatric:         Behavior: Behavior normal.         Thought Content:  Thought content normal. Judgment: Judgment normal.      Comments: irritable                        Disclaimer: The patient understands our medical plan. Alternatives have been explained and offered. The risks, benefits and significant side effects of all medications have been reviewed. Anticipated time course and progression of condition reviewed. All questions have been addressed. She is encouraged to employ the information provided in the after visit summary, which was reviewed. Where applicable, she is instructed to call the clinic if she has not been notified either by phone or through [address] with the results of her tests or with an appointment plan for any referrals within 1 week(s). No news is not good news; it's no news. The patient  is to call if her condition worsens or fails to improve or if significant side effects are experienced. Aspects of this note may have been generated using voice recognition software. Despite editing, there may be unrecognized errors. Over 50% of the 37 minutes face to face with Miles May consisted of counseling and/or discussing treatment plans.      Jorgito Stein MD

## 2020-02-28 NOTE — PROGRESS NOTES
New patient here states she needs to establish care and also would like to have pre-op testing ordered. She has not been scheduled for her surgery yet was told she needs to be cleared by her PCM. Patient says she will be having bladder surgery at [address] [place] [address] out patient. Care team discussed/updated as well as pharmacy.     Health Maintenance Due   Topic Date Due    Shingrix Vaccine Age 49> (1 of 2) 10/27/1987    GLAUCOMA SCREENING Q2Y  10/27/2002    Influenza Age 9 to Adult  08/01/2019

## 2020-03-12 PROBLEM — N39.0 RECURRENT UTI: Status: ACTIVE | Noted: 2020-03-12

## 2020-03-12 PROBLEM — N39.46 MIXED STRESS AND URGE URINARY INCONTINENCE: Status: ACTIVE | Noted: 2020-03-12

## 2020-04-21 RX ORDER — CYANOCOBALAMIN 1000 UG/ML
INJECTION, SOLUTION INTRAMUSCULAR; SUBCUTANEOUS
Qty: 3 VIAL | Refills: 3
Start: 2020-04-21 | End: 2020-07-06 | Stop reason: SDUPTHER

## 2020-04-21 NOTE — TELEPHONE ENCOUNTER
Patient called the office today asking if she can get refills on her B12 injection, she did have a follow up with Dr. Jeremias Sanders in March but this was cancelled due to COVID-19. She says she is not able to do a VV does not have an smart phone or device. She says if she needs a visit with Dr. Jeremias Sanders she would prefer to do a telephone consult. She quan like her medication sent to [facility] mail order pharmacy. Medication has been pended pleas review and sign if appropriate.

## 2020-06-19 ENCOUNTER — OFFICE VISIT (OUTPATIENT)
Dept: CARDIOLOGY CLINIC | Age: 85
End: 2020-06-19

## 2020-06-19 VITALS
WEIGHT: 130 LBS | BODY MASS INDEX: 21.66 KG/M2 | HEART RATE: 69 BPM | OXYGEN SATURATION: 97 % | DIASTOLIC BLOOD PRESSURE: 72 MMHG | SYSTOLIC BLOOD PRESSURE: 120 MMHG | HEIGHT: 65 IN

## 2020-06-19 DIAGNOSIS — Z01.818 PRE-OP EVALUATION: Primary | ICD-10-CM

## 2020-06-19 DIAGNOSIS — I05.9 MITRAL VALVE DISORDER: ICD-10-CM

## 2020-06-19 NOTE — LETTER
6/19/2020 4:21 PM 
 
Ms. Dawna Shaikh [place] [address] [id number] Dawna Shaikh was seen in our office on 6/19/2020 for cardiac evaluation. From a cardiac standpoint she cleared for bladder surgery. Please feel free to contact our office if you have any questions regarding this patient. Sincerely, Beatriz Sanchez, DO

## 2020-06-19 NOTE — PROGRESS NOTES
Lornekailee Paolojulito presents today for   Chief Complaint   Patient presents with    New Patient     referred by PCP for surgical clearance - having bladder surgery, date TBD, at [facility] with Dr. Tianna Winters - no cardiac complaints        Larwance Mcburney preferred language for health care discussion is english/other. Is someone accompanying this pt? no    Is the patient using any DME equipment during [address]? no    Depression Screening:  3 most recent PHQ Screens 6/19/2020   Little interest or pleasure in doing things Not at all   Feeling down, depressed, irritable, or hopeless Not at all   Total Score PHQ 2 0       Learning Assessment:  Learning Assessment 7/24/2018   PRIMARY LEARNER Patient   HIGHEST LEVEL OF EDUCATION - PRIMARY LEARNER  SOME COLLEGE   BARRIERS PRIMARY LEARNER NONE   CO-LEARNER CAREGIVER No   PRIMARY LANGUAGE ENGLISH   LEARNER PREFERENCE PRIMARY LISTENING   ANSWERED BY patient   RELATIONSHIP SELF       Abuse Screening:  Abuse Screening Questionnaire 7/24/2018   Do you ever feel afraid of your partner? N   Are you in a relationship with someone who physically or mentally threatens you? N   Is it safe for you to go home? Y       Fall Risk  Fall Risk Assessment, last 12 mths 6/19/2020   Able to walk? Yes   Fall in past 12 months? No       Pt currently taking Anticoagulant therapy? ASA 81mg every day     Coordination of Care:  1. Have you been to the ER, urgent care clinic since your last visit? Hospitalized since your last visit? no    2. Have you seen or consulted any other health care providers outside of the [address] [place] [address] since your last visit? Include any pap smears or colon screening.  no

## 2020-06-19 NOTE — PROGRESS NOTES
Gabe Ordaz    Chief Complaint   Patient presents with    New Patient     referred by PCP for surgical clearance - having bladder surgery, date TBD, at [facility] with Dr. Allison Payne - no cardiac complaints      HPI    Gabe Ordaz is a 82 y.o. here for perioperative risk stratification prior to bladder surgery and also to establish her long-term cardiovascular care. This is a very long-term cardiovascular patient who unfortunately many of her prior physicians have retired. She used to see Dr. Umair Hoskins then more recently  (also PCP Dr. Bernice Sever). He established with Dr. Kristy Mayfield. Records are somewhat minimal at the time of my evaluation in regards to her cardiovascular history, was able to obtain some recent testing. Even though the patient does relate some history of atrial fibrillation I actually have no documentation that that she truly experience this. She had thought that she had A. fib and that this was told to her over 50 years ago so it is unclear that we would even be able to find anything. But what I can tell you she had a Holter monitor through [facility] in 2018 were also I have looked I have never seen atrial fibrillation documented. She has had some short runs of SVT the longest at 6 beats but she is completely asymptomatic with this. Also relays his history of mitral valve disease. I personally obtained and reviewed an echocardiogram that she had back in 2017. She is got low normal ejection fraction of 50% mild mitral regurgitation should be noted that the mitral valve is structurally normal dense of stenosis. Her left atrial and is normal volume at 30 cc have mild diastolic dysfunction. No complaints today from a cardiovascular standpoint. She does not notice her heart racing no palpitations no chest discomfort.     Past Medical History:   Diagnosis Date    Abnormal weight gain     Arthritis     Atrial fibrillation (HCC)     Cardiac arrhythmia     Chronic low back pain without sciatica     Drowsiness     Feels cold     History of artificial joint     Knee joint replacement status, right     Macrocytosis without anemia     Mitral valve disorder     MVP (mitral valve prolapse)     Osteopenia     PAF [facility]oxysmal atrial fibrillation) ([facility])     single episode age 70 due to scopolamine patch    PONV (postoperative nausea and vomiting)     no scopolamine patch    Recurrent UTI     Urethral caruncle     Urge incontinence of urine     Urge urinary incontinence     Urinary incontinence     unspec. Past Surgical History:   Procedure Laterality Date    HX BLADDER SUSPENSION      HX CATARACT REMOVAL Bilateral     HX HYSTERECTOMY      fibroids    HX OOPHORECTOMY Bilateral     benign mass    TOTAL KNEE ARTHROPLASTY Right        Current Outpatient Medications   Medication Sig Dispense Refill    cyanocobalamin (Vitamin B-12) 1,000 mcg/mL injection Inject 1 ml IM monthly 3 Vial 3    cephALEXin (KEFLEX) 250 mg capsule Take 250 mg by mouth daily.  calcium-cholecalciferol, d3, (CALCIUM 600 + D) 600-125 mg-unit tab Take  by mouth daily.  Syringe with Needle, Safety 3 mL 25 gauge x 5/8\" syrg Use with B12 15 Pen Needle 1    glucosamine sulfate 750 mg tab 750 mg.      fish oil-omega-3 fatty acids 340-1,000 mg capsule 2,000 mg.      B.infantis-B.ani-B.long-B.bifi (PROBIOTIC 4X) 10-15 mg TbEC Take  by mouth.  ascorbic acid (VITAMIN C) 1,000 mg tablet Take  by mouth.  estradiol (ESTRACE) 0.01 % (0.1 mg/gram) vaginal cream Apply small amount to urethral  Opening daily 85 g 3    OTHER 2 Tabs two (2) times a day. Macular Protect Complete vitamin      aspirin 81 mg tablet Take 81 mg by mouth.  folic acid 119 mcg tablet Take 400 mcg by mouth daily.  ASCORBIC ACID/VITAMIN E (CRANBERRY CONCENTRATE PO) Take  by mouth two (2) times a day.          Allergies   Allergen Reactions    Latex Anaphylaxis    Ampicillin Hives    Codeine Nausea and Vomiting  Doxycycline Hives     States can take with benadryl    Macrodantin [Nitrofurantoin Macrocrystalline] Hives    Other Medication Hives     Thallium dyes    Pcn [Penicillins] Hives     Other reaction(s): mild rash/itching    Sulfa (Sulfonamide Antibiotics) Hives     Other reaction(s): mild rash/itching    Tetracycline Hives    Formaldehyde Rash    Macrobid [Nitrofurantoin Monohyd/M-Cryst] Rash    Methenamine Hippurate Rash    Scopolamine Other (comments)     Other reaction(s): cardiac dysrhythmia  Patient reports A-Fib arrythmia    Thallium-201 Rash       Social History     Socioeconomic History    Marital status:      Spouse name: Not on file    Number of children: Not on file    Years of education: Not on file    Highest education level: Not on file   Occupational History    Not on file   Social Needs    Financial resource strain: Not on file    Food insecurity     Worry: Not on file     Inability: Not on file    Transportation needs     Medical: Not on file     Non-medical: Not on file   Tobacco Use    Smoking status: Never Smoker    Smokeless tobacco: Never Used   Substance and Sexual Activity    Alcohol use: No    Drug use: No    Sexual activity: Never   Lifestyle    Physical activity     Days per week: Not on file     Minutes per session: Not on file    Stress: Not on file   Relationships    Social connections     Talks on phone: Not on file     Gets together: Not on file     Attends Voodoo service: Not on file     Active member of club or organization: Not on file     Attends meetings of clubs or organizations: Not on file     Relationship status: Not on file    Intimate partner violence     Fear of current or ex partner: Not on file     Emotionally abused: Not on file     Physically abused: Not on file     Forced sexual activity: Not on file   Other Topics Concern    Not on file   Social History Narrative    Not on file        The patient has a family history of    Review of Systems    14 pt Review of Systems is negative unless otherwise mentioned in the HPI. Wt Readings from Last 3 Encounters:   06/19/20 59 kg (130 lb)   02/28/20 59.2 kg (130 lb 8 oz)   12/10/19 59.9 kg (132 lb)     Temp Readings from Last 3 Encounters:   02/28/20 97.5 °F (36.4 °C) (Oral)   12/10/19 97.7 °F (36.5 °C) (Tympanic)   08/06/19 96.4 °F (35.8 °C) (Oral)     BP Readings from Last 3 Encounters:   06/19/20 120/72   02/28/20 124/72   12/10/19 120/68     Pulse Readings from Last 3 Encounters:   06/19/20 69   02/28/20 66   12/10/19 66     Physical Exam:    Visit Vitals  /72 (BP 1 Location: Left arm, BP Patient Position: Sitting)   Pulse 69   Ht 5' 4.5\" (1.638 m)   Wt 59 kg (130 lb)   SpO2 97%   BMI 21.97 kg/m²      Physical Exam  HENT:      Head: Normocephalic and atraumatic. Eyes:      Pupils: Pupils are equal, round, and reactive to light. Cardiovascular:      Rate and Rhythm: Normal rate and regular rhythm. Heart sounds: Normal heart sounds. No murmur. No friction rub. No gallop. Pulmonary:      Effort: Pulmonary effort is normal. No respiratory distress. Breath sounds: Normal breath sounds. No wheezing or rales. Chest:      Chest wall: No tenderness. Abdominal:      General: Bowel sounds are normal.      Palpations: Abdomen is soft. Musculoskeletal:         General: No tenderness. Skin:     General: Skin is warm and dry. Neurological:      Mental Status: She is alert and oriented to person, place, and time. EKG today shows: NSR, normal axis and intervals, no ST segment abnormalities    Impression and Plan:   Lydia Arnold is a 82 y.o. with:    1.) Perioperative risk stratification prior to noncardiac/ bladder surgery  2.) Mild MR, no obvious prolapse  3.) H/o SVT, no documentation of pAF    1.) She is low risk from a cardiac standpoint and can proceed to the OR without further cardiovascular testing, is at highest risk for ectopy in the perioperative setting but this is benign and she is asymptomatic with it  2.) Doing well, RTC yearly    Thank you for allowing me to participate in the care of your patient, please do not hesitate to call with questions or concerns.     Kind Regards,    Claudine Mcmahan, DO

## 2020-06-19 NOTE — LETTER
6/19/20 Patient: Dawna Shaikh YOB: 1935 Date of Visit: 6/19/2020 Shai Shelton MD 
[address] [facility] [address] [place] [address] VIA In Basket Dear Shai Shelton MD, Thank you for referring Ms. Gennaro Olivas to [address] [place] [address] for evaluation. My notes for this consultation are attached. If you have questions, please do not hesitate to call me. I look forward to following your patient along with you. Sincerely, Beatriz Sanchez, DO

## 2020-06-29 ENCOUNTER — OFFICE VISIT (OUTPATIENT)
Dept: ORTHOPEDIC SURGERY | Age: 85
End: 2020-06-29

## 2020-06-29 VITALS
OXYGEN SATURATION: 97 % | BODY MASS INDEX: 20.81 KG/M2 | HEIGHT: 67 IN | SYSTOLIC BLOOD PRESSURE: 132 MMHG | HEART RATE: 68 BPM | RESPIRATION RATE: 16 BRPM | WEIGHT: 132.6 LBS | DIASTOLIC BLOOD PRESSURE: 61 MMHG | TEMPERATURE: 98 F

## 2020-06-29 DIAGNOSIS — Z96.651 HISTORY OF RIGHT KNEE JOINT REPLACEMENT: ICD-10-CM

## 2020-06-29 DIAGNOSIS — M54.50 LOW BACK PAIN, UNSPECIFIED BACK PAIN LATERALITY, UNSPECIFIED CHRONICITY, UNSPECIFIED WHETHER SCIATICA PRESENT: ICD-10-CM

## 2020-06-29 DIAGNOSIS — M51.36 DDD (DEGENERATIVE DISC DISEASE), LUMBAR: ICD-10-CM

## 2020-06-29 DIAGNOSIS — M47.816 LUMBAR FACET ARTHROPATHY: Primary | ICD-10-CM

## 2020-06-29 DIAGNOSIS — M53.3 PAIN OF RIGHT SACROILIAC JOINT: ICD-10-CM

## 2020-06-29 RX ORDER — METHYLPREDNISOLONE 4 MG/1
TABLET ORAL
Qty: 1 DOSE PACK | Refills: 0 | Status: SHIPPED | OUTPATIENT
Start: 2020-06-29 | End: 2020-08-17 | Stop reason: ALTCHOICE

## 2020-06-29 NOTE — PATIENT INSTRUCTIONS
Low Back Arthritis: Exercises  Introduction  Here are some examples of typical rehabilitation exercises for your condition. Start each exercise slowly. Ease off the exercise if you start to have pain. Your doctor or physical therapist will tell you when you can start these exercises and which ones will work best for you. When you are not being active, find a comfortable position for rest. Some people are comfortable on the floor or a medium-firm bed with a small pillow under their head and another under their knees. Some people prefer to lie on their side with a pillow between their knees. Don't stay in one position for too long. Take short walks (10 to 20 minutes) every 2 to 3 hours. Avoid slopes, hills, and stairs until you feel better. Walk only distances you can manage without pain, especially leg pain. How to do the exercises  Pelvic tilt   1. Lie on your back with your knees bent. 2. \"Brace\" your stomach--tighten your muscles by pulling in and imagining your belly button moving toward your spine. 3. Press your lower back into the floor. You should feel your hips and pelvis rock back. 4. Hold for 6 seconds while breathing smoothly. 5. Relax and allow your pelvis and hips to rock forward. 6. Repeat 8 to 12 times. Back stretches   1. Get down on your hands and knees on the floor. 2. Relax your head and allow it to droop. Round your back up toward the ceiling until you feel a nice stretch in your upper, middle, and lower back. Hold this stretch for as long as it feels comfortable, or about 15 to 30 seconds. 3. Return to the starting position with a flat back while you are on your hands and knees. 4. Let your back sway by pressing your stomach toward the floor. Lift your buttocks toward the ceiling. 5. Hold this position for 15 to 30 seconds. 6. Repeat 2 to 4 times. Follow-up care is a key part of your treatment and safety.  Be sure to make and go to all appointments, and call your doctor if you are having problems. It's also a good idea to know your test results and keep a list of the medicines you take. Where can you learn more? Go to http://dutch-osvaldo.info/  Enter T094 in the search box to learn more about \"Low Back Arthritis: Exercises. \"  Current as of: March 2, 2020               Content Version: 12.5  © 2006-2020 [employer]. Care instructions adapted under license by [employer] (which disclaims liability or warranty for this information). If you have questions about a medical condition or this instruction, always ask your healthcare professional. [place] [address] any warranty or liability for your use of this information. Sacroiliac Pain: Exercises  Introduction  Here are some examples of exercises for you to try. The exercises may be suggested for a condition or for rehabilitation. Start each exercise slowly. Ease off the exercises if you start to have pain. You will be told when to start these exercises and which ones will work best for you. How to do the exercises  Knee-to-chest stretch   7. Do not do the knee-to-chest exercise if it causes or increases back or leg pain. 8. Lie on your back with your knees bent and your feet flat on the floor. You can put a small pillow under your head and neck if it is more comfortable. 9. Grasp your hands under one knee and bring the knee to your chest, keeping the other foot flat on the floor. 10. Keep your lower back pressed to the floor. Hold for at least 15 to 30 seconds. 11. Relax and lower the knee to the starting position. Repeat with the other leg. 12. Repeat 2 to 4 times with each leg. 13. To get more stretch, keep your other leg flat on the floor while pulling your knee to your chest.    Bridging   7. Lie on your back with both knees bent. Your knees should be bent about 90 degrees. 8. Tighten your belly muscles by pulling in your belly button toward your spine. Then push your feet into the floor, squeeze your buttocks, and lift your hips off the floor until your shoulders, hips, and knees are all in a straight line. 9. Hold for about 6 seconds as you continue to breathe normally, and then slowly lower your hips back down to the floor and rest for up to 10 seconds. 10. Repeat 8 to 12 times. Hip extension   1. Get down on your hands and knees on the floor. 2. Keeping your back and neck straight, lift one leg straight out behind you. When you lift your leg, keep your hips level. Don't let your back twist, and don't let your hip drop toward the floor. 3. Hold for 6 seconds. Repeat 8 to 12 times with each leg. 4. If you feel steady and strong when you do this exercise, you can make it more difficult. To do this, when you lift your leg, also lift the opposite arm straight out in front of you. For example, lift the left leg and the right arm at the same time. (This is sometimes called the \"bird dog exercise. \") Hold for 6 seconds, and repeat 8 to 12 times on each side. Clamshell   1. Lie on your side with a pillow under your head. Keep your feet and knees together and your knees bent. 2. Raise your top knee, but keep your feet together. Do not let your hips roll back. Your legs should open up like a clamshell. 3. Hold for 6 seconds. 4. Slowly lower your knee back down. Rest for 10 seconds. 5. Repeat 8 to 12 times. 6. Switch to your other side and repeat steps 1 through 5. Hamstring wall stretch   1. Lie on your back in a doorway, with one leg through the open door. 2. Slide your affected leg up the wall to straighten your knee. You should feel a gentle stretch down the back of your leg. 3. Hold the stretch for at least 1 minute to begin. Then try to lengthen the time you hold the stretch to as long as 6 minutes. 4. Switch legs, and repeat steps 1 through 3.  5. Repeat 2 to 4 times.   6. If you do not have a place to do this exercise in a doorway, there is another way to do it:  7. Lie on your back, and bend one knee. 8. Loop a towel under the ball and toes of that foot, and hold the ends of the towel in your hands. 9. Straighten your knee, and slowly pull back on the towel. You should feel a gentle stretch down the back of your leg. 10. Switch legs, and repeat steps 1 through 3.  11. Repeat 2 to 4 times. 1. Do not arch your back. 2. Do not bend either knee. 3. Keep one heel touching the floor and the other heel touching the wall. Do not point your toes. Lower abdominal strengthening   1. Lie on your back with your knees bent and your feet flat on the floor. 2. Tighten your belly muscles by pulling your belly button in toward your spine. 3. Lift one foot off the floor and bring your knee toward your chest, so that your knee is straight above your hip and your leg is bent like the letter \"L. \"  4. Lift the other knee up to the same position. 5. Lower one leg at a time to the starting position. 6. Keep alternating legs until you have lifted each leg 8 to 12 times. 7. Be sure to keep your belly muscles tight and your back still as you are moving your legs. Be sure to breathe normally. Piriformis stretch   1. Lie on your back with your legs straight. 2. Lift your affected leg, and bend your knee. With your opposite hand, reach across your body, and then gently pull your knee toward your opposite shoulder. 3. Hold the stretch for 15 to 30 seconds. 4. Switch legs and repeat steps 1 through 3.  5. Repeat 2 to 4 times. Follow-up care is a key part of your treatment and safety. Be sure to make and go to all appointments, and call your doctor if you are having problems. It's also a good idea to know your test results and keep a list of the medicines you take. Where can you learn more? Go to http://dutch-osvaldo.info/  Enter E853 in the search box to learn more about \"Sacroiliac Pain: Exercises. \"  Current as of: March 2, 2020               Content Version: 12.5  © 8670-5356 [employer]. Care instructions adapted under license by [employer] (which disclaims liability or warranty for this information). If you have questions about a medical condition or this instruction, always ask your healthcare professional. [address] any warranty or liability for your use of this information.

## 2020-06-29 NOTE — LETTER
6/29/20 Patient: Mike Santiago YOB: 1935 Date of Visit: 6/29/2020 Brayden Broussard MD 
[address] [address] [place] [address] VIA In Basket Dear Brayden Broussard MD, Thank you for referring Ms. Margaret Alanis to [facility] for evaluation. My notes for this consultation are attached. If you have questions, please do not hesitate to call me. I look forward to following your patient along with you. Sincerely, Rd Gold MD

## 2020-06-29 NOTE — PROGRESS NOTES
[facility]  [address] [place], [facility] [address]  Phone: [phone]  Fax: [phone]    NEW PATIENT  Pt's YOB: 1935    ASSESSMENT   Diagnoses and all orders for this visit:    1. Lumbar facet arthropathy  -     methylPREDNISolone (MEDROL DOSEPACK) 4 mg tablet; Per dose pack instructions  -     REFERRAL TO PHYSICAL THERAPY    2. DDD (degenerative disc disease), lumbar  -     REFERRAL TO PHYSICAL THERAPY    3. Low back pain, unspecified back pain laterality, unspecified chronicity, unspecified whether sciatica present  -     AMB POC XRAY, SPINE, LUMBOSACRAL; 4+    4. Pain of right sacroiliac joint  -     REFERRAL TO PHYSICAL THERAPY    5. History of right knee joint replacement         IMPRESSION AND PLAN:  Kai Tang is a 82 y.o. female with history of low back pain x 4 years. Pt complains of progressive pain across the lower back pain that extends into the right buttock x 4 years. She reports severe pain and stiffness in the lower back when laying down and notes difficulty transitioning from sit to  order to use the bathroom at night. Pt takes Motrin as needed. 1) Pt was given information on lumbar arthritis and sacroiliac exercises. 2) Discussed treatment options with the patient including medication, physical therapy, and a right sacroiliac joint injection. 3) She was prescribed a Medrol Dosepak. 4) Pt was referred for lumbar physical therapy. 5) Discussed ordering a lumbar MRI pending persistent or progressive symptoms. 6) Ms. Amita Kendrick has a reminder for a \"due or due soon\" health maintenance. I have asked that she contact her primary care provider, Abner Pennington MD, for follow-up on this health maintenance. 7)  demonstrated consistency with prescribing. Follow-up and Dispositions    · Return in about 6 weeks (around 8/10/2020) for PT follow up, Medication follow up.            HISTORY OF PRESENT ILLNESS:  Lilian Velez is a 82 y.o. female with history of low back pain x 4 years. Pt presents to the office today as a new patient referred by Grace Iraheta MD. Pt complains of progressive pain across the lower back pain that extends into the right buttock x 4 years. She denies any pain, tightness, or a heavy sensation in the legs, particularly with activity, or any anterior groin pain. Pt reports severe pain and stiffness in the lower back when laying down and notes difficulty transitioning from sit to  order to use the bathroom at night. She also reports difficulty getting back into bed secondary to pain. Pt notes that she generally experiences relief when laying on her left side and notes pain when laying on her right side. She reports that she was prescribed a lumbar support 2016 when Dr. Jose Sanders ordered lumbar x-rays but notes that she never received it. Pt denies any previous lumbar surgery. She reports a right knee replacement with Dr. Flanagan about 10 years ago and denies any left knee pain at this time. Pt notes that she previously attended knee physical therapy at [facility]. She reports that she is scheduled for bladder sling revision surgery in [place]. Pt has been taking Motrin as needed with food and denies any stomach upset with the medication. Pt at this time desires to proceed with medication evaluation and physical therapy.       Pain Scale: 4/10     PCP: Grace Iraheta MD    Past Medical History:   Diagnosis Date    Abnormal weight gain     Arthritis     Atrial fibrillation (HCC)     Cardiac arrhythmia     Chronic low back pain without sciatica     Drowsiness     Feels cold     History of artificial joint     Knee joint replacement status, right     Macrocytosis without anemia     Mitral valve disorder     MVP (mitral valve prolapse)     Osteopenia     PAF (paroxysmal atrial fibrillation) (HCC)     single episode age 70 due to scopolamine patch    PONV (postoperative nausea and vomiting)     no scopolamine patch    Recurrent UTI     Urethral caruncle     Urge incontinence of urine     Urge urinary incontinence     Urinary incontinence     unspec. Social History     Socioeconomic History    Marital status:      Spouse name: Not on file    Number of children: Not on file    Years of education: Not on file    Highest education level: Not on file   Occupational History    Not on file   Social Needs    Financial resource strain: Not on file    Food insecurity     Worry: Not on file     Inability: Not on file    Transportation needs     Medical: Not on file     Non-medical: Not on file   Tobacco Use    Smoking status: Never Smoker    Smokeless tobacco: Never Used   Substance and Sexual Activity    Alcohol use: No    Drug use: No    Sexual activity: Never   Lifestyle    Physical activity     Days per week: Not on file     Minutes per session: Not on file    Stress: Not on file   Relationships    Social connections     Talks on phone: Not on file     Gets together: Not on file     Attends Jewish service: Not on file     Active member of club or organization: Not on file     Attends meetings of clubs or organizations: Not on file     Relationship status: Not on file    Intimate partner violence     Fear of current or ex partner: Not on file     Emotionally abused: Not on file     Physically abused: Not on file     Forced sexual activity: Not on file   Other Topics Concern    Not on file   Social History Narrative    Not on file       Current Outpatient Medications   Medication Sig Dispense Refill    methylPREDNISolone (MEDROL DOSEPACK) 4 mg tablet Per dose pack instructions 1 Dose Pack 0    cyanocobalamin (Vitamin B-12) 1,000 mcg/mL injection Inject 1 ml IM monthly 3 Vial 3    cephALEXin (KEFLEX) 250 mg capsule Take 250 mg by mouth daily.       calcium-cholecalciferol, d3, (CALCIUM 600 + D) 600-125 mg-unit tab Take  by mouth daily.      Syringe with Needle, Safety 3 mL 25 gauge x 5/8\" syrg Use with B12 15 Pen Needle 1    glucosamine sulfate 750 mg tab 750 mg.      fish oil-omega-3 fatty acids 340-1,000 mg capsule 2,000 mg.      B.infantis-B.ani-B.long-B.bifi (PROBIOTIC 4X) 10-15 mg TbEC Take  by mouth.  ascorbic acid (VITAMIN C) 1,000 mg tablet Take  by mouth.  estradiol (ESTRACE) 0.01 % (0.1 mg/gram) vaginal cream Apply small amount to urethral  Opening daily 85 g 3    OTHER 2 Tabs two (2) times a day. Macular Protect Complete vitamin      aspirin 81 mg tablet Take 81 mg by mouth.  folic acid 818 mcg tablet Take 400 mcg by mouth daily.  ASCORBIC ACID/VITAMIN E (CRANBERRY CONCENTRATE PO) Take  by mouth two (2) times a day. Allergies   Allergen Reactions    Latex Anaphylaxis    Ampicillin Hives    Codeine Nausea and Vomiting    Doxycycline Hives     States can take with benadryl    Macrodantin [Nitrofurantoin Macrocrystalline] Hives    Other Medication Hives     Thallium dyes    Pcn [Penicillins] Hives     Other reaction(s): mild rash/itching    Sulfa (Sulfonamide Antibiotics) Hives     Other reaction(s): mild rash/itching    Tetracycline Hives    Formaldehyde Rash    Macrobid [Nitrofurantoin Monohyd/M-Cryst] Rash    Methenamine Hippurate Rash    Scopolamine Other (comments)     Other reaction(s): cardiac dysrhythmia  Patient reports A-Fib arrythmia    Thallium-201 Rash       REVIEW OF SYSTEMS    Constitutional: Negative for fever, chills, or weight change. Respiratory: Negative for cough or shortness of breath. Cardiovascular: Negative for chest pain or palpitations. Gastrointestinal: Negative for acid reflux, change in bowel habits, or constipation. Genitourinary: Negative for dysuria and flank pain. Musculoskeletal: Positive for lumbar pain. Skin: Negative for rash. Neurological: Negative for headaches, dizziness, or numbness.   Endo/Heme/Allergies: Negative for increased bruising. Psychiatric/Behavioral: Positive for difficulty with sleep. PHYSICAL EXAMINATION  Visit Vitals  /61   Pulse 68   Temp 98 °F (36.7 °C) (Oral)   Resp 16   Ht 5' 7\" (1.702 m)   Wt 132 lb 9.6 oz (60.1 kg)   SpO2 97%   BMI 20.77 kg/m²       Constitutional: Awake, alert, and in no acute distress. HEENT: Normocephalic. Atraumatic. Oropharynx is moist and clear. PERRL. EOMI. Sclerae are nonicteric  Cardiovascular: Regular rate and rhythm  Lungs: Clear to auscultation bilaterally  Abdomen: Soft and nontender. Bowel sounds are present  Neurological: 1+ symmetrical DTRs in the upper extremities. 1+ symmetrical DTRs in the lower extremities. Sensation to light touch is intact. Negative Lenz's sign bilaterally. Skin: warm, dry, and intact. Musculoskeletal: Good range of motion with side to side cervical flexion. Good range of motion in both shoulders. Tenderness to palpation in the lower lumbar region, R>L. Tenderness to palpation over the right sacroiliac joint. Moderate  pain with extension and axial loading. Improvement with forward flexion. Mild decreased range of motion with internal rotation of her right hip; good range of motion in the left. Negative FABERs test bilaterally. Negative straight leg raise bilaterally. No pain with heel or toe walking. No difficulty with the single leg stance bilaterally. Biceps  Triceps Deltoids Wrist Ext Wrist Flex Hand Intrin   Right +4/5 +4/5 +4/5 +4/5 +4/5 +4/5   Left +4/5 +4/5 +4/5 +4/5 +4/5 +4/5      Hip Flex  Quads Hamstrings Ankle DF EHL Ankle PF   Right +4/5 +4/5 +4/5 +4/5 +4/5 +4/5   Left +4/5 +4/5 +4/5 +4/5 +4/5 +4/5     IMAGING:    Lumbar spine 4V x-rays from 06/29/2020 were personally reviewed with the patient and demonstrated:  Dextroscoliosis. Straightening of the lumbar spine. Severe degenerative disc at L5-S1 and L3-4. Fusion at L4-5. No instability. Mild atherosclerosis.     Written by David Bedoya, as dictated by Keren Brown, MD. PIERCE, Dr. Robinson Simeon confirm that all documentation is accurate.

## 2020-07-06 ENCOUNTER — TELEPHONE (OUTPATIENT)
Dept: FAMILY MEDICINE CLINIC | Age: 85
End: 2020-07-06

## 2020-07-06 DIAGNOSIS — N36.2 URETHRAL CARUNCLE: ICD-10-CM

## 2020-07-06 DIAGNOSIS — Z01.818 PREOPERATIVE TESTING: Primary | ICD-10-CM

## 2020-07-06 NOTE — TELEPHONE ENCOUNTER
Ms. Cathi Todd called stating she needed to make a clearance appointment prior to scheduling surgery (bladder tap). Ms. Cathi Todd just recently seen Cardio Specialist Dr. Cristino Gtz. Pt states Dr. Cristino Gtz would be in touch with Dr. Greg Steve. They can't schedule surgery until clearance is completed. Ms. Cathi Todd wants to know if she can schedule that soon . Please return her call to [phone].

## 2020-07-06 NOTE — TELEPHONE ENCOUNTER
This patient contacted office for the following prescriptions to be filled:    Medication requested :   Requested Prescriptions     Pending Prescriptions Disp Refills    cyanocobalamin (Vitamin B-12) 1,000 mcg/mL injection 3 Vial 3     Sig: Inject 1 ml IM monthly     PCP: Dr. Prince Franco or Print: [facility]  Mail order or Local pharmacy [phone]    Scheduled appointment if not seen by current providers in office: [address] [place] [address] 2/28/2020, messages taken today about a clearance appt needed but waiting for answer to see if virtual is appropriate or we need in office. This was sent in back in April but to a local pharmacy and looks like it was set to no print. Pt states she had wanted it through [facility] [employer] her mail order which was documented in that encounter, when she called them today she was informed she had no refills there. I repended med with correct pharmacy.

## 2020-07-07 RX ORDER — CYANOCOBALAMIN 1000 UG/ML
INJECTION, SOLUTION INTRAMUSCULAR; SUBCUTANEOUS
Qty: 3 VIAL | Refills: 4 | Status: SHIPPED | OUTPATIENT
Start: 2020-07-07 | End: 2021-05-25 | Stop reason: ALTCHOICE

## 2020-07-07 NOTE — TELEPHONE ENCOUNTER
Patient called the office back  verified asked when was her procedure scheduled to be done she says she has not been scheduled as of yet she was told by Dr. Sreedhar Calvo to have her PCM clear her then she would schedule her procedure. Asked if she knew if Dr. Sreedhar Calvo will accept VV as clearance patient is not sure told I will call Dr. Cherrie Neville office to find out and then will call her back to get her scheduled.

## 2020-07-08 NOTE — TELEPHONE ENCOUNTER
Unable to find a good contact number for Dr. Paulo Fink called patient to ask if she has contact number no answer left message asking her to call the office back.

## 2020-07-08 NOTE — TELEPHONE ENCOUNTER
Patient called the office back she gave telephone number for Dr. Donna Muse [phone], left message asking for a return call to discuss procedure and pre-op for patient, office number left.

## 2020-07-10 ENCOUNTER — HOSPITAL ENCOUNTER (OUTPATIENT)
Dept: PHYSICAL THERAPY | Age: 85
Discharge: HOME | End: 2020-07-10
Payer: MEDICARE

## 2020-07-10 PROCEDURE — 97535 SELF CARE MNGMENT TRAINING: CPT

## 2020-07-10 PROCEDURE — 97162 PT EVAL MOD COMPLEX 30 MIN: CPT

## 2020-07-10 PROCEDURE — 97110 THERAPEUTIC EXERCISES: CPT

## 2020-07-10 NOTE — PROGRESS NOTES
[facility]  [address]  [place], [address]  [phone] fax    Plan of Care/ Statement of Necessity for Physical Therapy Services    Patient name: Shruthi Solares Start of Care: 7/10/2020   Referral source: Daya Woody MD : 1935    Medical Diagnosis: Low back pain [M54.5]  Payor: Rm Ingram / Plan: [place] MEDICARE PART A & B / Product Type: Medicare /  Onset Date:chronic, progressive increase over last 2 years    Treatment Diagnosis: low back pain, right sciatica   Prior Hospitalization: see medical history Provider#: [id number]   Medications: Verified on Patient summary List    Comorbidities: arthritis, stress incontinence   Prior Level of Function: manages ADLs with pain      The Plan of Care and following information is based on the information from the initial evaluation. Assessment/ key information: Pt is an 72-year-old female with c/o of chronic low back pain with recent worsening. Pt reports pain is sharp and extends into her right buttocks at times. Prolonged sitting, bed mobility following waking at night to urinate, and stooping increase pain. Pt lives at home alone and does not access her second floor due to fear of falling. Reports doing ADLs such as lawn mowing and dragging in fertilizer independently but carrying and dragging heavy things increases her back pain. Examination finds decreased B LE strength, decreased flexibility, decreased lumbar ROM, and positive sciatic neural tension. SIJ cluster testing is negative. Pt provided with an HEP.  Patient will benefit from skilled PT services to modify and progress therapeutic interventions, address functional mobility deficits, address ROM deficits, address strength deficits, analyze and address soft tissue restrictions, analyze and cue movement patterns, analyze and modify body mechanics/ergonomics, assess and modify postural abnormalities, address imbalance/dizziness and instruct in home and community integration to attain remaining goals. Evaluation Complexity History MEDIUM  Complexity : 1-2 comorbidities / personal factors will impact the outcome/ POC ; Examination MEDIUM Complexity : 3 Standardized tests and measures addressing body structure, function, activity limitation and / or participation in recreation  ;Presentation MEDIUM Complexity : Evolving with changing characteristics  ; Clinical Decision Making MEDIUM Complexity : FOTO score of 26-74  Overall Complexity Rating: MEDIUM  Problem List: pain affecting function, decrease ROM, decrease strength, impaired gait/ balance, decrease ADL/ functional abilitiies, decrease activity tolerance, decrease flexibility/ joint mobility and decrease transfer abilities   Treatment Plan may include any combination of the following: Therapeutic exercise, Therapeutic activities, Neuromuscular re-education, Physical agent/modality, Gait/balance training, Manual therapy, Patient education, Self Care training, Functional mobility training, Home safety training and Stair training  Patient / Family readiness to learn indicated by: asking questions, trying to perform skills and interest  Persons(s) to be included in education: patient (P)  Barriers to Learning/Limitations: None  Patient Goal (s): re-align back  Patient Self Reported Health Status: excellent  Rehabilitation Potential: good    Short Term Goals: To be accomplished in 2 weeks:  1. Pt will report daily compliance with HEP to maximize therapeutic success. 2. Pt will demonstrate improved ease of transferring from supine to prone, showing improved flexibility for bed mobility. Long Term Goals: To be accomplished in 4 weeks:  1. Pt will improve FOTO to 65, demonstrating improved functional capacity for ADLs. 2. Pt will improve B hip strength MMT to 4+/5 or better to improve ease of ADLs. 3. Pt will improve B knee strength MMT to 4+/5 or better to improve ease of ADLs.   4. Pt will improve lumbar ROM to 75% or better without increased pain to improve ease of bed mobility. 5. Pt will report decreased LBP to 3/10 or better to improve QOL. Frequency / Duration: Patient to be seen 2 times per week for 4 weeks. Patient/ Caregiver education and instruction: Diagnosis, prognosis, self care, activity modification and exercises   [x]  Plan of care has been reviewed with PTA    Certification Period: 7/10/2020 -- 8/8/2020  Paz Franklin, PT 7/10/2020 11:33 AM    ________________________________________________________________________    I certify that the above Therapy Services are being furnished while the patient is under my care. I agree with the treatment plan and certify that this therapy is necessary.     [de-identified] Signature:____________________  Date:____________Time: _________    Please sign and return to [facility]  [address]  [place] [address]  [phone] fax

## 2020-07-10 NOTE — TELEPHONE ENCOUNTER
Attempted to obtain blood cultures. Patient unable to tolerate.  Will send another nurse in to attempt     Rosmery Harmon RN  03/16/20 5125 Called Dr. Ashwini Cabrera office left message asking for a return call to discuss pre-op visit for patient, patient name and  left on the voicemail.

## 2020-07-10 NOTE — PROGRESS NOTES
PT DAILY TREATMENT NOTE 10-18    Patient Name: Chris Sierra  Date:7/10/2020  : 1935  [x]  Patient  Verified  Payor: VA MEDICARE / Plan: [place] MEDICARE PART A & B / Product Type: Medicare /    In HWUL:[id number]  Out time:1100  Total Treatment Time (min): 42  Visit #: 1 of 8    Medicare/BCBS Only   Total Timed Codes (min):  15 1:1 Treatment Time: 42       Treatment Area: Low back pain [M54.5]    SUBJECTIVE  Pain Level (0-10 scale): 7  Any medication changes, allergies to medications, adverse drug reactions, diagnosis change, or new procedure performed?: [x] No    [] Yes (see summary sheet for update)  Subjective functional status/changes:   [] No changes reported  See paper chart    OBJECTIVE    15 min [x]Eval                  []Re-Eval       18 min Therapeutic Exercise:  HEP performance and education   Rationale: increase ROM and increase strength to improve the patients ability to perform ADLs with reduced pain. 10 min Self Care/Home Management:  Pt educated on pathophysiology, anatomy, and causes of low back pain and sciatic pains and how to address impairments. Discussion of PT treatment and education regarding pelvic PT per pt questioning. Rationale: increase ROM and increase strength  to improve the patients ability to perform ADLs following physical therapy. Other Objective/Functional Measures: see paper chart     Pain Level (0-10 scale) post treatment: 5    ASSESSMENT/Changes in Function: Pt is an 82-year-old female with c/o of chronic low back pain with recent worsening. Pt reports pain is sharp and extends into her right buttocks at times. Prolonged sitting, bed mobility following waking at night to urinate, and stooping increase pain. Pt lives at home alone and does not access her second floor due to fear of falling. Reports doing ADLs such as lawn mowing and dragging in fertilizer independently but carrying and dragging heavy things increases her back pain.  Examination finds decreased B LE strength, decreased flexibility, decreased lumbar ROM, and positive sciatic neural tension. SIJ cluster testing is negative. Pt provided with an HEP. Patient will benefit from skilled PT services to modify and progress therapeutic interventions, address functional mobility deficits, address ROM deficits, address strength deficits, analyze and address soft tissue restrictions, analyze and cue movement patterns, analyze and modify body mechanics/ergonomics, assess and modify postural abnormalities, address imbalance/dizziness and instruct in home and community integration to attain remaining goals. [x]  See Plan of Care  []  See progress note/recertification  []  See Discharge Summary         Progress towards goals / Updated goals:  Short Term Goals: To be accomplished in 2 weeks:  1. Pt will report daily compliance with HEP to maximize therapeutic success. Eval: HEP assigned  2. Pt will demonstrate improved ease of transferring from supine to prone, showing improved flexibility for bed mobility. Eval: requires therapist assistance due to increased difficulty and pain  Long Term Goals: To be accomplished in 4 weeks:  1. Pt will improve FOTO to 65, demonstrating improved functional capacity for ADLs. Eval: 56  2. Pt will improve B hip strength MMT to 4+/5 or better to improve ease of ADLs. Eval: right hip flex/add 4-/5, abd/ext 4/5; left hip flex/abd 4/5, ext 3+/5, add 4-/5  3. Pt will improve B knee strength MMT to 4+/5 or better to improve ease of ADLs. Eval: B knee ext 4/5, flex 4-/5  4. Pt will improve lumbar ROM to 75% or better without increased pain to improve ease of bed mobility. Eval: ext 25% pain, right lat flex 50% pain, right rot 50%, left rot 25% pain  5. Pt will report decreased LBP to 3/10 or better to improve QOL.    Eval: 7/10       PLAN  []  Upgrade activities as tolerated     [x]  Continue plan of care  []  Update interventions per flow sheet       []  Discharge due to:_  []  Other:_ Haresh Velasquez, PT 7/10/2020  11:46 AM    Future Appointments   Date Time Provider Delmer Montano   7/13/2020 12:30 PM Nellie Talavera, PTA [facility]   7/17/2020  3:00 PM Sangita Dumont, PTA [facility]   7/21/2020  9:45 AM Nellie Talavera, PTA [facility]   7/24/2020 11:00 AM Silvia Lass, PT [facility]   7/28/2020 12:45 PM Silvia Livingston, PT [facility]   7/31/2020 11:00 AM Silvia Christofers, PT [facility]   8/4/2020 11:15 AM Silvia Lass, PT [facility]   8/7/2020 11:00 AM Silvia Christofers, PT [facility]   8/17/2020 10:45 AM Jason Corcoran  [address]   6/18/2021  1:00 PM Hector Renteria,  [address] [place] [address]

## 2020-07-10 NOTE — TELEPHONE ENCOUNTER
Ms. Amelie Domínguez called stating that Dr. Becca Bolton can do a virtual visit.  Contact pt back at [phone]

## 2020-07-13 ENCOUNTER — HOSPITAL ENCOUNTER (OUTPATIENT)
Dept: PHYSICAL THERAPY | Age: 85
Discharge: HOME | End: 2020-07-13
Payer: MEDICARE

## 2020-07-13 PROCEDURE — 97110 THERAPEUTIC EXERCISES: CPT

## 2020-07-13 PROCEDURE — 97112 NEUROMUSCULAR REEDUCATION: CPT

## 2020-07-13 NOTE — PROGRESS NOTES
PT DAILY TREATMENT NOTE 10-18    Patient Name: Paige Moritz  Date:2020  : 1935  [x]  Patient  Verified  Payor: [place] MED[place]ARE / Plan: [place] MEDICARE PART A & B / Product Type: Medicare /    In time:12:30  Out time:1:10  Total Treatment Time (min): 40  Visit #: 2 of 8    Medicare/BCBS Only   Total Timed Codes (min):  40 1:1 Treatment Time:  40       Treatment Area: Low back pain [M54.5]    SUBJECTIVE  Pain Level (0-10 scale): 0/10  Any medication changes, allergies to medications, adverse drug reactions, diagnosis change, or new procedure performed?: [x] No    [] Yes (see summary sheet for update)  Subjective functional status/changes:   [] No changes reported  Pt reports no new complaints of pain. Pt reports compliance with HEP. OBJECTIVE      30 min Therapeutic Exercise:  [x] See flow sheet :   Rationale: increase ROM and increase strength to improve the patients ability to tolerate ADLs. 10 min Neuromuscular Re-education:  [x]  See flow sheet :   Rationale: increase strength, improve coordination and increase proprioception  to improve the patients ability to tolerate ADLs. With   [] TE   [] TA   [] neuro   [] other: Patient Education: [x] Review HEP    [] Progressed/Changed HEP based on:   [] positioning   [] body mechanics   [] transfers   [] heat/ice application    [] other:      Other Objective/Functional Measures: Initiated exercises as per flow sheet. Pain Level (0-10 scale) post treatment: 0/10    ASSESSMENT/Changes in Function: Pt demonstrates good carry over with vc's for form and has no increase in symptoms. Patient will continue to benefit from skilled PT services to modify and progress therapeutic interventions, address functional mobility deficits, address ROM deficits, address strength deficits, analyze and address soft tissue restrictions and analyze and cue movement patterns to attain remaining goals.      []  See Plan of Care  []  See progress note/recertification  []  See Discharge Summary         Progress towards goals / Updated goals:  Short Term Goals: To be accomplished in 2 weeks:  1. Pt will report daily compliance with HEP to maximize therapeutic success. Eval: HEP assigned  2. Pt will demonstrate improved ease of transferring from supine to prone, showing improved flexibility for bed mobility. Eval: requires therapist assistance due to increased difficulty and pain  Long Term Goals: To be accomplished in 4 weeks:  1. Pt will improve FOTO to 65, demonstrating improved functional capacity for ADLs. Eval: 56  2. Pt will improve B hip strength MMT to 4+/5 or better to improve ease of ADLs. Eval: right hip flex/add 4-/5, abd/ext 4/5; left hip flex/abd 4/5, ext 3+/5, add 4-/5  3. Pt will improve B knee strength MMT to 4+/5 or better to improve ease of ADLs. Eval: B knee ext 4/5, flex 4-/5  4. Pt will improve lumbar ROM to 75% or better without increased pain to improve ease of bed mobility. Eval: ext 25% pain, right lat flex 50% pain, right rot 50%, left rot 25% pain  5. Pt will report decreased LBP to 3/10 or better to improve QOL.               Eval: 7/10     PLAN  []  Upgrade activities as tolerated     [x]  Continue plan of care  []  Update interventions per flow sheet       []  Discharge due to:_  []  Other:_      Paula Hill, PTA 7/13/2020  12:48 PM    Future Appointments   Date Time Provider [facility]   7/17/2020  3:00 PM Teri Baez, PTA [facility]   7/21/2020  9:45 AM Burlington Fragmin, PTA [facility]   7/24/2020 11:00 AM [facility], PT [facility]   7/28/2020 12:45 PM [facility], PT [facility]   7/31/2020 11:00 AM [facility], PT [facility]   8/4/2020 11:15 AM [facility], PT [facility]   8/7/2020 11:00 AM [facility], PT [facility]   8/17/2020 10:45 AM Bandar Morgan  [address]   6/18/2021  1:00 PM Aaron Hoover,  [facility]

## 2020-07-14 NOTE — TELEPHONE ENCOUNTER
Has had cardiology evaluation. Does need preop labs prior to VV which I've ordered. Thank you.  ELEANOR

## 2020-07-14 NOTE — TELEPHONE ENCOUNTER
Called patient  verified she has been scheduled for pre op on 2020 for bladder sling. She says Dr. Elva Alarcon has not ordered any pre-op testing says the only think she needs is to be cleared by her PCM.

## 2020-07-15 NOTE — TELEPHONE ENCOUNTER
Patient has been scheduled for lab draw in the office on 7/28/2020, she has been made aware she will need to call the office from the parking lot, she will be screened and we prefer she come into the office alone unless she needs someone to assist her. She has also been told she will have to wear mask the entire time she is in the office. Patient has expressed understanding.

## 2020-07-17 ENCOUNTER — HOSPITAL ENCOUNTER (OUTPATIENT)
Dept: PHYSICAL THERAPY | Age: 85
Discharge: HOME | End: 2020-07-17
Payer: MEDICARE

## 2020-07-17 PROCEDURE — 97112 NEUROMUSCULAR REEDUCATION: CPT

## 2020-07-17 PROCEDURE — 97110 THERAPEUTIC EXERCISES: CPT

## 2020-07-17 NOTE — PROGRESS NOTES
PT DAILY TREATMENT NOTE 10-18    Patient Name: Haider Guy  Date:2020  : 1935  [x]  Patient  Verified  Payor: [place] MEDICARE / Plan: [place] MEDICARE PART A & B / Product Type: Medicare /    In time:3:00  Out time:3:41  Total Treatment Time (min): 41  Visit #: 3 of 8    Medicare/BCBS Only   Total Timed Codes (min):  41 1:1 Treatment Time:  41       Treatment Area: Low back pain [M54.5]    SUBJECTIVE  Pain Level (0-10 scale): 0  Any medication changes, allergies to medications, adverse drug reactions, diagnosis change, or new procedure performed?: [x] No    [] Yes (see summary sheet for update)  Subjective functional status/changes:   [] No changes reported  Pt reports she had a lot of pain in her right hip and butt last night that kept her awake at night     OBJECTIVE        26 min Therapeutic Exercise:  [x] See flow sheet :   Rationale: increase ROM and increase strength to improve the patients ability to perform functional task with ease. 15 min Neuromuscular Re-education:  [x]  See flow sheet :    Rationale: increase strength, improve coordination and increase proprioception  to improve the patients ability to preform ADL's with ease. With   [] TE   [] TA   [] neuro   [] other: Patient Education: [x] Review HEP    [] Progressed/Changed HEP based on:   [] positioning   [] body mechanics   [] transfers   [] heat/ice application    [] other:      Other Objective/Functional Measures:      Pain Level (0-10 scale) post treatment: 0    ASSESSMENT/Changes in Function:   Pt reports no increased pain with therex and was able to complete exercises as prescribed. Pt continues to display good form and control with therex and notes decreased discomfort in the right l/s and glute with exercises. Pt reports HEP compliance and understanding.     Patient will continue to benefit from skilled PT services to modify and progress therapeutic interventions, address functional mobility deficits, address ROM deficits, address strength deficits, analyze and address soft tissue restrictions, analyze and cue movement patterns, analyze and modify body mechanics/ergonomics and assess and modify postural abnormalities to attain remaining goals. [x]  See Plan of Care  []  See progress note/recertification  []  See Discharge Summary         Progress towards goals / Updated goals:  Short Term Goals: To be accomplished in 2 weeks:  1. Pt will report daily compliance with HEP to maximize therapeutic success.              Eval: HEP assigned   Current: Met 7/17/20 Pt reports understanding and compliance. 2. Pt will demonstrate improved ease of transferring from supine to prone, showing improved flexibility for bed mobility.             Eval: requires therapist assistance due to increased difficulty and pain  Long Term Goals: To be accomplished in 4 weeks:  1. Pt will improve FOTO to 65, demonstrating improved functional capacity for ADLs.             Eval: 56  2. Pt will improve B hip strength MMT to 4+/5 or better to improve ease of ADLs.             Eval: right hip flex/add 4-/5, abd/ext 4/5; left hip flex/abd 4/5, ext 3+/5, add 4-/5  3. Pt will improve B knee strength MMT to 4+/5 or better to improve ease of ADLs.             Eval: B knee ext 4/5, flex 4-/5  4. Pt will improve lumbar ROM to 75% or better without increased pain to improve ease of bed mobility.             Eval: ext 25% pain, right lat flex 50% pain, right rot 50%, left rot 25% pain  5. Pt will report decreased LBP to 3/10 or better to improve QOL.               Eval: 7/10     PLAN  []  Upgrade activities as tolerated     [x]  Continue plan of care  []  Update interventions per flow sheet       []  Discharge due to:_  []  Other:_      Jimena Chavez PTA 7/17/2020  1:56 PM    Future Appointments   Date Time Provider [facility]   7/17/2020  3:00 PM [facility]   7/21/2020  9:45 AM Maria L Ferrari PTA [facility]   7/24/2020 11:00 AM [facility], PT [facility]   7/28/2020 12:45 PM [facility], PT [facility]   7/28/2020  2:30 PM Onesimo Oreilly MD [facility]   7/31/2020 11:00 AM [facility], PT [facility]   8/4/2020 11:15 AM [facility], PT [facility]   8/4/2020 11:30 AM Onesimo Oreilly MD [facility]   8/7/2020 11:15 AM [facility], PT [facility]   8/17/2020 10:45 AM Bandar Morgan  [address]    6/18/2021  1:00 PM Jerica Cooper [address]

## 2020-07-21 ENCOUNTER — HOSPITAL ENCOUNTER (OUTPATIENT)
Dept: PHYSICAL THERAPY | Age: 85
Discharge: HOME | End: 2020-07-21
Payer: MEDICARE

## 2020-07-21 PROCEDURE — 97112 NEUROMUSCULAR REEDUCATION: CPT

## 2020-07-21 PROCEDURE — 97110 THERAPEUTIC EXERCISES: CPT

## 2020-07-21 NOTE — PROGRESS NOTES
PT DAILY TREATMENT NOTE 10-18    Patient Name: Jade Epstein  Date:2020  : 1935  [x]  Patient  Verified  Payor: [place] MEDICARE / Plan: [place] MEDICARE PART A & B / Product Type: Medicare /    In time:9:45  Out time:10:30  Total Treatment Time (min): 45  Visit #: 4 of 8    Medicare/BCBS Only   Total Timed Codes (min):  35 1:1 Treatment Time:  35       Treatment Area: Low back pain [M54.5]    SUBJECTIVE  Pain Level (0-10 scale): 5/10  Any medication changes, allergies to medications, adverse drug reactions, diagnosis change, or new procedure performed?: [x] No    [] Yes (see summary sheet for update)  Subjective functional status/changes:   [] No changes reported  Pt reports she is in more pain since starting PT. Pt reports pain in her right buttocks. OBJECTIVE    Modality rationale: decrease inflammation and decrease pain to improve the patients ability to tolerate ADLs.     Min Type Additional Details    [] Estim:  []Unatt       []IFC  []Premod                        []Other:  []w/ice   []w/heat  Position:  Location:    [] Estim: []Att    []TENS instruct  []NMES                    []Other:  []w/US   []w/ice   []w/heat  Position:  Location:    []  Traction: [] Cervical       []Lumbar                       [] Prone          []Supine                       []Intermittent   []Continuous Lbs:  [] before manual  [] after manual    []  Ultrasound: []Continuous   [] Pulsed                           []1MHz   []3MHz W/cm2:  Location:    []  Iontophoresis with dexamethasone         Location: [] Take home patch   [] In clinic   10 []  Ice     [x]  heat  []  Ice massage  []  Laser   []  Anodyne Position: sitting  Location:right lumbar/glut    []  Laser with stim  []  Other:  Position:  Location:    []  Vasopneumatic Device Pressure:       [] lo [] med [] hi   Temperature: [] lo [] med [] hi   [] Skin assessment post-treatment:  []intact []redness- no adverse reaction    []redness - adverse reaction:     25 min Therapeutic Exercise:  [x] See flow sheet :   Rationale: increase ROM and increase strength to improve the patients ability to tolerate ADLs. 10 min Manual Therapy:  STM to right glute/piriformis. Rationale: decrease pain, increase ROM and increase tissue extensibility to improve functional mobility. With   [] TE   [] TA   [] neuro   [] other: Patient Education: [x] Review HEP    [] Progressed/Changed HEP based on:   [] positioning   [] body mechanics   [] transfers   [] heat/ice application    [] other:      Other Objective/Functional Measures: Pt has continued trigger points through right glut/piriformis. Pain Level (0-10 scale) post treatment: 2/10    ASSESSMENT/Changes in Function: Pt has decreased symptoms after manual treatment. Pt demonstrates good form with all exercises. Patient will continue to benefit from skilled PT services to modify and progress therapeutic interventions, address functional mobility deficits, address ROM deficits, address strength deficits and analyze and address soft tissue restrictions to attain remaining goals. []  See Plan of Care  []  See progress note/recertification  []  See Discharge Summary         Progress towards goals / Updated goals:  Short Term Goals: To be accomplished in 2 weeks:  1. Pt will report daily compliance with HEP to maximize therapeutic success.              Eval: HEP assigned              Current: Met 7/17/20 Pt reports understanding and compliance. 2. Pt will demonstrate improved ease of transferring from supine to prone, showing improved flexibility for bed mobility.             Eval: requires therapist assistance due to increased difficulty and pain  Long Term Goals: To be accomplished in 4 weeks:  1. Pt will improve FOTO to 65, demonstrating improved functional capacity for ADLs.             Eval: 56  2. Pt will improve B hip strength MMT to 4+/5 or better to improve ease of ADLs.               Eval: right hip flex/add 4-/5, abd/ext 4/5; left hip flex/abd 4/5, ext 3+/5, add 4-/5  3. Pt will improve B knee strength MMT to 4+/5 or better to improve ease of ADLs.             Eval: B knee ext 4/5, flex 4-/5  4. Pt will improve lumbar ROM to 75% or better without increased pain to improve ease of bed mobility.             Eval: ext 25% pain, right lat flex 50% pain, right rot 50%, left rot 25% pain  5. Pt will report decreased LBP to 3/10 or better to improve QOL.               Eval: 7/10        PLAN  []  Upgrade activities as tolerated     [x]  Continue plan of care  []  Update interventions per flow sheet       []  Discharge due to:_  []  Other:_      Ken Up, GARY 7/21/2020  9:55 AM    Future Appointments   Date Time Provider [facility]   7/24/2020 11:00 AM Primus Charity, PT [facility]   7/28/2020 12:45 PM Primus Charity, PT [facility]   7/28/2020  2:30 PM MD Mary Kate Loza   7/31/2020 11:00 AM Primus Charity, PT [facility]   8/4/2020 11:15 AM Primus Charity, PT [facility]   8/4/2020 11:30 AM MD Mary Kate Loza   8/7/2020 11:15 AM Primus Charity, PT [facility]   8/17/2020 10:45 AM Antony Barlow  [address]   6/18/2021  1:00 PM Pool Boyd, DO [address] [place] [address]

## 2020-07-22 ENCOUNTER — TELEPHONE (OUTPATIENT)
Dept: FAMILY MEDICINE CLINIC | Age: 85
End: 2020-07-22

## 2020-07-22 NOTE — TELEPHONE ENCOUNTER
Ms. Emily Dunham is calling requesting to speak with Madelyn Acosta. Please return call [phone].

## 2020-07-22 NOTE — TELEPHONE ENCOUNTER
Called patients home number x 2 line is busy called mobile number spoke with patient who says she has hurt her back she is seeing Dr. Patric Rodriguez for this but says she does not know when she will be able to have her surgery done. She has cancelled her pre-op appt and will call the office when she is ready to reschedule.

## 2020-07-24 ENCOUNTER — HOSPITAL ENCOUNTER (OUTPATIENT)
Dept: PHYSICAL THERAPY | Age: 85
Discharge: HOME | End: 2020-07-24
Payer: MEDICARE

## 2020-07-24 PROCEDURE — 97140 MANUAL THERAPY 1/> REGIONS: CPT

## 2020-07-24 PROCEDURE — 97110 THERAPEUTIC EXERCISES: CPT

## 2020-07-24 PROCEDURE — 97112 NEUROMUSCULAR REEDUCATION: CPT

## 2020-07-24 NOTE — PROGRESS NOTES
PT DAILY TREATMENT NOTE 10-18    Patient Name: Virgilio Moncada  Date:2020  : 1935  [x]  Patient  Verified  Payor: [place] MEDICARE / Plan: [place] MEDICARE PART A & B / Product Type: Medicare /    In time:330  Out time:1028  Total Treatment Time (min): 58  Visit #: 5 of 8    Medicare/BCBS Only   Total Timed Codes (min):  58 1:1 Treatment Time:  48       Treatment Area: Low back pain [M54.5]    SUBJECTIVE  Pain Level (0-10 scale): 7  Any medication changes, allergies to medications, adverse drug reactions, diagnosis change, or new procedure performed?: [x] No    [] Yes (see summary sheet for update)  Subjective functional status/changes:   [] No changes reported  Pt reports manual therapy done at last session \"really helped\" but she woke up early in the morning with LBP radiating to right hip. OBJECTIVE    Modality rationale: decrease pain and increase tissue extensibility to improve the patients ability to tolerate ADLs. Min Type Additional Details   10 []  Ice     [x]  heat  []  Ice massage  []  Laser   []  Anodyne Position: seated  Location: right buttock. [] Skin assessment post-treatment:  []intact []redness- no adverse reaction    []redness - adverse reaction:      30 min Therapeutic Exercise:  [x] See flow sheet :   Rationale: increase ROM and increase strength to improve the patients ability to tolerate ADLs. 8 min Neuromuscular Re-education:  [x]  See flow sheet : posterior pelvic tilt with transverse abdominis activation exercises for abdominal stability. Rationale: increase strength, improve coordination and increase proprioception  to improve the patients ability to improve ease of ADLs. 10 min Manual Therapy:  S/L DTM to the piriformis and glute med, where a lot of TrPs noted. Rationale: decrease pain, increase ROM, increase tissue extensibility and decrease trigger points to improve tolerance to self care and sleep.            With   [] TE   [] TA   [] neuro   [] other: Patient Education: [x] Review HEP    [] Progressed/Changed HEP based on:   [] positioning   [] body mechanics   [] transfers   [] heat/ice application    [] other:      Other Objective/Functional Measures: improvement in pain post therapy     Pain Level (0-10 scale) post treatment: 0    ASSESSMENT/Changes in Function: Pt presents with continued increased pain today in the posterior hip that is \"unbearable. \" Following manual therapy and trigger point release, pt has pain relief. Pt re-instructed in piriformis stretching, which she notes she had not been doing at home. Good reports of no pain following therapy session. Will continue to progress stretching and strengthening exercises as able. Patient will continue to benefit from skilled PT services to modify and progress therapeutic interventions, address functional mobility deficits, address ROM deficits, address strength deficits, analyze and address soft tissue restrictions, analyze and cue movement patterns, analyze and modify body mechanics/ergonomics, assess and modify postural abnormalities and instruct in home and community integration to attain remaining goals. [x]  See Plan of Care  []  See progress note/recertification  []  See Discharge Summary         Progress towards goals / Updated goals:  Short Term Goals: To be accomplished in 2 weeks:  1. Pt will report daily compliance with HEP to maximize therapeutic success.              Eval: HEP assigned              Current: Met 7/17/20 Pt reports understanding and compliance. 2. Pt will demonstrate improved ease of transferring from supine to prone, showing improved flexibility for bed mobility.             Eval: requires therapist assistance due to increased difficulty and pain  Long Term Goals: To be accomplished in 4 weeks:  1. Pt will improve FOTO to 65, demonstrating improved functional capacity for ADLs.             Eval: 56  2.  Pt will improve B hip strength MMT to 4+/5 or better to improve ease of ADLs.              Eval: right hip flex/add 4-/5, abd/ext 4/5; left hip flex/abd 4/5, ext 3+/5, add 4-/5  3. Pt will improve B knee strength MMT to 4+/5 or better to improve ease of ADLs.             Eval: B knee ext 4/5, flex 4-/5  4. Pt will improve lumbar ROM to 75% or better without increased pain to improve ease of bed mobility.             Eval: ext 25% pain, right lat flex 50% pain, right rot 50%, left rot 25% pain  5. Pt will report decreased LBP to 3/10 or better to improve QOL.               Eval: 7/10    Current: remains, 7/10 today (7/24/2020)    PLAN  []  Upgrade activities as tolerated     [x]  Continue plan of care  []  Update interventions per flow sheet       []  Discharge due to:_  []  Other:_      Galina Peterson, PT 7/24/2020  3:34 PM    Future Appointments   Date Time Provider [facility]   7/28/2020 12:45 PM Tosha Valero, PT [facility]   7/31/2020 11:00 AM Tosha Valero, PT [facility]   8/4/2020 11:15 AM Tosha Valero, PT [facility]   8/7/2020 11:15 AM Tosha Valero, PT [facility]   8/17/2020 10:45 AM Michaela Garner  [address]   6/18/2021  1:00 PM Cassie Chapman DO [address] [place] [address]

## 2020-07-28 ENCOUNTER — HOSPITAL ENCOUNTER (OUTPATIENT)
Dept: PHYSICAL THERAPY | Age: 85
Discharge: HOME | End: 2020-07-28
Payer: MEDICARE

## 2020-07-28 PROCEDURE — 97110 THERAPEUTIC EXERCISES: CPT

## 2020-07-28 PROCEDURE — 97112 NEUROMUSCULAR REEDUCATION: CPT

## 2020-07-28 PROCEDURE — 97140 MANUAL THERAPY 1/> REGIONS: CPT

## 2020-07-28 NOTE — PROGRESS NOTES
PT DAILY TREATMENT NOTE 10-18    Patient Name: Farnaz Heard  Date:2020  : 1935  [x]  Patient  Verified  Payor: [place] MEDICARE / Plan: [place] MEDICARE PART A & B / Product Type: Medicare /    In time:1200  Out time:1255  Total Treatment Time (min): 55  Visit #: 6 of 8    Medicare/BCBS Only   Total Timed Codes (min):  55 1:1 Treatment Time:  42     Treatment Area: Low back pain [M54.5]    SUBJECTIVE  Pain Level (0-10 scale): 4  Any medication changes, allergies to medications, adverse drug reactions, diagnosis change, or new procedure performed?: [x] No    [] Yes (see summary sheet for update)  Subjective functional status/changes:   [] No changes reported  Pt reports decreased pain since the last PT session. OBJECTIVE    Modality rationale: decrease pain and increase tissue extensibility to improve the patients ability to perform pain free ADLs. Min Type Additional Details   10 []  Ice     [x]  heat  []  Ice massage  []  Laser   []  Anodyne Position: seated  Location:right buttock and low back   [] Skin assessment post-treatment:  []intact []redness- no adverse reaction    []redness - adverse reaction:   24 min Therapeutic Exercise:  [x] See flow sheet :   Rationale: increase ROM and increase strength to improve the patients ability to ambulate and perform self care tasks with reduced pain. 10 min Neuromuscular Re-education:  [x]  See flow sheet : transver abdominis isometric contractions with PPT exercise series   Rationale: increase strength and improve coordination  to improve the patients ability to stabilize with abdominals to improve ease of ADLs. 8 min Manual Therapy:  TPR to left piriformis and glute med. DTM/STM left piriformis and glute med   Rationale: decrease pain, increase ROM, increase tissue extensibility and decrease trigger points to improve ease of ADLs and self care.            With   [] TE   [] TA   [] neuro   [] other: Patient Education: [x] Review HEP    [] Progressed/Changed HEP based on:   [] positioning   [] body mechanics   [] transfers   [] heat/ice application    [] other:      Other Objective/Functional Measures: decreased pain     Pain Level (0-10 scale) post treatment: 0    ASSESSMENT/Changes in Function: Performed trigger point release to the left piriformis and glute med along 4 trigger points, educating pt that these muscles may feel sore tonight and tomorrow and that she may use heat for pain relief prn. Pt tolerates progressions in therapeutic exercises without c/o. Will re-assess goals at next visit. Patient will continue to benefit from skilled PT services to modify and progress therapeutic interventions, address functional mobility deficits, address ROM deficits, address strength deficits, analyze and address soft tissue restrictions, analyze and cue movement patterns, analyze and modify body mechanics/ergonomics, assess and modify postural abnormalities, address imbalance/dizziness and instruct in home and community integration to attain remaining goals. [x]  See Plan of Care  []  See progress note/recertification  []  See Discharge Summary         Progress towards goals / Updated goals:  Short Term Goals: To be accomplished in 2 weeks:  1. Pt will report daily compliance with HEP to maximize therapeutic success.              Eval: HEP assigned              Current: Met 7/17/20 Pt reports understanding and compliance. 2. Pt will demonstrate improved ease of transferring from supine to prone, showing improved flexibility for bed mobility.             Eval: requires therapist assistance due to increased difficulty and pain   Current: performs without assistance. (7/28/2020)  Long Term Goals: To be accomplished in 4 weeks:  1. Pt will improve FOTO to 65, demonstrating improved functional capacity for ADLs.             Eval: 56  2. Pt will improve B hip strength MMT to 4+/5 or better to improve ease of ADLs.               Eval: right hip flex/add 4-/5, abd/ext 4/5; left hip flex/abd 4/5, ext 3+/5, add 4-/5  3. Pt will improve B knee strength MMT to 4+/5 or better to improve ease of ADLs.             Eval: B knee ext 4/5, flex 4-/5  4. Pt will improve lumbar ROM to 75% or better without increased pain to improve ease of bed mobility.             Eval: ext 25% pain, right lat flex 50% pain, right rot 50%, left rot 25% pain  5. Pt will report decreased LBP to 3/10 or better to improve QOL.               Eval: 7/10                Current: progressing, 4/10 today (7/28/2020)    PLAN  [x]  Upgrade activities as tolerated     [x]  Continue plan of care  []  Update interventions per flow sheet       []  Discharge due to:_  []  Other:_      Cristobal Almaraz, PT 7/28/2020  12:07 PM    Future Appointments   Date Time Provider Delmer Haydeni   7/31/2020 11:00 [facility]   8/4/2020 11:15 AM Eulas Glassing, PT [facility]   8/7/2020 11:15 AM Eulas Glassing, PT [facility]   8/17/2020 10:45 AM Loi Bass  E [facility]   6/18/2021  1:00 PM Arsen Martínez DO [address] [place] [address]

## 2020-07-31 ENCOUNTER — HOSPITAL ENCOUNTER (OUTPATIENT)
Dept: PHYSICAL THERAPY | Age: 85
Discharge: HOME | End: 2020-07-31
Payer: MEDICARE

## 2020-07-31 PROCEDURE — 97112 NEUROMUSCULAR REEDUCATION: CPT

## 2020-07-31 PROCEDURE — 97140 MANUAL THERAPY 1/> REGIONS: CPT

## 2020-07-31 PROCEDURE — 97110 THERAPEUTIC EXERCISES: CPT

## 2020-07-31 NOTE — PROGRESS NOTES
PT DAILY TREATMENT NOTE 10-18    Patient Name: Rd Cost  Date:2020  : 1935  [x]  Patient  Verified  Payor: Zayra Slight / Plan: [place] MEDICARE PART A & B / Product Type: Medicare /    In time:11  Out time:1200  Total Treatment Time (min): 60  Visit #: 7 of 8    Medicare/BCBS Only   Total Timed Codes (min):  60 1:1 Treatment Time:  48       Treatment Area: Low back pain [M54.5]    SUBJECTIVE  Pain Level (0-10 scale): 2  Any medication changes, allergies to medications, adverse drug reactions, diagnosis change, or new procedure performed?: [x] No    [] Yes (see summary sheet for update)  Subjective functional status/changes:   [] No changes reported  Pt has been feeling better but woke up last night with pains in her buttock. OBJECTIVE    Modality rationale: decrease pain and increase tissue extensibility to improve the patients ability to tolerate ADLs. Min Type Additional Details   10 []  Ice     [x]  heat  []  Ice massage  []  Laser   []  Anodyne Position:  Location:   [] Skin assessment post-treatment:  []intact []redness- no adverse reaction    []redness - adverse reaction:      30 min Therapeutic Exercise:  [x] See flow sheet : exercises performed and progressed per flowsheet   Rationale: increase ROM and increase strength to improve the patients ability to perform pain free ADLs. 10 min Neuromuscular Re-education:  [x]  See flow sheet : PPT series, dead bug   Rationale: increase strength, improve coordination and increase proprioception  to improve the patients ability to perform ADLs with greater abdominal stability and decreased LBP. 8 min Manual Therapy:  Left S/L -- DTM and trigger point release to the right glute max,med, and piriformis   Rationale: decrease pain, increase ROM, increase tissue extensibility and decrease trigger points to improve comfort with sleep.            With   [x] TE   [] TA   [] neuro   [] other: Patient Education: [x] Review HEP    [] Progressed/Changed HEP based on:   [] positioning   [] body mechanics   [] transfers   [] heat/ice application    [] other:      Other Objective/Functional Measures: MMT B hips and knees grossly 4/5 B, improvement in pain with lumbar ROM     Pain Level (0-10 scale) post treatment: 0    ASSESSMENT/Changes in Function: Pt is progressing nicely with therapy and performs dead bug progression with correct form following therapist guidance. Pt advised to stretch at home following therapy and that her right buttocks may be sore today and tomorrow following the trigger point release. Will assess FOTO at next visit and progress core exercises. Patient will continue to benefit from skilled PT services to modify and progress therapeutic interventions, address functional mobility deficits, address ROM deficits, address strength deficits, analyze and address soft tissue restrictions, analyze and cue movement patterns, analyze and modify body mechanics/ergonomics, assess and modify postural abnormalities and instruct in home and community integration to attain remaining goals. [x]  See Plan of Care  []  See progress note/recertification  []  See Discharge Summary         Progress towards goals / Updated goals:  Short Term Goals: To be accomplished in 2 weeks:  1. Pt will report daily compliance with HEP to maximize therapeutic success.              Eval: HEP assigned              Current: Met 7/17/20 Pt reports understanding and compliance. 2. Pt will demonstrate improved ease of transferring from supine to prone, showing improved flexibility for bed mobility.             Eval: requires therapist assistance due to increased difficulty and pain              Current: performs without assistance. (7/28/2020)  Long Term Goals: To be accomplished in 4 weeks:  1. Pt will improve FOTO to 65, demonstrating improved functional capacity for ADLs.             Eval: 56  2.  Pt will improve B hip strength MMT to 4+/5 or better to improve ease of ADLs.             Eval: right hip flex/add 4-/5, abd/ext 4/5; left hip flex/abd 4/5, ext 3+/5, add 4-/5   Current: progressing, right hip flex 3+/5, abd 4/5, ext/add 5/5; left hip flex 4-/5, ext/abd 4/5, add 5/5 (7/31/2020)  3. Pt will improve B knee strength MMT to 4+/5 or better to improve ease of ADLs.             Eval: B knee ext 4/5, flex 4-/5   Current: progressing, B knee ext/flex 4/5 (7/31/2020)  4. Pt will improve lumbar ROM to 75% or better without increased pain to improve ease of bed mobility.             Eval: ext 25% pain, right lat flex 50% pain, right rot 50%, left rot 25% pain   Current: progressing, flexion full, ext 25% with discomfort, B lat flexion and rotation 50% pain free (7/31/2020)  5. Pt will report decreased LBP to 3/10 or better to improve QOL.               Eval: 7/10                Current: progressing, 4/10 today (7/28/2020)    PLAN  [x]  Upgrade activities as tolerated     [x]  Continue plan of care  []  Update interventions per flow sheet       []  Discharge due to:_  []  Other:_      Debby Keane, PT 7/31/2020  11:13 AM    Future Appointments   Date Time Provider Delmer Montano   8/4/2020 11:15 AM Ruben Jordan, PT [facility]   8/17/2020 10:45 AM Page Mayes  E 23[facility]   6/18/2021  1:00 PM Jerson James DO [address] [place] [address]

## 2020-08-04 ENCOUNTER — APPOINTMENT (OUTPATIENT)
Dept: PHYSICAL THERAPY | Age: 85
End: 2020-08-04

## 2020-08-07 ENCOUNTER — APPOINTMENT (OUTPATIENT)
Dept: PHYSICAL THERAPY | Age: 85
End: 2020-08-07

## 2020-08-17 ENCOUNTER — OFFICE VISIT (OUTPATIENT)
Dept: ORTHOPEDIC SURGERY | Age: 85
End: 2020-08-17

## 2020-08-17 VITALS
WEIGHT: 132.6 LBS | BODY MASS INDEX: 20.81 KG/M2 | RESPIRATION RATE: 14 BRPM | HEART RATE: 66 BPM | HEIGHT: 67 IN | OXYGEN SATURATION: 96 % | TEMPERATURE: 97.6 F | DIASTOLIC BLOOD PRESSURE: 69 MMHG | SYSTOLIC BLOOD PRESSURE: 115 MMHG

## 2020-08-17 DIAGNOSIS — M51.36 DDD (DEGENERATIVE DISC DISEASE), LUMBAR: ICD-10-CM

## 2020-08-17 DIAGNOSIS — M54.50 LOW BACK PAIN, UNSPECIFIED BACK PAIN LATERALITY, UNSPECIFIED CHRONICITY, UNSPECIFIED WHETHER SCIATICA PRESENT: ICD-10-CM

## 2020-08-17 DIAGNOSIS — M47.816 LUMBAR FACET ARTHROPATHY: Primary | ICD-10-CM

## 2020-08-17 NOTE — PATIENT INSTRUCTIONS
Low Back Arthritis: Exercises  Introduction  Here are some examples of typical rehabilitation exercises for your condition. Start each exercise slowly. Ease off the exercise if you start to have pain. Your doctor or physical therapist will tell you when you can start these exercises and which ones will work best for you. When you are not being active, find a comfortable position for rest. Some people are comfortable on the floor or a medium-firm bed with a small pillow under their head and another under their knees. Some people prefer to lie on their side with a pillow between their knees. Don't stay in one position for too long. Take short walks (10 to 20 minutes) every 2 to 3 hours. Avoid slopes, hills, and stairs until you feel better. Walk only distances you can manage without pain, especially leg pain. How to do the exercises  Pelvic tilt   1. Lie on your back with your knees bent. 2. \"Brace\" your stomach--tighten your muscles by pulling in and imagining your belly button moving toward your spine. 3. Press your lower back into the floor. You should feel your hips and pelvis rock back. 4. Hold for 6 seconds while breathing smoothly. 5. Relax and allow your pelvis and hips to rock forward. 6. Repeat 8 to 12 times. Back stretches   1. Get down on your hands and knees on the floor. 2. Relax your head and allow it to droop. Round your back up toward the ceiling until you feel a nice stretch in your upper, middle, and lower back. Hold this stretch for as long as it feels comfortable, or about 15 to 30 seconds. 3. Return to the starting position with a flat back while you are on your hands and knees. 4. Let your back sway by pressing your stomach toward the floor. Lift your buttocks toward the ceiling. 5. Hold this position for 15 to 30 seconds. 6. Repeat 2 to 4 times. Follow-up care is a key part of your treatment and safety.  Be sure to make and go to all appointments, and call your doctor if you are having problems. It's also a good idea to know your test results and keep a list of the medicines you take. Where can you learn more? Go to http://dutch-osvaldo.info/  Enter T094 in the search box to learn more about \"Low Back Arthritis: Exercises. \"  Current as of: March 2, 2020               Content Version: 12.5  © 7744-2815 [employer], [employer] Care instructions adapted under license by [employer] (which disclaims liability or warranty for this information). If you have questions about a medical condition or this instruction, always ask your healthcare professional. [place] [address] any warranty or liability for your use of this information.

## 2020-08-17 NOTE — PROGRESS NOTES
[facility]  [address] [place], [facility] [address]  Phone: [phone]  Fax: [phone]    Pt's YOB: 1935    ASSESSMENT   Diagnoses and all orders for this visit:    1. Lumbar facet arthropathy  -     AMB SUPPLY ORDER    2. DDD (degenerative disc disease), lumbar  -     AMB SUPPLY ORDER    3. Low back pain, unspecified back pain laterality, unspecified chronicity, unspecified whether sciatica present  -     AMB SUPPLY ORDER         IMPRESSION AND PLAN:  Addy Gorman is a 82 y.o. female with history of lumbar pain. Pt complains of aching, throbbing pain in the lower back, particularly when laying down for extended periods of time. She reports improvement in the pain radiating down her left leg pain since attending physical therapy. Pt takes Motrin 200 mg 2 tabs intermittently as needed with benefit. 1) Pt was given information on lumbar arthritis exercises. 2) She will continue taking OTC Motrin intermittently as needed. 3) Pt was prescribed a lumbar support. 4) Ms. Rell Murdock has a reminder for a \"due or due soon\" health maintenance. I have asked that she contact her primary care provider, Monica Barton MD, for follow-up on this health maintenance. 5)  demonstrated consistency with prescribing. 6) Recommend lumbar MRI if symptoms worsen / persist  Follow-up and Dispositions    · Return in about 6 weeks (around 9/28/2020) for PT follow up. HISTORY OF PRESENT ILLNESS:  Addy Gorman is a 82 y.o. female with history of lumbar pain and presents to the office today for follow up. Pt complains of aching, throbbing pain in the lower back, particularly when laying down for extended periods of time. She notes stiffness when getting out of bed in the morning, which improves with activity. Pt denies any pain radiating down the legs at this time.  She reports improvement in the pain radiating down her left leg pain since attending physical therapy since her last office visit. Pt notes that she never filled the Medrol Dosepak prescribed at her last office visit. She takes OTC Motrin 200 mg 2 tabs intermittently as needed with benefit. Pt denies any stomach upset with the Motrin. She reports that she never received a lumbar support as previously ordered by Dr. Dillon Wu (now retired). Pt at this time desires to proceed with a lumbar support. Pain Scale: 5/10    PCP: Alin Guajardo MD     Past Medical History:   Diagnosis Date    Abnormal weight gain     Arthritis     Atrial fibrillation (HCC)     Cardiac arrhythmia     Chronic low back pain without sciatica     Drowsiness     Feels cold     History of artificial joint     Knee joint replacement status, right     Macrocytosis without anemia     Mitral valve disorder     MVP (mitral valve prolapse)     Osteopenia     PAF (paroxysmal atrial fibrillation) ([facility])     single episode age 70 due to scopolamine patch    PONV (postoperative nausea and vomiting)     no scopolamine patch    Recurrent UTI     Urethral caruncle     Urge incontinence of urine     Urge urinary incontinence     Urinary incontinence     unspec.          Social History     Socioeconomic History    Marital status:      Spouse name: Not on file    Number of children: Not on file    Years of education: Not on file    Highest education level: Not on file   Occupational History    Not on file   Social Needs    Financial resource strain: Not on file    Food insecurity     Worry: Not on file     Inability: Not on file    Transportation needs     Medical: Not on file     Non-medical: Not on file   Tobacco Use    Smoking status: Never Smoker    Smokeless tobacco: Never Used   Substance and Sexual Activity    Alcohol use: No    Drug use: No    Sexual activity: Never   Lifestyle    Physical activity     Days per week: Not on file     Minutes per session: Not on file    Stress: Not on file   Relationships    Social connections     Talks on phone: Not on file     Gets together: Not on file     Attends Sabianist service: Not on file     Active member of club or organization: Not on file     Attends meetings of clubs or organizations: Not on file     Relationship status: Not on file    Intimate partner violence     Fear of current or ex partner: Not on file     Emotionally abused: Not on file     Physically abused: Not on file     Forced sexual activity: Not on file   Other Topics Concern    Not on file   Social History Narrative    Not on file       Current Outpatient Medications   Medication Sig Dispense Refill    cyanocobalamin (Vitamin B-12) 1,000 mcg/mL injection Inject 1 ml IM monthly 3 Vial 4    cephALEXin (KEFLEX) 250 mg capsule Take 250 mg by mouth daily.  calcium-cholecalciferol, d3, (CALCIUM 600 + D) 600-125 mg-unit tab Take 1 Tab by mouth daily.  Syringe with Needle, Safety 3 mL 25 gauge x 5/8\" syrg Use with B12 15 Pen Needle 1    glucosamine sulfate 750 mg tab 750 mg.      fish oil-omega-3 fatty acids 340-1,000 mg capsule 2,000 mg.      B.infantis-B.ani-B.long-B.bifi (PROBIOTIC 4X) 10-15 mg TbEC Take 1 Cap by mouth daily.  ascorbic acid (VITAMIN C) 1,000 mg tablet Take 1,000 mg by mouth daily.  estradiol (ESTRACE) 0.01 % (0.1 mg/gram) vaginal cream Apply small amount to urethral  Opening daily 85 g 3    OTHER 2 Tabs two (2) times a day. Macular Protect Complete vitamin      aspirin 81 mg tablet Take 81 mg by mouth daily.  folic acid 507 mcg tablet Take 400 mcg by mouth daily.  ASCORBIC ACID/VITAMIN E (CRANBERRY CONCENTRATE PO) Take 1 Cap by mouth two (2) times a day.          Allergies   Allergen Reactions    Latex Anaphylaxis    Ampicillin Hives    Codeine Nausea and Vomiting    Doxycycline Hives     States can take with benadryl    Macrodantin [Nitrofurantoin Macrocrystalline] Hives    Other Medication Hives     Thallium dyes    Pcn [Penicillins] Hives     Other reaction(s): mild rash/itching    Sulfa (Sulfonamide Antibiotics) Hives     Other reaction(s): mild rash/itching    Tetracycline Hives    Formaldehyde Rash    Macrobid [Nitrofurantoin Monohyd/M-Cryst] Rash    Methenamine Hippurate Rash    Scopolamine Other (comments)     Other reaction(s): cardiac dysrhythmia  Patient reports A-Fib arrythmia    Thallium-201 Rash         REVIEW OF SYSTEMS    Constitutional: Negative for fever, chills, or weight change. Respiratory: Negative for cough or shortness of breath. Cardiovascular: Negative for chest pain or palpitations. Gastrointestinal: Negative for acid reflux, change in bowel habits, or constipation. Genitourinary: Negative for dysuria and flank pain. Musculoskeletal: Positive for lumbar pain. Neurological: Negative for headaches, dizziness, or numbness. Psychiatric/Behavioral: Negative for difficulty with sleep. As per HPI      PHYSICAL EXAMINATION  Visit Vitals  /69   Pulse 66   Temp 97.6 °F (36.4 °C) (Oral)   Resp 14   Ht 5' 7\" (1.702 m)   Wt 132 lb 9.6 oz (60.1 kg)   SpO2 96%   BMI 20.77 kg/m²       Constitutional: Awake, alert, and in no acute distress. Neurological: 1+ symmetrical DTRs in the upper extremities. 1+ symmetrical DTRs in the lower extremities. Sensation to light touch is intact. Negative Lenz's sign bilaterally. Skin: warm, dry, and intact. Musculoskeletal: Tenderness to palpation in the lower lumbar region. Minimal tenderness to palpation over the right sacroiliac joint. Moderate pain with extension and axial loading. Improvement with forward flexion. No pain with internal or external rotation of her hips. Negative straight leg raise bilaterally.      Hip Flex  Quads Hamstrings Ankle DF EHL Ankle PF   Right +4/5 +4/5 +4/5 +4/5 +4/5 +4/5   Left +4/5 +4/5 +4/5 +4/5 +4/5 +4/5     IMAGING:    Lumbar spine 4V x-rays from 06/29/2020 were personally reviewed with the patient and demonstrated:  Dextroscoliosis. Straightening of the lumbar spine. Severe degenerative disc at L5-S1 and L3-4. Fusion at L4-5. No instability. Mild atherosclerosis. Written by Judit Estrella, as dictated by Macario Kingsley MD.  I, Dr. Macario Kingsley confirm that all documentation is accurate.

## 2020-08-17 NOTE — LETTER
8/18/20 Patient: Valorie Bender YOB: 1935 Date of Visit: 8/17/2020 Joceline Ruano MD 
[address] [facility] [address] [place] [address] VIA In Basket Dear Joceline Ruano MD, Thank you for referring Ms. Nidia Goodwin to [facility] for evaluation. My notes for this consultation are attached. If you have questions, please do not hesitate to call me. I look forward to following your patient along with you. Sincerely, Lashawn Song MD

## 2020-08-19 DIAGNOSIS — R68.89 OTHER GENERAL SYMPTOMS AND SIGNS: ICD-10-CM

## 2020-08-19 DIAGNOSIS — D75.89 MACROCYTOSIS WITHOUT ANEMIA: Primary | ICD-10-CM

## 2020-08-19 NOTE — TELEPHONE ENCOUNTER
Due for virtual visit with labs prior before she runs out of her B12 vials at home. [facility] same day.  Thank you, ELEANOR

## 2020-08-19 NOTE — TELEPHONE ENCOUNTER
Patient asking for refills on her syringes to administer B12. Order pended please review and sign if appropriate.

## 2020-08-19 NOTE — TELEPHONE ENCOUNTER
Ms. France Push called stating that she needs refills on her syringes. She said the syringes are 3ML  25 Gauge 5/8LL. She stated \"It would be greatly appreciated if the doctor could put in refills so I don't have to keep calling every month. Her call back number is: [phone].   She uses [facility].

## 2020-08-25 NOTE — TELEPHONE ENCOUNTER
Called patient ESTEBAN verified she has been scheduled for her follow up on 20 and will have lab draw and blood pressure check on 2020 in office.

## 2020-08-26 NOTE — PROGRESS NOTES
[facility]  [address]  [place], [address]  [phone] fax    Physical Therapy Discharge Summary    Patient name: Haider Guy Start of Care: 7/10/2020   Referral source: Loi Bass MD : [id number]   Medical/Treatment Diagnosis: Low back pain [M54.5]  Payor: Fifi Calvert / Plan: [place] MEDICARE PART A & B / Product Type: Medicare /  Onset Date:chronic, progressive increase over two years     Prior Hospitalization: see medical history Provider#: [id number]   Medications: Verified on Patient Summary List    Comorbidities: arthritis, stress incontinence   Prior Level of Function: manages ADLs with pain  Visits from Start of Care: 7    Missed Visits: 0  Reporting Period : 7/10/2020 to 2020    Summary of Care:  Short Term Goals: To be accomplished in 2 weeks:  1. Pt will report daily compliance with HEP to maximize therapeutic success.              Eval: HEP assigned              Current: Met, Pt reports understanding and compliance. 2. Pt will demonstrate improved ease of transferring from supine to prone, showing improved flexibility for bed mobility.             Eval: requires therapist assistance due to increased difficulty and pain              Current: met, performs without assistance. Long Term Goals: To be accomplished in 4 weeks:  1. Pt will improve FOTO to 65, demonstrating improved functional capacity for ADLs.             Eval: 56   Current: not met/remains, pt self d/c before progress note   2. Pt will improve B hip strength MMT to 4+/5 or better to improve ease of ADLs.             Eval: right hip flex/add 4-/5, abd/ext 4/5; left hip flex/abd 4/5, ext 3+/5, add 4-/5              Current: not met/progressing, right hip flex 3+/5, abd 4/5, ext/add 5/5; left hip flex 4-/5, ext/abd 4/5, add 5/5   3. Pt will improve B knee strength MMT to 4+/5 or better to improve ease of ADLs.               Eval: B knee ext 4/5, flex 4-/5 Current: not met/progressing, B knee ext/flex 4/5   4. Pt will improve lumbar ROM to 75% or better without increased pain to improve ease of bed mobility.             Eval: ext 25% pain, right lat flex 50% pain, right rot 50%, left rot 25% pain              Current: not met/progressing, flexion full, ext 25% with discomfort, B lat flexion and rotation 50% pain free   5. Pt will report decreased LBP to 3/10 or better to improve QOL.             Eval: 7/10                Current: near met/progressing, 2-4/10 in last two visits      ASSESSMENT/RECOMMENDATIONS:  Pt was progressing very well with therapy and was advised at her last visit to continue stretching daily to assist with her gluteal trigger points. Pt self discharged via telephone call as she was unable to schedule her preferred time. Pt advised to continue with her HEP.   [x]Discontinue therapy: []Patient has reached or is progressing toward set goals      [x]Patient is non-compliant or has abdicated      []Due to lack of appreciable progress towards set goals    Elise Gates, PT 8/26/2020 2:33 PM

## 2020-09-08 ENCOUNTER — TELEPHONE (OUTPATIENT)
Dept: FAMILY MEDICINE CLINIC | Age: 85
End: 2020-09-08

## 2020-09-08 ENCOUNTER — HOSPITAL ENCOUNTER (OUTPATIENT)
Dept: LAB | Age: 85
Discharge: HOME | End: 2020-09-08
Payer: MEDICARE

## 2020-09-08 ENCOUNTER — CLINICAL SUPPORT (OUTPATIENT)
Dept: FAMILY MEDICINE CLINIC | Age: 85
End: 2020-09-08

## 2020-09-08 VITALS — DIASTOLIC BLOOD PRESSURE: 84 MMHG | TEMPERATURE: 97.9 F | SYSTOLIC BLOOD PRESSURE: 116 MMHG

## 2020-09-08 DIAGNOSIS — D75.89 MACROCYTOSIS WITHOUT ANEMIA: ICD-10-CM

## 2020-09-08 DIAGNOSIS — D75.89 MACROCYTOSIS WITHOUT ANEMIA: Primary | ICD-10-CM

## 2020-09-08 DIAGNOSIS — R68.89 OTHER GENERAL SYMPTOMS AND SIGNS: ICD-10-CM

## 2020-09-08 LAB
ALBUMIN SERPL-MCNC: 3.6 G/DL (ref 3.4–5)
ALBUMIN/GLOB SERPL: 1.1 {RATIO} (ref 0.8–1.7)
ALP SERPL-CCNC: 75 U/L (ref 45–117)
ALT SERPL-CCNC: 37 U/L (ref 13–56)
ANION GAP SERPL CALC-SCNC: 4 MMOL/L (ref 3–18)
AST SERPL-CCNC: 37 U/L (ref 10–38)
BASOPHILS # BLD: 0 K/UL (ref 0–0.1)
BASOPHILS NFR BLD: 1 % (ref 0–2)
BILIRUB SERPL-MCNC: 0.5 MG/DL (ref 0.2–1)
BUN SERPL-MCNC: 30 MG/DL (ref 7–18)
BUN/CREAT SERPL: 28 (ref 12–20)
CALCIUM SERPL-MCNC: 9.1 MG/DL (ref 8.5–10.1)
CHLORIDE SERPL-SCNC: 103 MMOL/L (ref 100–111)
CO2 SERPL-SCNC: 30 MMOL/L (ref 21–32)
CREAT SERPL-MCNC: 1.07 MG/DL (ref 0.6–1.3)
DIFFERENTIAL METHOD BLD: ABNORMAL
EOSINOPHIL # BLD: 0.1 K/UL (ref 0–0.4)
EOSINOPHIL NFR BLD: 1 % (ref 0–5)
ERYTHROCYTE [DISTWIDTH] IN BLOOD BY AUTOMATED COUNT: 13.8 % (ref 11.6–14.5)
FOLATE SERPL-MCNC: >20 NG/ML (ref 3.1–17.5)
GLOBULIN SER CALC-MCNC: 3.2 G/DL (ref 2–4)
GLUCOSE SERPL-MCNC: 79 MG/DL (ref 74–99)
HCT VFR BLD AUTO: 39.2 % (ref 35–45)
HGB BLD-MCNC: 12.7 G/DL (ref 12–16)
LYMPHOCYTES # BLD: 1.2 K/UL (ref 0.9–3.6)
LYMPHOCYTES NFR BLD: 27 % (ref 21–52)
MCH RBC QN AUTO: 31.1 PG (ref 24–34)
MCHC RBC AUTO-ENTMCNC: 32.4 G/DL (ref 31–37)
MCV RBC AUTO: 96.1 FL (ref 74–97)
MONOCYTES # BLD: 0.5 K/UL (ref 0.05–1.2)
MONOCYTES NFR BLD: 11 % (ref 3–10)
NEUTS SEG # BLD: 2.6 K/UL (ref 1.8–8)
NEUTS SEG NFR BLD: 60 % (ref 40–73)
PLATELET # BLD AUTO: 205 K/UL (ref 135–420)
PMV BLD AUTO: 11.4 FL (ref 9.2–11.8)
POTASSIUM SERPL-SCNC: 4.3 MMOL/L (ref 3.5–5.5)
PROT SERPL-MCNC: 6.8 G/DL (ref 6.4–8.2)
RBC # BLD AUTO: 4.08 M/UL (ref 4.2–5.3)
SODIUM SERPL-SCNC: 137 MMOL/L (ref 136–145)
VIT B12 SERPL-MCNC: 1751 PG/ML (ref 211–911)
WBC # BLD AUTO: 4.3 K/UL (ref 4.6–13.2)

## 2020-09-08 PROCEDURE — 82607 VITAMIN B-12: CPT

## 2020-09-08 PROCEDURE — 80053 COMPREHEN METABOLIC PANEL: CPT

## 2020-09-08 PROCEDURE — 85025 COMPLETE CBC W/AUTO DIFF WBC: CPT

## 2020-09-08 PROCEDURE — 36415 COLL VENOUS BLD VENIPUNCTURE: CPT

## 2020-09-08 NOTE — PROGRESS NOTES
Patient here for NV blood pressure check and lab draw, COVID screen completed by front office staff, temperature taken before patient entered the office, wnl. Name and  verified blood pressure checked manually. Venipuncture performed on patients left am was successful patient tolerated well.

## 2020-09-08 NOTE — TELEPHONE ENCOUNTER
Have you been diagnosed with, tested for, or told that you are suspected of having COVID-19 (coronovirus)? No   Have you had a fever or taken medication to treat a fever in the past 72 hours? No  Have you had a cough, SOB, or flu-like symptoms within the past 3 days? No  Have you had direct contact with someone who tested positive for COVID-19 within the past 14 days? No  Do you have a household member with flu-like symptoms, including fever, cough, or SOB? NoDo you currently have flu-like symptoms including fever, cough, or SOB?  No

## 2020-09-09 ENCOUNTER — TELEPHONE (OUTPATIENT)
Dept: FAMILY MEDICINE CLINIC | Age: 85
End: 2020-09-09

## 2020-09-09 NOTE — TELEPHONE ENCOUNTER
lab called to let us know they did not receive a urine sample yesterday. Allie Tipton was aware, the patient was unable to give one yesterday.

## 2020-09-18 ENCOUNTER — VIRTUAL VISIT (OUTPATIENT)
Dept: FAMILY MEDICINE CLINIC | Age: 85
End: 2020-09-18

## 2020-09-18 DIAGNOSIS — Z00.00 MEDICARE ANNUAL WELLNESS VISIT, SUBSEQUENT: Primary | ICD-10-CM

## 2020-09-18 DIAGNOSIS — Z71.89 ADVANCED DIRECTIVES, COUNSELING/DISCUSSION: ICD-10-CM

## 2020-09-18 DIAGNOSIS — Z12.31 ENCOUNTER FOR SCREENING MAMMOGRAM FOR MALIGNANT NEOPLASM OF BREAST: ICD-10-CM

## 2020-09-18 DIAGNOSIS — E53.8 VITAMIN B12 DEFICIENCY: Primary | ICD-10-CM

## 2020-09-18 DIAGNOSIS — N39.46 MIXED STRESS AND URGE URINARY INCONTINENCE: ICD-10-CM

## 2020-09-18 DIAGNOSIS — I47.1 PAROXYSMAL SVT (SUPRAVENTRICULAR TACHYCARDIA) (HCC): ICD-10-CM

## 2020-09-18 NOTE — ACP (ADVANCE CARE PLANNING)
Advance Care Planning       Advance Care Planning (ACP) Physician/NP/PA (Provider) Conversation        Date of ACP Conversation: 9/18/2020    Conversation Conducted with:   Patient with Decision Making [address]  [facility] Decision Maker:    Current Designated Health Care Decision Maker:   Primary Decision Maker: Kelvin Hardwick - Child - [phone]    Secondary Decision Maker: Joselo Sandoval - Daughter    Has ACP documents that she is not willing to provide. \"My daughter, who is a nurse, told me NOT to share them. \" States she had seen care preferences disregarded. \"I don't want to be put on a ventilator. \"  Wants her children to make the decision about whether or not to engage in cardiac resuscitation. Has had conversations with them. \"If I was so sick that I would not be able to help myself, let me go. \"  Not prepared to made decision regarding hospitalization preferences. Conversation Outcomes / Follow-Up Plan:   explained rationale behind providing copy of ACP documents. Invited her to reconsider.   Will provide Tamia's phone number    Length of Voluntary ACP Conversation in minutes:  16 minutes      Joceline Ruano MD

## 2020-09-18 NOTE — PROGRESS NOTES
This is the Subsequent Medicare Annual Wellness Exam, performed 12 months or more after the Initial AWV or the last Subsequent AWV    I have reviewed the patient's medical history in detail and updated the computerized patient record. History     States prior therapy with Fosamax led to loss of 2 molars. Not interested in pursuing bone density imaging or tx. 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     Past Medical History:   Diagnosis Date    Abnormal weight gain     Arthritis     Atrial fibrillation (HCC)     Cardiac arrhythmia     Chronic low back pain without sciatica     Drowsiness     Feels cold     History of artificial joint     Knee joint replacement status, right     Macrocytosis without anemia     Mitral valve disorder     MVP (mitral valve prolapse)     Osteopenia     PAF (paroxysmal atrial fibrillation) (HCC)     single episode age 70 due to scopolamine patch    PONV (postoperative nausea and vomiting)     no scopolamine patch    Recurrent UTI     Urethral caruncle     Urge incontinence of urine     Urge urinary incontinence     Urinary incontinence     unspec.        Past Surgical History:   Procedure Laterality Date    HX BLADDER SUSPENSION      HX CATARACT REMOVAL Bilateral     HX HYSTERECTOMY      fibroids    HX OOPHORECTOMY Bilateral     benign mass    TOTAL KNEE ARTHROPLASTY Right      Current Outpatient Medications   Medication Sig Dispense Refill    Syringe with Needle, Safety 3 mL 25 gauge x 5/8\" syrg Use with B12 15 Pen Needle 1    cyanocobalamin (Vitamin B-12) 1,000 mcg/mL injection Inject 1 ml IM monthly 3 Vial 4  cephALEXin (KEFLEX) 250 mg capsule Take 250 mg by mouth daily.  calcium-cholecalciferol, d3, (CALCIUM 600 + D) 600-125 mg-unit tab Take 1 Tab by mouth daily.  glucosamine sulfate 750 mg tab 750 mg.      fish oil-omega-3 fatty acids 340-1,000 mg capsule 2,000 mg.      B.infantis-B.ani-B.long-B.bifi (PROBIOTIC 4X) 10-15 mg TbEC Take 1 Cap by mouth daily.  ascorbic acid (VITAMIN C) 1,000 mg tablet Take 1,000 mg by mouth daily.  estradiol (ESTRACE) 0.01 % (0.1 mg/gram) vaginal cream Apply small amount to urethral  Opening daily 85 g 3    OTHER 2 Tabs two (2) times a day. Macular Protect Complete vitamin      aspirin 81 mg tablet Take 81 mg by mouth daily.  folic acid 494 mcg tablet Take 400 mcg by mouth daily.  ASCORBIC ACID/VITAMIN E (CRANBERRY CONCENTRATE PO) Take 1 Cap by mouth two (2) times a day.        Allergies   Allergen Reactions    Latex Anaphylaxis    Ampicillin Hives    Codeine Nausea and Vomiting    Doxycycline Hives     States can take with benadryl    Macrodantin [Nitrofurantoin Macrocrystalline] Hives    Other Medication Hives     Thallium dyes    Pcn [Penicillins] Hives     Other reaction(s): mild rash/itching    Sulfa (Sulfonamide Antibiotics) Hives     Other reaction(s): mild rash/itching    Tetracycline Hives    Formaldehyde Rash    Macrobid [Nitrofurantoin Monohyd/M-Cryst] Rash    Methenamine Hippurate Rash    Scopolamine Other (comments)     Other reaction(s): cardiac dysrhythmia  Patient reports A-Fib arrythmia    Thallium-201 Rash       Family History   Problem Relation Age of Onset    Cancer Mother         Gastric     Heart Disease Father      Social History     Tobacco Use    Smoking status: Never Smoker    Smokeless tobacco: Never Used   Substance Use Topics    Alcohol use: No       Depression Risk Factor Screening:     3 most recent PHQ Screens 8/17/2020   Little interest or pleasure in doing things Not at all   Feeling down, depressed, irritable, or hopeless Not at all   Total Score PHQ 2 0       Alcohol Risk Screen   Do you average more than 1 drink per night or more than 7 drinks a week:  No    On any one occasion in the past three months have you have had more than 3 drinks containing alcohol:  No        Functional Ability and Level of Safety:   Hearing: Hearing is good. Activities of Daily Living: The home contains: handrails, grab bars and Has shower chair  Patient does total self care     Ambulation: with no difficulty     Fall Risk:  Fall Risk Assessment, last 12 mths 9/18/2020   Able to walk? Yes   Fall in past 12 months? No     Abuse Screen:  Patient is not abused       Cognitive Screening   Has your family/caregiver stated any concerns about your memory: no    Cognitive Screening: Normal - obs    Patient Care Team   Patient Care Team:  Alin Guajardo MD as PCP - General (Family Medicine)  Alin Guajardo MD as PCP - [facility] Empaneled Provider  Kenzie Yousif MD (Obstetrics & Gynecology)    Assessment/Plan   Education and counseling provided:  Has [facility] documents    Diagnoses and all orders for this visit:    1. Medicare annual wellness visit, subsequent    2. Encounter for screening mammogram for malignant neoplasm of breast  -     JUAN CARLOS MAMMO BI SCREENING INCL CAD; Future      Plans mmg through age 82  Plans Flu shot through [facility] Maintenance Due   Topic Date Due    Shingrix Vaccine Age 49> (1 of 2) 10/27/1987    GLAUCOMA SCREENING Q2Y  10/27/2002    Medicare Yearly Exam  08/06/2020    Flu Vaccine (1) 09/01/2020       Claudette Maltos Redden, who was evaluated through a synchronous (real-time) audio-video encounter, and/or her healthcare decision maker, is aware that it is a billable service, with coverage as determined by her insurance carrier. She provided verbal consent to proceed: Yes, and patient identification was verified.  It was conducted pursuant to the emergency declaration under the [address] and the National Emergencies Act, [facility] authority and the [employer] and [employer] General Act. A caregiver was present when appropriate. Ability to conduct physical exam was limited. I was at home. The patient was at home.     Irina Bush MD

## 2020-09-18 NOTE — PATIENT INSTRUCTIONS
Medicare Wellness Visit, Female The best way to live healthy is to have a lifestyle where you eat a well-balanced diet, exercise regularly, limit alcohol use, and quit all forms of tobacco/nicotine, if applicable. Regular preventive services are another way to keep healthy. Preventive services (vaccines, screening tests, monitoring & exams) can help personalize your care plan, which helps you manage your own care. Screening tests can find health problems at the earliest stages, when they are easiest to treat. Clar[facility] follows the current, evidence-based guidelines published by the [facility] Cyrus Manzo ([facility]) when recommending preventive services for our patients. Because we follow these guidelines, sometimes recommendations change over time as research supports it. (For example, mammograms used to be recommended annually. Even though Medicare will still pay for an annual mammogram, the newer guidelines recommend a mammogram every two years for women of average risk). Of course, you and your doctor may decide to screen more often for some diseases, based on your risk and your co-morbidities (chronic disease you are already diagnosed with). Preventive services for you include: - Medicare offers their members a free annual wellness visit, which is time for you and your primary care provider to discuss and plan for your preventive service needs. Take advantage of this benefit every year! 
-All adults over the age of 72 should receive the recommended pneumonia vaccines. Current USPSTF guidelines recommend a series of two vaccines for the best pneumonia protection.  
-All adults should have a flu vaccine yearly and a tetanus vaccine every 10 years.  
-All adults age 48 and older should receive the shingles vaccines (series of two vaccines). -All adults age 38-68 who are overweight should have a diabetes screening test once every three years. -All adults born between 80 and 1965 should be screened once for Hepatitis C. 
-Other screening tests and preventive services for persons with diabetes include: an eye exam to screen for diabetic retinopathy, a kidney function test, a foot exam, and stricter control over your cholesterol.  
-Cardiovascular screening for adults with routine risk involves an electrocardiogram (ECG) at intervals determined by your doctor.  
-Colorectal cancer screenings should be done for adults age 54-65 with no increased risk factors for colorectal cancer. There are a number of acceptable methods of screening for this type of cancer. Each test has its own benefits and drawbacks. Discuss with your doctor what is most appropriate for you during your annual wellness visit. The different tests include: colonoscopy (considered the best screening method), a fecal occult blood test, a fecal DNA test, and sigmoidoscopy. 
 
-A bone mass density test is recommended when a woman turns 65 to screen for osteoporosis. This test is only recommended one time, as a screening. Some providers will use this same test as a disease monitoring tool if you already have osteoporosis. -Breast cancer screenings are recommended every other year for women of normal risk, age 54-69. 
-Cervical cancer screenings for women over age 72 are only recommended with certain risk factors. Here is a list of your current Health Maintenance items (your personalized list of preventive services) with a due date: 
Health Maintenance Due Topic Date Due  Shingles Vaccine (1 of 2) 10/27/1987  Glaucoma Screening   10/27/2002 Trisha Mg Annual Well Visit  08/06/2020  Yearly Flu Vaccine (1) 09/01/2020 Medicare Wellness Visit, Female The best way to live healthy is to have a lifestyle where you eat a well-balanced diet, exercise regularly, limit alcohol use, and quit all forms of tobacco/nicotine, if applicable. Regular preventive services are another way to keep healthy. Preventive services (vaccines, screening tests, monitoring & exams) can help personalize your care plan, which helps you manage your own care. Screening tests can find health problems at the earliest stages, when they are easiest to treat. Denny follows the current, evidence-based guidelines published by the [facility] Cyrus Manzo ([facility]) when recommending preventive services for our patients. Because we follow these guidelines, sometimes recommendations change over time as research supports it. (For example, mammograms used to be recommended annually. Even though Medicare will still pay for an annual mammogram, the newer guidelines recommend a mammogram every two years for women of average risk). Of course, you and your doctor may decide to screen more often for some diseases, based on your risk and your co-morbidities (chronic disease you are already diagnosed with). Preventive services for you include: - Medicare offers their members a free annual wellness visit, which is time for you and your primary care provider to discuss and plan for your preventive service needs. Take advantage of this benefit every year! 
-All adults over the age of 72 should receive the recommended pneumonia vaccines. Current USPSTF guidelines recommend a series of two vaccines for the best pneumonia protection.  
-All adults should have a flu vaccine yearly and a tetanus vaccine every 10 years.  
-All adults age 48 and older should receive the shingles vaccines (series of two vaccines).      
-All adults age 38-68 who are overweight should have a diabetes screening test once every three years.  
-All adults born between 80 and 1965 should be screened once for Hepatitis C. 
-Other screening tests and preventive services for persons with diabetes include: an eye exam to screen for diabetic retinopathy, a kidney function test, a foot exam, and stricter control over your cholesterol.  
-Cardiovascular screening for adults with routine risk involves an electrocardiogram (ECG) at intervals determined by your doctor.  
-Colorectal cancer screenings should be done for adults age 54-65 with no increased risk factors for colorectal cancer. There are a number of acceptable methods of screening for this type of cancer. Each test has its own benefits and drawbacks. Discuss with your doctor what is most appropriate for you during your annual wellness visit. The different tests include: colonoscopy (considered the best screening method), a fecal occult blood test, a fecal DNA test, and sigmoidoscopy. 
 
-A bone mass density test is recommended when a woman turns 65 to screen for osteoporosis. This test is only recommended one time, as a screening. Some providers will use this same test as a disease monitoring tool if you already have osteoporosis. -Breast cancer screenings are recommended every other year for women of normal risk, age 54-69. 
-Cervical cancer screenings for women over age 72 are only recommended with certain risk factors. Here is a list of your current Health Maintenance items (your personalized list of preventive services) with a due date: 
Health Maintenance Due Topic Date Due  Shingles Vaccine (1 of 2) 10/27/1987  Glaucoma Screening   10/27/2002 Yessica Yogi Annual Well Visit  08/06/2020  Yearly Flu Vaccine (1) 09/01/2020 Medicare Wellness Visit, Female The best way to live healthy is to have a lifestyle where you eat a well-balanced diet, exercise regularly, limit alcohol use, and quit all forms of tobacco/nicotine, if applicable. Regular preventive services are another way to keep healthy. Preventive services (vaccines, screening tests, monitoring & exams) can help personalize your care plan, which helps you manage your own care. Screening tests can find health problems at the earliest stages, when they are easiest to treat. Denny follows the current, evidence-based guidelines published by the [facility] Cyrus Manzo ([facility]) when recommending preventive services for our patients. Because we follow these guidelines, sometimes recommendations change over time as research supports it. (For example, mammograms used to be recommended annually. Even though Medicare will still pay for an annual mammogram, the newer guidelines recommend a mammogram every two years for women of average risk). Of course, you and your doctor may decide to screen more often for some diseases, based on your risk and your co-morbidities (chronic disease you are already diagnosed with). Preventive services for you include: - Medicare offers their members a free annual wellness visit, which is time for you and your primary care provider to discuss and plan for your preventive service needs. Take advantage of this benefit every year! 
-All adults over the age of 72 should receive the recommended pneumonia vaccines. Current USPSTF guidelines recommend a series of two vaccines for the best pneumonia protection.  
-All adults should have a flu vaccine yearly and a tetanus vaccine every 10 years.  
-All adults age 48 and older should receive the shingles vaccines (series of two vaccines). -All adults age 38-68 who are overweight should have a diabetes screening test once every three years.  
-All adults born between 80 and 1965 should be screened once for Hepatitis C. 
-Other screening tests and preventive services for persons with diabetes include: an eye exam to screen for diabetic retinopathy, a kidney function test, a foot exam, and stricter control over your cholesterol.  
-Cardiovascular screening for adults with routine risk involves an electrocardiogram (ECG) at intervals determined by your doctor. -Colorectal cancer screenings should be done for adults age 54-65 with no increased risk factors for colorectal cancer. There are a number of acceptable methods of screening for this type of cancer. Each test has its own benefits and drawbacks. Discuss with your doctor what is most appropriate for you during your annual wellness visit. The different tests include: colonoscopy (considered the best screening method), a fecal occult blood test, a fecal DNA test, and sigmoidoscopy. 
 
-A bone mass density test is recommended when a woman turns 65 to screen for osteoporosis. This test is only recommended one time, as a screening. Some providers will use this same test as a disease monitoring tool if you already have osteoporosis. -Breast cancer screenings are recommended every other year for women of normal risk, age 54-69. 
-Cervical cancer screenings for women over age 72 are only recommended with certain risk factors. Here is a list of your current Health Maintenance items (your personalized list of preventive services) with a due date: 
Health Maintenance Due Topic Date Due  Shingles Vaccine (1 of 2) 10/27/1987  Glaucoma Screening   [facility]  [facility]  Yearly Flu Vaccine (1) 09/01/2020

## 2020-09-18 NOTE — PROGRESS NOTES
Patient being seen for follow up on her B12 no concerns. 1. Have you been to the ER, urgent care clinic since your last visit? Hospitalized since your last visit? No  2. Have you seen or consulted any other health care providers outside of the [address] [place] [address] since your last visit? Include any pap smears or colon screening. No    Medication reconciliation has been completed with patient. Care team discussed/updated as well as pharmacy. Health Maintenance Due   Topic Date Due    Shingrix Vaccine Age 49> (1 of 2) 10/27/1987    GLAUCOMA SCREENING Q2Y  10/27/2002    Medicare Yearly Exam  08/06/2020    Flu Vaccine (1) 09/01/2020     Health Maintenance reviewed - MWV today.

## 2020-09-18 NOTE — PROGRESS NOTES
Scarlett Wang is a 82 y.o. female who was seen by synchronous (real-time) audio-video technology on 9/18/2020 for Vitamin B12 Deficiency        Assessment & Plan:   Diagnoses and all orders for this visit:    1. Vitamin B12 deficiency  -     VITAMIN B12 & FOLATE; Future  -     CBC WITH AUTOMATED DIFF; Future  -     METABOLIC PANEL, BASIC; Future    2. Mixed stress and urge urinary incontinence    3. Paroxysmal SVT (supraventricular tachycardia) (HCC)      Wants to stay on B12 monthly until after her bladder is repaired. Agreed to decrease frequency to monthly vs q3 weeks. I agree her fatigue reflects the sleep deprivation from the the incontinence, not the B12 deficiency. Likely placebo effect here. Low risk of harm. Delaying surgery due to pandemic. PSVT: asymptomatic. No intervention needed. Follow-up and Dispositions    · Return in about 6 months (around 3/18/2021) for b12 deficiiency follow up, non fasting labs 1 week prior. 712  Subjective: We reviewed the results from her consultation with Dr. Levy Chimera 6/19/2020: Norma Jefferson had thought that she had A. fib and that this was told to her over 50 years ago so it is unclear that we would even be able to find anything. But what I can tell you she had a Holter monitor through [facility] ([facility]) in 2018 were also I have looked I have never seen atrial fibrillation documented. She has had some short runs of SVT the longest at 6 beats but she is completely asymptomatic with this. \"    FU B12 def: on replacement x 6-8 months  Has been taking it every 3 weeks per Dr. Floresita Kang advice. \"I can feel the difference the very next day. \"    After about 3 weeks \"I feel like I can't keep going. It may be because of my bladder. \" up q2 hours at night. Delaying surgery due to pandemic.     Lab Results   Component Value Date/Time    Vitamin B12 1,751 (H) 09/08/2020 11:12 AM    Folate >20.0 (H) 09/08/2020 11:12 AM     Lab Results   Component Value Date/Time    WBC 4.3 (L) 09/08/2020 11:12 AM    HGB 12.7 09/08/2020 11:12 AM    HCT 39.2 09/08/2020 11:12 AM    PLATELET 241 00/17/7784 11:12 AM    MCV 96.1 09/08/2020 11:12 AM         Prior to Admission medications    Medication Sig Start Date End Date Taking? Authorizing Provider   Syringe with Needle, Safety 3 mL 25 gauge x 5/8\" syrg Use with B12 8/19/20  Yes Hunter Amin MD   cyanocobalamin (Vitamin B-12) 1,000 mcg/mL injection Inject 1 ml IM monthly 7/7/20  Yes Hunter Amni MD   cephALEXin [facility]) 250 mg capsule Take 250 mg by mouth daily. Yes Provider, Historical   calcium-cholecalciferol, d3, (CALCIUM 600 + D) 600-125 mg-unit tab Take 1 Tab by mouth daily. Yes Provider, Historical   glucosamine sulfate 750 mg tab 750 mg. 2/6/11  Yes Provider, Historical   fish oil-omega-3 fatty acids 340-1,000 mg capsule 2,000 mg. 2/7/11  Yes Provider, Historical   B.infantis-B.ani-B.long-B.bifi (PROBIOTIC 4X) 10-15 mg TbEC Take 1 Cap by mouth daily. Yes Provider, Historical   ascorbic acid (VITAMIN C) 1,000 mg tablet Take 1,000 mg by mouth daily. Yes Provider, Historical   estradiol (ESTRACE) 0.01 % (0.1 mg/gram) vaginal cream Apply small amount to urethral  Opening daily 8/25/14  Yes Karla Calzada MD   OTHER 2 Tabs two (2) times a day. Macular Protect Complete vitamin   Yes Provider, Historical   aspirin 81 mg tablet Take 81 mg by mouth daily. Yes Provider, Historical   folic acid 590 mcg tablet Take 400 mcg by mouth daily. Yes Provider, Historical   ASCORBIC ACID/VITAMIN E (CRANBERRY CONCENTRATE PO) Take 1 Cap by mouth two (2) times a day. Yes Provider, Historical         ROS    Objective:   No flowsheet data found.    General: alert, cooperative, no distress   Mental  status: normal mood, behavior, speech, dress, motor activity, and thought processes, able to follow commands   HENT: NCAT   Neck: no visualized mass   Resp: no respiratory distress   Neuro: no gross deficits   Skin: no discoloration or lesions of concern on visible areas   Psychiatric: normal affect, consistent with stated mood, no evidence of hallucinations     Additional exam findings: We discussed the expected course, resolution and complications of the diagnosis(es) in detail. Medication risks, benefits, costs, interactions, and alternatives were discussed as indicated. I advised her to contact the office if her condition worsens, changes or fails to improve as anticipated. She expressed understanding with the diagnosis(es) and plan. Kennth Kawasaki, who was evaluated through a patient-initiated, synchronous (real-time) audio-video encounter, and/or her healthcare decision maker, is aware that it is a billable service, with coverage as determined by her insurance carrier. She provided verbal consent to proceed: Yes, and patient identification was verified. It was conducted pursuant to the emergency declaration under the [address] [place] [address], [address] [place] [address] authority and the [employer] General Act. A caregiver was present when appropriate. Ability to conduct physical exam was limited. I was at home. The patient was at home.       Roberto Felix MD

## 2020-09-24 ENCOUNTER — TELEPHONE (OUTPATIENT)
Dept: FAMILY MEDICINE CLINIC | Age: 85
End: 2020-09-24

## 2020-09-24 NOTE — TELEPHONE ENCOUNTER
Ms. [place] Jamir is calling inquiring about her BUN levels.  She would like a return call at [phone]

## 2020-09-25 NOTE — TELEPHONE ENCOUNTER
Kidney function has been stable since at least 2016, which is as far back as I can see. No intervention indicated.  ELEANOR

## 2020-09-25 NOTE — TELEPHONE ENCOUNTER
Called patient  verified she has been told Dr. Brittany Kunz has reviewed her labs as far back as  and says there is no intervention needed at this time. Patient did also ask about her b12 level she was wanting to know how elevated her level was she was given her level as well as the recommended reference range. She says has stopped the b12 was just curious about her level.

## 2021-05-03 ENCOUNTER — TELEPHONE (OUTPATIENT)
Dept: FAMILY MEDICINE CLINIC | Age: 86
End: 2021-05-03

## 2021-05-03 NOTE — TELEPHONE ENCOUNTER
Patient called and states she sees Dr. Arnol Nguyen a uro/gyneologist in [facility] and has been taking Keflex 250mg twice a day for seven days. Yesterday was her last day, today is her first day off of it. She states Dr. Arnol Nguyen requsted that she get  UA and culture 3-5 days after being off the medication to make sure she no longer has infection. She states she plans on still seeing Dr. Arnol Nguyen but doesn't want to go all the way to [facility] just for a urine. She did also mention she needs labs for us. I scheduled her for a follow up on 5/25 but I did not schedule her labs a week prior until I sent this message in case we end up bringing her in for UA sooner and can get labs at that same time. Patient aware I am sending this message prior to scheduling any labs. She would like to be contacted on her home #. I called Dr. Zoila Nunez office ([phone]) and spoke with medical records and they asked me to fax a request for office notes and orders to [phone] and they will send records over.

## 2021-05-04 NOTE — TELEPHONE ENCOUNTER
Patient calling again to schedule. Informed her that we have to wait for Dr. Carson Paulino to place the order, then we can do labs and ua at the same time. Patient would like this scheduled as soon as possible. Please review and contact patient.

## 2021-05-07 ENCOUNTER — CLINICAL SUPPORT (OUTPATIENT)
Dept: FAMILY MEDICINE CLINIC | Age: 86
End: 2021-05-07
Payer: MEDICARE

## 2021-05-07 ENCOUNTER — HOSPITAL ENCOUNTER (OUTPATIENT)
Dept: LAB | Age: 86
Discharge: HOME | End: 2021-05-07
Payer: MEDICARE

## 2021-05-07 VITALS — DIASTOLIC BLOOD PRESSURE: 70 MMHG | SYSTOLIC BLOOD PRESSURE: 126 MMHG

## 2021-05-07 DIAGNOSIS — N39.0 URINARY TRACT INFECTION WITH HEMATURIA, SITE UNSPECIFIED: ICD-10-CM

## 2021-05-07 DIAGNOSIS — Z13.220 SCREENING FOR CHOLESTEROL LEVEL: ICD-10-CM

## 2021-05-07 DIAGNOSIS — E53.8 VITAMIN B12 DEFICIENCY: ICD-10-CM

## 2021-05-07 DIAGNOSIS — R31.9 URINARY TRACT INFECTION WITH HEMATURIA, SITE UNSPECIFIED: ICD-10-CM

## 2021-05-07 DIAGNOSIS — I05.9 MITRAL VALVE DISORDER: ICD-10-CM

## 2021-05-07 DIAGNOSIS — Z13.220 SCREENING FOR CHOLESTEROL LEVEL: Primary | ICD-10-CM

## 2021-05-07 LAB
APPEARANCE UR: CLEAR
BACTERIA URNS QL MICRO: NEGATIVE /HPF
BASOPHILS # BLD: 0 K/UL (ref 0–0.1)
BASOPHILS NFR BLD: 0 % (ref 0–2)
BILIRUB UR QL: NEGATIVE
CHOLEST SERPL-MCNC: 172 MG/DL
COLOR UR: YELLOW
DIFFERENTIAL METHOD BLD: NORMAL
EOSINOPHIL # BLD: 0.1 K/UL (ref 0–0.4)
EOSINOPHIL NFR BLD: 1 % (ref 0–5)
EPITH CASTS URNS QL MICRO: NORMAL /LPF (ref 0–5)
ERYTHROCYTE [DISTWIDTH] IN BLOOD BY AUTOMATED COUNT: 13.2 % (ref 11.6–14.5)
FOLATE SERPL-MCNC: >20 NG/ML (ref 3.1–17.5)
GLUCOSE UR STRIP.AUTO-MCNC: NEGATIVE MG/DL
HCT VFR BLD AUTO: 40.8 % (ref 35–45)
HDLC SERPL-MCNC: 60 MG/DL (ref 40–60)
HDLC SERPL: 2.9 {RATIO} (ref 0–5)
HGB BLD-MCNC: 12.9 G/DL (ref 12–16)
HGB UR QL STRIP: ABNORMAL
KETONES UR QL STRIP.AUTO: NEGATIVE MG/DL
LDLC SERPL CALC-MCNC: 94 MG/DL (ref 0–100)
LEUKOCYTE ESTERASE UR QL STRIP.AUTO: ABNORMAL
LIPID PROFILE,FLP: NORMAL
LYMPHOCYTES # BLD: 1.3 K/UL (ref 0.9–3.6)
LYMPHOCYTES NFR BLD: 25 % (ref 21–52)
MCH RBC QN AUTO: 30.3 PG (ref 24–34)
MCHC RBC AUTO-ENTMCNC: 31.6 G/DL (ref 31–37)
MCV RBC AUTO: 95.8 FL (ref 74–97)
MONOCYTES # BLD: 0.4 K/UL (ref 0.05–1.2)
MONOCYTES NFR BLD: 8 % (ref 3–10)
NEUTS SEG # BLD: 3.3 K/UL (ref 1.8–8)
NEUTS SEG NFR BLD: 65 % (ref 40–73)
NITRITE UR QL STRIP.AUTO: NEGATIVE
PH UR STRIP: 5.5 [PH] (ref 5–8)
PLATELET # BLD AUTO: 192 K/UL (ref 135–420)
PMV BLD AUTO: 11.1 FL (ref 9.2–11.8)
PROT UR STRIP-MCNC: NEGATIVE MG/DL
RBC # BLD AUTO: 4.26 M/UL (ref 4.2–5.3)
RBC #/AREA URNS HPF: NORMAL /HPF (ref 0–5)
SP GR UR REFRACTOMETRY: 1.01 (ref 1–1.03)
TRIGL SERPL-MCNC: 90 MG/DL (ref ?–150)
UROBILINOGEN UR QL STRIP.AUTO: 0.2 EU/DL (ref 0.2–1)
VIT B12 SERPL-MCNC: 1964 PG/ML (ref 211–911)
VLDLC SERPL CALC-MCNC: 18 MG/DL
WBC # BLD AUTO: 5.1 K/UL (ref 4.6–13.2)
WBC URNS QL MICRO: NORMAL /HPF (ref 0–4)

## 2021-05-07 PROCEDURE — 36415 COLL VENOUS BLD VENIPUNCTURE: CPT | Performed by: FAMILY MEDICINE

## 2021-05-07 PROCEDURE — 82746 ASSAY OF FOLIC ACID SERUM: CPT

## 2021-05-07 PROCEDURE — 81001 URINALYSIS AUTO W/SCOPE: CPT

## 2021-05-07 PROCEDURE — 85025 COMPLETE CBC W/AUTO DIFF WBC: CPT

## 2021-05-07 PROCEDURE — 80061 LIPID PANEL: CPT

## 2021-05-07 PROCEDURE — 87086 URINE CULTURE/COLONY COUNT: CPT

## 2021-05-07 NOTE — PROGRESS NOTES
Patient here for NV blood pressure check and lab draw as well as urine to be sent to Dr. Roseanna Ludwig her uro/gyn at [facility]. Name and  verified blood pressure checked manually, venipuncture performed on patients left arm was successful patient tolerated well. Patient asked if her cholesterol was being checked she was told Dr. Víctor Ng had not put in an for that she is asking if this can be added to her labs. Told patient I will ask Dr. íVctor Ng about this, Dr. Víctor Ng is okay with checking Lipid on patient order has been added.

## 2021-05-09 LAB
BACTERIA SPEC CULT: NORMAL
CC UR VC: NORMAL
SERVICE CMNT-IMP: NORMAL

## 2021-05-10 ENCOUNTER — TELEPHONE (OUTPATIENT)
Dept: FAMILY MEDICINE CLINIC | Age: 86
End: 2021-05-10

## 2021-05-11 NOTE — PROGRESS NOTES
Urine results were sent to Dr. Cesar Rosario her uro/gyn per her request for review. Called and verified with that office on yesterday they had received her results to give to Dr. Cesar Rosario.

## 2021-05-11 NOTE — PROGRESS NOTES
The urine results are consistent with a contaminated specimen. I recommend recollection. Will review labs in detail at her upcoming appointment.  ELEANOR

## 2021-05-12 NOTE — PROGRESS NOTES
Called patient to be sure she was able to get in contact with Dr. Zoila Nunez office in reference to her results, tried home number line is busy called cell number no answer left message letting patient know I was calling to be sure she was able to get in contact with Dr. Zoila Nunez office about her urine results asked that she call the office back, office number has been left.

## 2021-05-12 NOTE — PROGRESS NOTES
Patient called the office back  verified asked if she had been in contact with Dr. Jay Macedo office about her urine results she says she has not. Told patient Dr. Mamie Chavez had also reviewed her results and recommends recollection due to possible contamination, patient says she will have to wait until she gets some gas, she was made aware she does not have to come out here to the office to submit the urine she can either go to [facility] or [facility] if this would be more convenient for her. She has expressed understanding and did not have any further questions.

## 2021-05-14 ENCOUNTER — HOSPITAL ENCOUNTER (OUTPATIENT)
Dept: LAB | Age: 86
Discharge: HOME | End: 2021-05-14
Payer: MEDICARE

## 2021-05-14 DIAGNOSIS — N39.0 URINARY TRACT INFECTION WITH HEMATURIA, SITE UNSPECIFIED: ICD-10-CM

## 2021-05-14 DIAGNOSIS — R31.9 URINARY TRACT INFECTION WITH HEMATURIA, SITE UNSPECIFIED: ICD-10-CM

## 2021-05-14 LAB
APPEARANCE UR: CLEAR
BACTERIA URNS QL MICRO: ABNORMAL /HPF
BILIRUB UR QL: NEGATIVE
COLOR UR: YELLOW
EPITH CASTS URNS QL MICRO: ABNORMAL /LPF (ref 0–5)
GLUCOSE UR STRIP.AUTO-MCNC: NEGATIVE MG/DL
HGB UR QL STRIP: NEGATIVE
KETONES UR QL STRIP.AUTO: NEGATIVE MG/DL
LEUKOCYTE ESTERASE UR QL STRIP.AUTO: NEGATIVE
NITRITE UR QL STRIP.AUTO: NEGATIVE
PH UR STRIP: 5.5 [PH] (ref 5–8)
PROT UR STRIP-MCNC: NEGATIVE MG/DL
RBC #/AREA URNS HPF: ABNORMAL /HPF (ref 0–5)
SP GR UR REFRACTOMETRY: 1.01 (ref 1–1.03)
UROBILINOGEN UR QL STRIP.AUTO: 0.2 EU/DL (ref 0.2–1)
WBC URNS QL MICRO: ABNORMAL /HPF (ref 0–4)

## 2021-05-14 PROCEDURE — 81001 URINALYSIS AUTO W/SCOPE: CPT

## 2021-05-14 PROCEDURE — 36415 COLL VENOUS BLD VENIPUNCTURE: CPT

## 2021-05-18 NOTE — PROGRESS NOTES
Called patient  verified she has been made aware of her results and told I will be forwarding a copy to her urologists as well.

## 2021-05-25 ENCOUNTER — OFFICE VISIT (OUTPATIENT)
Dept: FAMILY MEDICINE CLINIC | Age: 86
End: 2021-05-25
Payer: MEDICARE

## 2021-05-25 VITALS
TEMPERATURE: 97.8 F | SYSTOLIC BLOOD PRESSURE: 128 MMHG | WEIGHT: 133.4 LBS | HEART RATE: 69 BPM | BODY MASS INDEX: 20.89 KG/M2 | RESPIRATION RATE: 10 BRPM | OXYGEN SATURATION: 96 % | DIASTOLIC BLOOD PRESSURE: 78 MMHG

## 2021-05-25 DIAGNOSIS — L21.9 SEBORRHEIC DERMATITIS: ICD-10-CM

## 2021-05-25 DIAGNOSIS — Z86.39 HISTORY OF NON ANEMIC VITAMIN B12 DEFICIENCY: ICD-10-CM

## 2021-05-25 DIAGNOSIS — I47.1 PAROXYSMAL SVT (SUPRAVENTRICULAR TACHYCARDIA) (HCC): Primary | ICD-10-CM

## 2021-05-25 DIAGNOSIS — H61.21 IMPACTED CERUMEN OF RIGHT EAR: ICD-10-CM

## 2021-05-25 PROCEDURE — G8427 DOCREV CUR MEDS BY ELIG CLIN: HCPCS | Performed by: FAMILY MEDICINE

## 2021-05-25 PROCEDURE — G8510 SCR DEP NEG, NO PLAN REQD: HCPCS | Performed by: FAMILY MEDICINE

## 2021-05-25 PROCEDURE — G8420 CALC BMI NORM PARAMETERS: HCPCS | Performed by: FAMILY MEDICINE

## 2021-05-25 PROCEDURE — G8536 NO DOC ELDER MAL SCRN: HCPCS | Performed by: FAMILY MEDICINE

## 2021-05-25 PROCEDURE — G8399 PT W/DXA RESULTS DOCUMENT: HCPCS | Performed by: FAMILY MEDICINE

## 2021-05-25 PROCEDURE — 1090F PRES/ABSN URINE INCON ASSESS: CPT | Performed by: FAMILY MEDICINE

## 2021-05-25 PROCEDURE — 1101F PT FALLS ASSESS-DOCD LE1/YR: CPT | Performed by: FAMILY MEDICINE

## 2021-05-25 PROCEDURE — 99214 OFFICE O/P EST MOD 30 MIN: CPT | Performed by: FAMILY MEDICINE

## 2021-05-25 RX ORDER — FLUOCINONIDE TOPICAL SOLUTION USP, 0.05% 0.5 MG/ML
SOLUTION TOPICAL
Qty: 1 BOTTLE | Refills: 1 | Status: SHIPPED | OUTPATIENT
Start: 2021-05-25

## 2021-05-25 NOTE — PATIENT INSTRUCTIONS
Look for over the counter Debrox as an alternative     Earwax Blockage: Care Instructions  Your Care Instructions     Earwax is a natural substance that protects the ear canal. Normally, earwax drains from the ears and does not cause problems. Sometimes earwax builds up and hardens. Earwax blockage (also called cerumen impaction) can cause some loss of hearing and pain. When wax is tightly packed, you will need to have your doctor remove it. Follow-up care is a key part of your treatment and safety. Be sure to make and go to all appointments, and call your doctor if you are having problems. It's also a good idea to know your test results and keep a list of the medicines you take. How can you care for yourself at home? · Do not try to remove earwax with cotton swabs, fingers, or other objects. This can make the blockage worse and damage the eardrum. · If your doctor recommends that you try to remove earwax at home:  ? Soften and loosen the earwax with warm mineral oil. You also can try hydrogen peroxide mixed with an equal amount of room temperature water. Place 2 drops of the fluid, warmed to body temperature, in the ear two times a day for up to 5 days. ? Once the wax is loose and soft, all that is usually needed to remove it from the ear canal is a gentle, warm shower. Direct the water into the ear, then tip your head to let the earwax drain out. Dry your ear thoroughly with a hair dryer set on low. Hold the dryer several inches from your ear. ? If the warm mineral oil and shower do not work, use an over-the-counter wax softener. Read and follow all instructions on the label. After using the wax softener, use an ear syringe to gently flush the ear. Make sure the flushing solution is body temperature. Cool or hot fluids in the ear can cause dizziness. When should you call for help?    Call your doctor now or seek immediate medical care if:    · Pus or blood drains from your ear.     · Your ears are ringing or feel full.     · You have a loss of hearing. Watch closely for changes in your health, and be sure to contact your doctor if:    · You have pain or reduced hearing after 1 week of home treatment.     · You have any new symptoms, such as nausea or balance problems. Where can you learn more? Go to http://www.gray.com/  Enter Q495 in the search box to learn more about \"Earwax Blockage: Care Instructions. \"  Current as of: February 26, 2020               Content Version: 12.8  © 2006-2021 [employer]. Care instructions adapted under license by [employer] (which disclaims liability or warranty for this information). If you have questions about a medical condition or this instruction, always ask your healthcare professional. [address] any warranty or liability for your use of this information.

## 2021-05-25 NOTE — PROGRESS NOTES
Edythe Nageotte (: 1935) is a 82 y.o. female, established patient, here for evaluation of the following chief complaint(s):  Vitamin B12 Deficiency       ASSESSMENT/PLAN:  1. Paroxysmal SVT (supraventricular tachycardia) (HCC)  Assessment & Plan:  Asymptomatic. Reg rhythm today. No intervention indicated. 2. History of non anemic vitamin B12 deficiency  Assessment & Plan:  Stores more than adequate off replacement > 6 months. Continue periodic surveillance. Orders:  -     VITAMIN B12 & FOLATE; Future  3. Impacted cerumen of right ear  Comments:  OTC management. follow up if needed  4. Seborrheic dermatitis  Assessment & Plan:  New to me. Uncontrolled. Continue sporadic topical steroids as needed   Orders:  -     fluocinoNIDE (LIDEX) 0.05 % external solution; Apply to cotton tipped applicator to apply to ear canals once as needed, Normal, Disp-1 Bottle, R-1    Invited to return for dedicated evaluation of foot symptoms if they progress. Return in about 6 months (around 2021) for chronic medical conditions, non fasting labs 1 week prior, MWV same day. SUBJECTIVE/OBJECTIVE:  HPI  Recent bulking procedure went well. No cardiac symptoms     \"Crawling\" sensation in her feet when she goes to bed at night, perhaps 1/month. Wakes her from sleep. Rubs her feet with Vicks VapoRub with good effect. No B12 injections since last visit. Remote history of frequent visits dedicated to eval/tx bilateral ear itching. Ultimate relief with fluocinonide 0.05% topical prn, approx 1/month. Brings a bottle dated .  No history of dandruff, brow or eyelash involvement    Lab Results   Component Value Date/Time    Vitamin B12 1,964 (H) 2021 11:53 AM    Folate >20.0 (H) 2021 11:53 AM     Lab Results   Component Value Date/Time    Cholesterol, total 172 2021 11:53 AM    HDL Cholesterol 60 2021 11:53 AM    LDL, calculated 94 2021 11:53 AM    VLDL, calculated 18 2021 11:53 AM Triglyceride 90 05/07/2021 11:53 AM    CHOL/HDL Ratio 2.9 05/07/2021 11:53 AM     Lab Results   Component Value Date/Time    WBC 5.1 05/07/2021 11:53 AM    HGB 12.9 05/07/2021 11:53 AM    HCT 40.8 05/07/2021 11:53 AM    PLATELET 829 44/70/6138 11:53 AM    MCV 95.8 05/07/2021 11:53 AM       Physical Exam  Constitutional:       General: She is not in acute distress. Appearance: She is well-developed. She is not diaphoretic. HENT:      Head: Normocephalic and atraumatic. Right Ear: There is impacted cerumen. Left Ear: Tympanic membrane, ear canal and external ear normal.   Cardiovascular:      Rate and Rhythm: Normal rate and regular rhythm. Heart sounds: Normal heart sounds. No murmur heard. No friction rub. No gallop. Pulmonary:      Effort: Pulmonary effort is normal. No respiratory distress. Breath sounds: Normal breath sounds. No wheezing, rhonchi or rales. Skin:     General: Skin is warm and dry. Nails: There is no clubbing. Neurological:      Mental Status: She is alert and oriented to person, place, and time. Psychiatric:         Behavior: Behavior normal.         Thought Content: Thought content normal.         Judgment: Judgment normal.               An electronic signature was used to authenticate this note.   -- Hiren Dubois MD

## 2021-05-25 NOTE — PROGRESS NOTES
Patient being seen in office today for 6 month f/u on her B12 def as well as to discuss her lab results. Patient says she has been having issues with neuropathic pain in bont feet at night. 1. Have you been to the ER, urgent care clinic since your last visit? Hospitalized since your last visit? No  2. Have you seen or consulted any other health care providers outside of the [address] [place] [address] since your last visit? Include any pap smears or colon screening. No    Medication reconciliation has been completed with patient. Care team discussed/updated as well as pharmacy.     Health Maintenance Due   Topic Date Due    COVID-19 Vaccine (1) Never done    Shingrix Vaccine Age 50> (1 of 2) Never done

## 2021-06-08 ENCOUNTER — TELEPHONE (OUTPATIENT)
Dept: FAMILY MEDICINE CLINIC | Age: 86
End: 2021-06-08

## 2021-06-08 DIAGNOSIS — N39.0 RECURRENT UTI: Primary | ICD-10-CM

## 2021-06-08 NOTE — TELEPHONE ENCOUNTER
Called patient back she has been made aware Dr. Roshan Borrero has ordered her tests toady as a courtesy and any other request will need to be addressed in office, or with physician who is requesting labs to be done. Patient has expressed clear understanding and does not have any questions at this time.
Called patient no answer left message asking that she call the office back.
Ordering as a courtesy today to avoid a delay in care. In the future, lab orders will not be fielded outside of an appointment as necessary to ensure she's receiving the case she needs. Orders for other physicians will need to come from those physicians and taken to the lab.  ELEANOR
Patient call back at 9:12 requesting a referral to a urologist. Please review and advise. Patient can be reach at [phone].
Patient called and states Dr. Kash Myers started a prophylactic dose of antibiotics and wanted another urine test.  Patient is requesting another order to be faxed to [facility] for her to complete. The med started was Cephalexin 250mg once daily. She is asking to speak with Dede Curling about this but got her to give me some details first.  She can be reached at [phone].
Patient called the office back and says all she needs is an order to have her urine checked in the next two days.
Patient called the office back she was made aware Dr. Nona York would like her to set up an appt for the referral request as well as the urine orders. Patient says she does not have time to do an appt (offered virtual as well as in office). She says to forget about the referral and would like the orders placed for her urine test so she can have this done at [facility] says she needs her urine checked within the next two days, told patient I will send this back to Dr. Nona York and will call her back with her response, please advise.
Patient requesting orders to be placed for urine once she has completed antibiotics that she was put on by Dr. Faustino Flynn and is asking that the results he faxed to his office, she is also asking for a referral to urology for a second opinion. She says Dr. Faustino Flynn did a procedure on her bladder and since then she has had to take antibiotics, she does not want to have to continue to take antibiotics and is looking for a second opinion on her treatment. Orders have been pended as well as referral please review and sign if appropriate.
Please schedule with me either in person or virtual. 15 is fine.  ELEANOR
awake/alert/orientedx1

## 2021-06-09 ENCOUNTER — HOSPITAL ENCOUNTER (OUTPATIENT)
Dept: LAB | Age: 86
Discharge: HOME | End: 2021-06-09
Payer: MEDICARE

## 2021-06-09 DIAGNOSIS — N39.0 RECURRENT UTI: ICD-10-CM

## 2021-06-09 LAB
APPEARANCE UR: CLEAR
BACTERIA URNS QL MICRO: ABNORMAL /HPF
BILIRUB UR QL: NEGATIVE
COLOR UR: YELLOW
EPITH CASTS URNS QL MICRO: ABNORMAL /LPF (ref 0–5)
GLUCOSE UR STRIP.AUTO-MCNC: NEGATIVE MG/DL
HGB UR QL STRIP: NEGATIVE
KETONES UR QL STRIP.AUTO: NEGATIVE MG/DL
LEUKOCYTE ESTERASE UR QL STRIP.AUTO: NEGATIVE
MUCOUS THREADS URNS QL MICRO: ABNORMAL /LPF
NITRITE UR QL STRIP.AUTO: NEGATIVE
PH UR STRIP: 6 [PH] (ref 5–8)
PROT UR STRIP-MCNC: NEGATIVE MG/DL
SP GR UR REFRACTOMETRY: 1.01 (ref 1–1.03)
UROBILINOGEN UR QL STRIP.AUTO: 0.2 EU/DL (ref 0.2–1)
WBC URNS QL MICRO: ABNORMAL /HPF (ref 0–5)

## 2021-06-09 PROCEDURE — 36415 COLL VENOUS BLD VENIPUNCTURE: CPT

## 2021-06-09 PROCEDURE — 81001 URINALYSIS AUTO W/SCOPE: CPT

## 2021-06-09 PROCEDURE — 87086 URINE CULTURE/COLONY COUNT: CPT

## 2021-06-11 LAB
BACTERIA SPEC CULT: NORMAL
CC UR VC: NORMAL
SERVICE CMNT-IMP: NORMAL

## 2021-06-14 NOTE — PROGRESS NOTES
Labs have been faxed to Dr. Nuvia Vickers office, fax confirmation has been received patient notified her results have been faxed to Dr. Nuvia Vickers office for review.

## 2021-07-15 ENCOUNTER — TELEPHONE (OUTPATIENT)
Dept: FAMILY MEDICINE CLINIC | Age: 86
End: 2021-07-15

## 2021-07-15 NOTE — TELEPHONE ENCOUNTER
Ms. Oralia Darling called stating she went to urgent care to be checked out. She had xray and bronchitis was clear. She still is having pain in her chest and is asking if she could see Dr. Danny Ferrari tomorrow morning for an appointment. Informed her that I didn't have anything, however I would send a msg to the nurse/provider for response.

## 2021-07-16 NOTE — TELEPHONE ENCOUNTER
Called patient advised that she also get in contact with Cardiology. She says she needs to explain this to me she says she began having chest pain on Sunday. [facility] and says she was dx with acute bronchitis that was viral. She says she still has chest pain on the left side near her \"neck bone\" says pain is dull aching pain. She was advised to still call her Cardiologists or go to ER, patient has declined and says she would like to hear what Dr. Richard Thomas says first. Patient says she thinks she just has an infection from the virus, please advise.

## 2021-07-16 NOTE — TELEPHONE ENCOUNTER
Called patients home number line busy called mobile number patient did answer and has been advised per Dr. Jay Hamm to go to the ER. She has expressed understanding and agrees with this plan.

## 2022-03-18 PROBLEM — N39.46 MIXED STRESS AND URGE URINARY INCONTINENCE: Status: ACTIVE | Noted: 2020-03-12

## 2022-03-19 PROBLEM — Z86.39 HISTORY OF NON ANEMIC VITAMIN B12 DEFICIENCY: Status: ACTIVE | Noted: 2021-05-25

## 2022-03-19 PROBLEM — L21.9 SEBORRHEIC DERMATITIS: Status: ACTIVE | Noted: 2021-05-25

## 2022-03-19 PROBLEM — N39.0 RECURRENT UTI: Status: ACTIVE | Noted: 2020-03-12

## 2022-03-19 PROBLEM — I47.1 PAROXYSMAL SVT (SUPRAVENTRICULAR TACHYCARDIA) (HCC): Status: ACTIVE | Noted: 2020-09-18

## 2022-03-19 PROBLEM — I47.10 PAROXYSMAL SVT (SUPRAVENTRICULAR TACHYCARDIA): Status: ACTIVE | Noted: 2020-09-18

## 2022-03-24 ENCOUNTER — TRANSCRIBE ORDER (OUTPATIENT)
Dept: SCHEDULING | Age: 87
End: 2022-03-24

## 2022-03-24 DIAGNOSIS — M54.16 LUMBAR RADICULOPATHY: Primary | ICD-10-CM

## 2022-04-12 ENCOUNTER — OFFICE VISIT (OUTPATIENT)
Dept: INTERNAL MEDICINE CLINIC | Age: 87
End: 2022-04-12
Payer: MEDICARE

## 2022-04-12 VITALS
TEMPERATURE: 97.3 F | DIASTOLIC BLOOD PRESSURE: 73 MMHG | BODY MASS INDEX: 20.75 KG/M2 | OXYGEN SATURATION: 98 % | SYSTOLIC BLOOD PRESSURE: 123 MMHG | HEIGHT: 67 IN | WEIGHT: 132.2 LBS | HEART RATE: 67 BPM | RESPIRATION RATE: 18 BRPM

## 2022-04-12 DIAGNOSIS — Z78.0 POSTMENOPAUSAL ESTROGEN DEFICIENCY: ICD-10-CM

## 2022-04-12 DIAGNOSIS — E78.00 HYPERCHOLESTEROLEMIA: ICD-10-CM

## 2022-04-12 DIAGNOSIS — N39.0 RECURRENT UTI: ICD-10-CM

## 2022-04-12 DIAGNOSIS — M85.80 OSTEOPENIA AFTER MENOPAUSE: ICD-10-CM

## 2022-04-12 DIAGNOSIS — Z78.0 OSTEOPENIA AFTER MENOPAUSE: ICD-10-CM

## 2022-04-12 DIAGNOSIS — Z76.89 ESTABLISHING CARE WITH NEW DOCTOR, ENCOUNTER FOR: Primary | ICD-10-CM

## 2022-04-12 PROCEDURE — 99214 OFFICE O/P EST MOD 30 MIN: CPT | Performed by: INTERNAL MEDICINE

## 2022-04-12 PROCEDURE — G8510 SCR DEP NEG, NO PLAN REQD: HCPCS | Performed by: INTERNAL MEDICINE

## 2022-04-12 PROCEDURE — 1101F PT FALLS ASSESS-DOCD LE1/YR: CPT | Performed by: INTERNAL MEDICINE

## 2022-04-12 PROCEDURE — G0463 HOSPITAL OUTPT CLINIC VISIT: HCPCS | Performed by: INTERNAL MEDICINE

## 2022-04-12 PROCEDURE — G8427 DOCREV CUR MEDS BY ELIG CLIN: HCPCS | Performed by: INTERNAL MEDICINE

## 2022-04-12 PROCEDURE — G8420 CALC BMI NORM PARAMETERS: HCPCS | Performed by: INTERNAL MEDICINE

## 2022-04-12 PROCEDURE — G8399 PT W/DXA RESULTS DOCUMENT: HCPCS | Performed by: INTERNAL MEDICINE

## 2022-04-12 PROCEDURE — 1090F PRES/ABSN URINE INCON ASSESS: CPT | Performed by: INTERNAL MEDICINE

## 2022-04-12 PROCEDURE — G8536 NO DOC ELDER MAL SCRN: HCPCS | Performed by: INTERNAL MEDICINE

## 2022-04-12 NOTE — PROGRESS NOTES
HPI     Jayla Souza is an 62-year-old female here to establish primary care with me. Has a history of frequent UTIs. She has been treated with Cipro and Keflex in the past.  She was prescribed vaginal estrogen cream, but stopped using it because it was messy and because she did not feel it decreased the frequency of her UTIs. She was also put on the phentermine by urology, but did not think it decrease the frequency of her UTIs, so she stopped taking it. She reports she took Fosamax for a while for diagnosis of osteopenia, and would not consider that medication again due to the loss of 3 teeth. She is not particularly in agreement with Medicare wellness visits, but I discussed with her that it is a way for me to gauge the safety of her home situation, as well as to discuss advanced care planning options. We discussed the high morbidity and mortality of a hip fracture, and she agrees to let me order a bone density study on her. I told her I would calculate a FRAX score based on this. Her   of a brain aneurysm around the mid . Past Medical History:   Diagnosis Date    Abnormal weight gain     Arthritis     Atrial fibrillation ([facility])     Cardiac arrhythmia     Chronic low back pain without sciatica     Drowsiness     Feels cold     History of artificial joint     Knee joint replacement status, right     Macrocytosis without anemia     Mitral valve disorder     MVP (mitral valve prolapse)     Osteopenia     PAF (paroxysmal atrial fibrillation) ([facility])     single episode age 70 due to scopolamine patch    PONV (postoperative nausea and vomiting)     no scopolamine patch    Recurrent UTI     Urethral caruncle     Urge incontinence of urine     Urge urinary incontinence     Urinary incontinence     unspec.          Past Surgical History:   Procedure Laterality Date    HX BLADDER SUSPENSION      HX CATARACT REMOVAL Bilateral     HX HYSTERECTOMY      fibroids    HX OOPHORECTOMY Bilateral     benign mass    KS TOTAL KNEE ARTHROPLASTY Right         Current Outpatient Medications   Medication Sig Dispense Refill    coenzyme Q-10 (CO Q-10) 30 mg capsule Take 30 mg by mouth.  cephALEXin (Keflex) 250 mg capsule Take 1 Capsule by mouth daily. 90 Capsule 1    ciprofloxacin HCl (CIPRO) 500 mg tablet Take 1 Tablet by mouth two (2) times a day. 28 Tablet 0    vibegron (Gemtesa) 75 mg tab Take 75 mg by mouth daily. 90 Each 3    fluocinoNIDE (LIDEX) 0.05 % external solution Apply to cotton tipped applicator to apply to ear canals once as needed 1 Bottle 1    calcium-cholecalciferol, d3, (CALCIUM 600 + D) 600-125 mg-unit tab Take 1 Tab by mouth daily.  glucosamine sulfate 750 mg tab 750 mg.      fish oil-omega-3 fatty acids 340-1,000 mg capsule 2,000 mg.      B.infantis-B.ani-B.long-B.bifi (PROBIOTIC 4X) 10-15 mg TbEC Take 1 Cap by mouth daily.  ascorbic acid (VITAMIN C) 1,000 mg tablet Take 1,000 mg by mouth daily.  OTHER 2 Tabs two (2) times a day. Macular Protect Complete vitamin      aspirin 81 mg tablet Take 81 mg by mouth daily.  ASCORBIC ACID/VITAMIN E (CRANBERRY CONCENTRATE PO) Take 1 Cap by mouth two (2) times a day.           Allergies   Allergen Reactions    Latex Anaphylaxis    Ampicillin Hives    Codeine Nausea and Vomiting    Doxycycline Hives     States can take with benadryl    Macrodantin [Nitrofurantoin Macrocrystalline] Hives    Other Medication Hives     Thallium dyes    Pcn [Penicillins] Hives     Other reaction(s): mild rash/itching    Sulfa (Sulfonamide Antibiotics) Hives     Other reaction(s): mild rash/itching    Tetracycline Hives    Formaldehyde Rash    Macrobid [Nitrofurantoin Monohyd/M-Cryst] Rash    Methenamine Hippurate Rash    Scopolamine Other (comments)     Other reaction(s): cardiac dysrhythmia  Patient reports A-Fib arrythmia    Thallium-201 Rash        Social History     Socioeconomic History    Marital status:      Spouse name: Not on file    Number of children: Not on file    Years of education: Not on file    Highest education level: Not on file   Occupational History    Not on file   Tobacco Use    Smoking status: Never Smoker    Smokeless tobacco: Never Used   Substance and Sexual Activity    Alcohol use: No    Drug use: No    Sexual activity: Never   Other Topics Concern    Not on file   Social History Narrative    Not on file     Social Determinants of Health     Financial Resource Strain:     Difficulty of Paying Living Expenses: Not on file   Food Insecurity:     Worried About Running Out of Food in the Last Year: Not on file    Pebbles of Food in the Last Year: Not on file   Transportation Needs:     Lack of Transportation (Medical): Not on file    Lack of Transportation (Non-Medical):  Not on file   Physical Activity:     Days of Exercise per Week: Not on file    Minutes of Exercise per Session: Not on file   Stress:     Feeling of Stress : Not on file   Social Connections:     Frequency of Communication with Friends and Family: Not on file    Frequency of Social Gatherings with Friends and Family: Not on file    Attends [employer] Services: Not on file    Active Member of [address] [place] [address] [employer] or Organizations: Not on file    Attends Club or Organization Meetings: Not on file    Marital Status: Not on file   Intimate Partner Violence:     Fear of Current or Ex-Partner: Not on file    Emotionally Abused: Not on file    Physically Abused: Not on file    Sexually Abused: Not on file   Housing Stability:     Unable to Pay for Housing in the Last Year: Not on file    Number of [place] in the Last Year: Not on file    Unstable Housing in the Last Year: Not on file        Family History   Problem Relation Age of Onset    Cancer Mother         Gastric     Heart Disease Father     Heart Disease Sister     Lung Cancer Brother     Cancer Brother         gastric           Visit Vitals  /73   Pulse 67   Temp 97.3 °F (36.3 °C) (Temporal)   Resp 18   Ht 5' 7\" (1.702 m)   Wt 132 lb 3.2 oz (60 kg)   SpO2 98%   BMI 20.71 kg/m²        Review of Systems   Constitutional:        Weight has been stable for many years. Eyes: Negative for blurred vision. Respiratory: Negative for shortness of breath. Cardiovascular: Negative for chest pain. Gastrointestinal: Negative for blood in stool. Genitourinary: Negative for hematuria. No vaginal bleeding. Physical Exam  Constitutional:       Appearance: She is normal weight. Comments: Thin but well-developed, well-nourished. Eyes:      General: No scleral icterus. Right eye: No discharge. Conjunctiva/sclera: Conjunctivae normal.   Neck:      Comments: Bilateral carotid upstrokes normal to palpation. Lymph: No cervical or supraclavicular lymphadenopathy. Cardiovascular:      Rate and Rhythm: Normal rate and regular rhythm. Pulses: Normal pulses. Heart sounds: No friction rub. No gallop. Pulmonary:      Effort: Pulmonary effort is normal.      Breath sounds: Normal breath sounds. Abdominal:      General: Abdomen is flat. Palpations: Abdomen is soft. Tenderness: There is no abdominal tenderness. Musculoskeletal:      Cervical back: Neck supple. Comments: No clubbing, cyanosis, or edema. Calves are nontender, no cords. Skin:     General: Skin is warm and dry. Neurological:      Mental Status: She is alert and oriented to person, place, and time. Psychiatric:         Mood and Affect: Mood normal.                  Diagnoses and all orders for this visit:    1. Establishing care with new doctor, encounter for  Comments:  Reviewed EHR prior to patient arrival.  With patient, reviewed medical, social, family, and surgical history    2. Postmenopausal estrogen deficiency  Comments:  DEXA, calculate FRAX score.   Reports trouble with teeth and gums on Fosamax, avoid  Orders:  - DEXA BONE DENSITY STUDY AXIAL; Future    3. Osteopenia after menopause  Comments:  no fractures reported. Discussed high risk of morbidity/mortality in patients with hip fracture, and frequent inability to ambulate independently  Orders:  -     METABOLIC PANEL, COMPREHENSIVE; Future    4. Hypercholesterolemia  Comments:  Check fasting panel shortly before next visit  Orders:  -     LIPID PANEL; Future    5. Recurrent UTI  Comments:  Reports methenamine ineffective. Discussed rationale for estrogen cream to try to decrease frequency of UTIs. Follow-up and Dispositions    · Return in about 6 months (around 10/12/2022) for mcwl/ acp-- labs 1 or 2 weeks before.               Magdeil Pierce MD

## 2022-04-12 NOTE — PROGRESS NOTES
Sukhwinder Moe presents today for   Chief Complaint   Patient presents with    New Patient       Is someone accompanying this pt? NO    Is the patient using any DME equipment during OV? NO    Depression Screening:  3 most recent PHQ Screens 4/12/2022   Little interest or pleasure in doing things Not at all   Feeling down, depressed, irritable, or hopeless Not at all   Total Score PHQ 2 0       Learning Assessment:  Learning Assessment 7/24/2018   PRIMARY LEARNER Patient   HIGHEST LEVEL OF EDUCATION - PRIMARY LEARNER  SOME COLLEGE   BARRIERS PRIMARY LEARNER NONE   CO-LEARNER CAREGIVER No   PRIMARY LANGUAGE ENGLISH   LEARNER PREFERENCE PRIMARY LISTENING   ANSWERED BY patient   RELATIONSHIP SELF       Abuse Screening:  Abuse Screening Questionnaire 4/12/2022   Do you ever feel afraid of your partner? N   Are you in a relationship with someone who physically or mentally threatens you? N   Is it safe for you to go home? Y       Fall Risk  Fall Risk Assessment, last 12 mths 4/12/2022   Able to walk? Yes   Fall in past 12 months? 0   Do you feel unsteady? 0   Are you worried about falling 0       ADL  ADL Assessment 9/18/2020   Feeding yourself No Help Needed   Getting from bed to chair No Help Needed   Getting dressed No Help Needed   Bathing or showering No Help Needed   Walk across the room (includes cane/walker) No Help Needed   Using the telphone No Help Needed   Taking your medications No Help Needed   Preparing meals No Help Needed   Managing money (expenses/bills) No Help Needed   Moderately strenuous housework (laundry) No Help Needed   Shopping for personal items (toiletries/medicines) No Help Needed   Shopping for groceries No Help Needed   Driving No Help Needed   Climbing a flight of stairs No Help Needed   Getting to places beyond walking distances No Help Needed       Health Maintenance reviewed and discussed and ordered per Provider.     Health Maintenance Due   Topic Date Due    COVID-19 Vaccine (1) Never done    Shingrix Vaccine Age 50> (1 of 2) Never done    Medicare Yearly Exam  09/19/2021   .  1. \"Have you been to the ER, urgent care clinic since your last visit? Hospitalized since your last visit? \" No    2. \"Have you seen or consulted any other health care providers outside of the [address] [place] [address] since your last visit? \" No     3. For patients aged 39-70: Has the patient had a colonoscopy / FIT/ Cologuard? NA - based on age or sex  If the patient is female:    3. For patients aged 41-77: Has the patient had a mammogram within the past 2 years? NA - based on age or sex      11. For patients aged 21-65: Has the patient had a pap smear?  NA - based on age or sex

## 2022-04-14 ENCOUNTER — TELEPHONE (OUTPATIENT)
Dept: INTERNAL MEDICINE CLINIC | Age: 87
End: 2022-04-14

## 2022-04-14 NOTE — TELEPHONE ENCOUNTER
Patient is asking to speak with a nurse regarding her medications. She was given a med list when she was here on Tues and there are some meds on the list that she isn't taking.

## 2022-04-26 DIAGNOSIS — Z78.0 POSTMENOPAUSAL ESTROGEN DEFICIENCY: ICD-10-CM

## 2022-06-01 ENCOUNTER — APPOINTMENT (OUTPATIENT)
Dept: PHYSICAL THERAPY | Age: 87
End: 2022-06-01

## 2022-06-13 ENCOUNTER — HOSPITAL ENCOUNTER (OUTPATIENT)
Dept: PHYSICAL THERAPY | Age: 87
Discharge: HOME | End: 2022-06-13
Payer: MEDICARE

## 2022-06-13 PROCEDURE — 97162 PT EVAL MOD COMPLEX 30 MIN: CPT

## 2022-06-13 PROCEDURE — 97110 THERAPEUTIC EXERCISES: CPT

## 2022-06-13 PROCEDURE — 97530 THERAPEUTIC ACTIVITIES: CPT

## 2022-06-13 NOTE — PROGRESS NOTES
[facility]  [address]  [place], [address]  [phone] fax    Plan of Care/ Statement of Necessity for Physical Therapy Services    Patient name: Nyle Phoenix Start of Care: 2022   Referral source: Juliann Yun : 1935    Medical Diagnosis: Hip pain, left [M25.552]  Payor: [place] MEDICARE / Plan: [place] MEDICARE PART A & B / Product Type: Medicare /  Onset Date: About 6 months ago    Treatment Diagnosis: LBP   Prior Hospitalization: see medical history Provider#: [id number]   Medications: Verified on Patient summary List    Comorbidities: Arthritis, hx LBP, lumbar stenosis   Prior Level of Function: The patient reports that she was able to ambulate without limitation prior to onset. The Plan of Care and following information is based on the information from the initial evaluation. Assessment/ key information: The patient is an 82year old female with a chief complaint of right sided low back pain. She has had injections which helped the left side of her back, but she reports she continues to have difficulty with standing for longer than 30 minutes, and is unable to shop noting she has to sit down constantly. She has pain with standing, and her pain is relieved with sitting, especially forward trunk flexion. The patient has had a CT which showed stenosis at multi level. She has impairments consistent with the diagnosis consisting of pain, decreased ROM, decreased flexibility, decreased strength, decreased ADL ease, and limited ease of standing. The patient will benefit from skilled PT in order address the aforementioned impairments.     Evaluation Complexity History MEDIUM  Complexity : 1-2 comorbidities / personal factors will impact the outcome/ POC ; Examination MEDIUM Complexity : 3 Standardized tests and measures addressing body structure, function, activity limitation and / or participation in recreation  ;Presentation MEDIUM Complexity : Evolving with changing characteristics  ; Clinical Decision Making MEDIUM Complexity : FOTO score of 26-74 2Overall Complexity Rating: MEDIUM  Problem List: pain affecting function, decrease ROM, decrease strength, decrease ADL/ functional abilitiies, decrease activity tolerance and decrease flexibility/ joint mobility   Treatment Plan may include any combination of the following: Therapeutic exercise, Therapeutic activities, Neuromuscular re-education, Manual therapy, Patient education, Self Care training, Functional mobility training and Home safety training  Patient / Family readiness to learn indicated by: asking questions, trying to perform skills and interest  Persons(s) to be included in education: patient (P)  Barriers to Learning/Limitations: None  Patient Goal (s): get out of pain  Patient Self Reported Health Status: good  Rehabilitation Potential: fair - good    Short Term Goals: To be accomplished in 2 weeks:   1. The patient will demonstrate independence and compliance with HEP to maximize therapeutic benefit. 2. The patient will report no greater than 5/10 pain in order to improve ease of chores. Long Term Goals: To be accomplished in 4 weeks:   1. The patient will improve FOTO score to 53 to maximize ease of ADLs. 2. The patient will demonstrate negative felisa test to reduce lumbar extension tendency in standing to maximize ease of standing. 3. The patient will improve hip ABD strength to 5/5 MMT to maximize stability in stance. 4. The patient will report being able to shop without having to sit down or lean over shopping cart to improve ease of chore production. Frequency / Duration: Patient to be seen 2 times per week for 4 weeks.     Patient/ Caregiver education and instruction: Diagnosis, prognosis, self care, activity modification and exercises   [x]  Plan of care has been reviewed with PTA    Certification Period: 6/13/2022 - 7/12/2022  Raysa Fernando PT 6/13/2022 1:41 PM    ________________________________________________________________________    I certify that the above Therapy Services are being furnished while the patient is under my care. I agree with the treatment plan and certify that this therapy is necessary.     [de-identified] Signature:____________________  Date:____________Time: _________     Rosalia Bocanegra PA-C  Please sign and return to [facility] [address] [place] [address] & [facility] [address]  [place], [facility] [address]  [phone] fax

## 2022-06-13 NOTE — PROGRESS NOTES
PT DAILY TREATMENT NOTE     Patient Name: Carmen Ho  Date:2022  : 1935  [x]  Patient  Verified  Payor: Iris Ill / Plan: [place] MEDICARE PART A & B / Product Type: Medicare /    In time:12:46  Out time:1:34  Total Treatment Time (min): 48  Visit #: 1 of 8    Medicare/BCBS Only   Total Timed Codes (min):  25 1:1 Treatment Time: 25       Treatment Area: Hip pain, left [M25.552]    SUBJECTIVE  Pain Level (0-10 scale): 8/10  Any medication changes, allergies to medications, adverse drug reactions, diagnosis change, or new procedure performed?: [x] No    [] Yes (see summary sheet for update)  Subjective functional status/changes:   [] No changes reported  The patient reports that her back pain limits her standing ability so much. OBJECTIVE  23 min []Eval                  []Re-Eval       15 min Therapeutic Exercise:  [x] See flow sheet :   Rationale: increase ROM and increase strength to improve the patients ability to improve ADL ease. 10 min Therapeutic Activity:  []  See flow sheet : education regarding proper supine to sit instruction. Rationale: increase ROM and increase strength  to improve the patients ability to improve ADL ease.          With   [] TE   [] TA   [] neuro   [] other: Patient Education: [x] Review HEP    [] Progressed/Changed HEP based on:   [] positioning   [] body mechanics   [] transfers   [] heat/ice application    [] other:      Other Objective/Functional Measures: See IE     Pain Level (0-10 scale) post treatment: 8/10    ASSESSMENT/Changes in Function: See POC    Patient will continue to benefit from skilled PT services to modify and progress therapeutic interventions, address functional mobility deficits, address ROM deficits, address strength deficits, analyze and address soft tissue restrictions, analyze and cue movement patterns, analyze and modify body mechanics/ergonomics, assess and modify postural abnormalities and instruct in home and community integration to attain remaining goals. []  See Plan of Care  []  See progress note/recertification  []  See Discharge Summary         Progress towards goals / Updated goals:  Short Term Goals: To be accomplished in 2 weeks:              1. The patient will demonstrate independence and compliance with HEP to maximize therapeutic benefit. IE: issued HEP               2. The patient will report no greater than 5/10 pain in order to improve ease of chores. IE: 80  Long Term Goals: To be accomplished in 4 weeks:              1. The patient will improve FOTO score to 53 to maximize ease of ADLs. IE: 39              2. The patient will demonstrate negative felisa test to reduce lumbar extension tendency in standing to maximize ease of standing. IE: positive bilaterally              3. The patient will improve hip ABD strength to 5/5 MMT to maximize stability in stance. IE: 4/5 MMT B              4. The patient will report being able to shop without having to sit down or lean over shopping cart to improve ease of chore production. IE: currently has to sit multiple times, and lean over shopping cart.      PLAN  [x]  Upgrade activities as tolerated     []  Continue plan of care  []  Update interventions per flow sheet       []  Discharge due to:_  []  Other:_      Olegario Stanton, PT 6/13/2022  1:51 PM    Future Appointments   Date Time Provider Delmer Montano   6/14/2022  3:20 PM Frankey Parks, PA-C [address] [facility]   6/24/2022  8:30 AM Cloyce Pablo, PTA [facility]   6/27/2022 12:45 PM Cloyce Pablo, PTA [facility]   7/8/2022  9:15 AM Cloyce Pablo, PTA [facility]   7/11/2022  9:45 AM Cloyce Pablo, PTA [facility]   7/15/2022  9:15 AM Cristiano Kramer, PT [facility]   7/18/2022  9:45 AM Cloyce Pablo, PTA [facility]   7/22/2022  9:15 AM Cristiano Kramer, PT [facility]   7/25/2022  9:45 AM Cloyce Pablo, PTA [facility]   10/11/2022  9:55 AM IO LAB VISIT [facility] AM[facility]   10/18/2022 10:00 AM Belinda Dang MD [facility]

## 2022-06-24 ENCOUNTER — HOSPITAL ENCOUNTER (OUTPATIENT)
Dept: PHYSICAL THERAPY | Age: 87
Discharge: HOME | End: 2022-06-24
Payer: MEDICARE

## 2022-06-24 PROCEDURE — 97012 MECHANICAL TRACTION THERAPY: CPT

## 2022-06-24 PROCEDURE — 97112 NEUROMUSCULAR REEDUCATION: CPT

## 2022-06-24 PROCEDURE — 97110 THERAPEUTIC EXERCISES: CPT

## 2022-06-24 NOTE — PROGRESS NOTES
PT DAILY TREATMENT NOTE     Patient Name: Sukhwinder Moe  Date:2022  : 1935  [x]  Patient  Verified  Payor: Brennen Lopez / Plan: [place] MEDICARE PART A & B / Product Type: Medicare /    In time:8:31  Out time:9:26  Total Treatment Time (min): 55  Visit #: 2 of 8    Medicare/BCBS Only   Total Timed Codes (min):  45 1:1 Treatment Time:  45       Treatment Area: Hip pain, left [M25.552]    SUBJECTIVE  Pain Level (0-10 scale): 8/10  Any medication changes, allergies to medications, adverse drug reactions, diagnosis change, or new procedure performed?: [x] No    [] Yes (see summary sheet for update)  Subjective functional status/changes:   [] No changes reported  \"It's been really bad this week. The exercises don't bother me at all. \"    OBJECTIVE    Modality rationale: decrease pain and increase tissue extensibility to improve the patients ability to perform ADL's.    Min Type Additional Details    [] Estim:  []Unatt       []IFC  []Premod                        []Other:  []w/ice   []w/heat  Position:  Location:    [] Estim: []Att    []TENS instruct  []NMES                    []Other:  []w/US   []w/ice   []w/heat  Position:  Location:   10 [x]  Traction: [] Cervical       [x]Lumbar                       [] Prone          [x]Supine                       []Intermittent   [x]Continuous Lbs: 45# with low table  [] before manual  [] after manual    []  Ultrasound: []Continuous   [] Pulsed                           []1MHz   []3MHz W/cm2:  Location:    []  Iontophoresis with dexamethasone         Location: [] Take home patch   [] In clinic    []  Ice     []  heat  []  Ice massage  []  Laser   []  Anodyne Position:  Location:    []  Laser with stim  []  Other:  Position:  Location:    []  Vasopneumatic Device    []  Right     []  Left  Pre-treatment girth:  Post-treatment girth:  Measured at (location):  Pressure:       [] lo [] med [] hi   Temperature: [] lo [] med [] hi   [] Skin assessment post-treatment: []intact []redness- no adverse reaction    []redness - adverse reaction:     35 min Therapeutic Exercise:  [x] See flow sheet :   Rationale: increase ROM and increase strength to improve the patients ability to perform ADL's. 10 min Neuromuscular Re-education:  [x]  See flow sheet : Hook-lying marching and deadbug, seated core stabs. Rationale: increase strength, improve coordination and increase proprioception  to improve the patients ability to perform functional activities. With   [x] TE   [] TA   [] neuro   [] other: Patient Education: [x] Review HEP    [] Progressed/Changed HEP based on:   [] positioning   [] body mechanics   [] transfers   [] heat/ice application    [] other:      Other Objective/Functional Measures: Initiated exercises per flow sheet. Pain Level (0-10 scale) post treatment: 0/10    ASSESSMENT/Changes in Function: First F/U visit. CC is posterior Left hip pain which radiates into mid-thigh. Tight (B) hip flexors. Pt fully participated in treatment and is motivated. Low back pain and Left sided hip symptoms decreased gradually with supine exercises. Pt stated \"this actually feels good\" while on traction. Continue PT to decrease mm restrictions, increase core stability to improve walking/standing tolerance. Patient will continue to benefit from skilled PT services to modify and progress therapeutic interventions, address functional mobility deficits, address ROM deficits, address strength deficits, analyze and address soft tissue restrictions, analyze and cue movement patterns and analyze and modify body mechanics/ergonomics to attain remaining goals. [x]  See Plan of Care  []  See progress note/recertification  []  See Discharge Summary         Progress towards goals / Updated goals:  Short Term Goals: To be accomplished in 2 weeks:              6. The patient will demonstrate independence and compliance with HEP to maximize therapeutic benefit.               IE: issued HEP   Current: Met, pt reports compliance. 6/24/2022               2. The patient will report no greater than 5/10 pain in order to improve ease of chores. IE: [facility] be accomplished in 4 weeks:              1. The patient will improve FOTO score to 53 to maximize ease of ADLs. IE: 39              2. The patient will demonstrate negative felisa test to reduce lumbar extension tendency in standing to maximize ease of standing. IE: positive bilaterally              3. The patient will improve hip ABD strength to 5/5 MMT to maximize stability in stance. IE: 4/5 MMT B              4. The patient will report being able to shop without having to sit down or lean over shopping cart to improve ease of chore production. IE: currently has to sit multiple times, and lean over shopping cart.      PLAN  []  Upgrade activities as tolerated     [x]  Continue plan of care  []  Update interventions per flow sheet       []  Discharge due to:_  []  Other:_      Bianka Reddy, PTA 6/24/2022  8:39 AM    Future Appointments   Date Time Provider Delmer Montano   6/27/2022 12:45 PM Russella Arabia, PTA [facility]   7/8/2022  9:15 AM Russella Arabia, PTA [facility]   7/11/2022  9:45 AM Russella Arabia, PTA [facility]   7/15/2022  9:15 AM Bedelia Oka, PT [facility]   7/18/2022  9:45 AM Russella Arabia, PTA [facility]   7/22/2022  9:15 AM Bedelia Oka, PT [facility]   7/25/2022  9:45 AM Russella Arabia, PTA [facility]   9/6/2022  9:15 AM Zhou Rees MD [facility] SCHED   10/4/2022 10:40 AM Kuhn Counter, PT [facility]   10/11/2022  9:55 AM [facility]   10/18/2022  9:20 AM Kuhn Counter, PT [facility] SCHED   10/18/2022 11:00 AM Dima Dodd MD [facility]

## 2022-06-27 ENCOUNTER — HOSPITAL ENCOUNTER (OUTPATIENT)
Dept: PHYSICAL THERAPY | Age: 87
Discharge: HOME | End: 2022-06-27
Payer: MEDICARE

## 2022-06-27 PROCEDURE — 97012 MECHANICAL TRACTION THERAPY: CPT

## 2022-06-27 PROCEDURE — 97112 NEUROMUSCULAR REEDUCATION: CPT

## 2022-06-27 PROCEDURE — 97110 THERAPEUTIC EXERCISES: CPT

## 2022-06-27 NOTE — PROGRESS NOTES
PT DAILY TREATMENT NOTE     Patient Name: Magui Salcido  Date:2022  : 1935  [x]  Patient  Verified   Payor: Osiris Arthur / Plan: [place] MEDICARE PART A & B / Product Type: Medicare /    In time:12:47  Out time:1:40  Total Treatment Time (min): 53  Visit #: 3 of 8    Medicare/BCBS Only   Total Timed Codes (min):  43 1:1 Treatment Time:  35       Treatment Area: Hip pain, left [M25.552]    SUBJECTIVE  Pain Level (0-10 scale): 7/10  Any medication changes, allergies to medications, adverse drug reactions, diagnosis change, or new procedure performed?: [x] No    [] Yes (see summary sheet for update)  Subjective functional status/changes:   [] No changes reported  \"I had a really good day after the last visit but the pain came back with a vengeance the day after that. \"    OBJECTIVE    Modality rationale: decrease pain and increase tissue extensibility to improve the patients ability to perform ADL's.    Min Type Additional Details    [] Estim:  []Unatt       []IFC  []Premod                        []Other:  []w/ice   []w/heat  Position:  Location:    [] Estim: []Att    []TENS instruct  []NMES                    []Other:  []w/US   []w/ice   []w/heat  Position:  Location:   10 [x]  Traction: [] Cervical       [x]Lumbar                       [] Prone          [x]Supine                       []Intermittent   [x]Continuous Lbs: 45#  [] before manual  [] after manual    []  Ultrasound: []Continuous   [] Pulsed                           []1MHz   []3MHz W/cm2:  Location:    []  Iontophoresis with dexamethasone         Location: [] Take home patch   [] In clinic    []  Ice     []  heat  []  Ice massage  []  Laser   []  Anodyne Position:  Location:    []  Laser with stim  []  Other:  Position:  Location:    []  Vasopneumatic Device    []  Right     []  Left  Pre-treatment girth:  Post-treatment girth:  Measured at (location):  Pressure:       [] lo [] med [] hi   Temperature: [] lo [] med [] hi   [] Skin assessment post-treatment:  []intact []redness- no adverse reaction    []redness - adverse reaction:     33 min Therapeutic Exercise:  [x] See flow sheet :   Rationale: increase ROM and increase strength to improve the patients ability to perform ADL's. 10 min Neuromuscular Re-education:  [x]  See flow sheet : Hook-lying marching and deadbug, seated core stabs. Rationale: increase strength, improve coordination and increase proprioception  to improve the patients ability to perform functional activities. With   [x] TE   [] TA   [] neuro   [] other: Patient Education: [x] Review HEP    [] Progressed/Changed HEP based on:   [] positioning   [] body mechanics   [] transfers   [] heat/ice application    [] other:      Other Objective/Functional Measures: Added SLR with TA 10 reps each. Pain Level (0-10 scale) post treatment: 0/10    ASSESSMENT/Changes in Function: Pt responded well to  traction previous visit. Reported having relief for 1.5 days. Performs most exercises without complaints of pain, pain/tension with Sudhakar stretch (B). Denied pain post-treatment. Continue PT along with mechanical traction to decrease symptoms to improve pt's ability to perform ADL's. Patient will continue to benefit from skilled PT services to modify and progress therapeutic interventions, address functional mobility deficits, address ROM deficits, address strength deficits, analyze and address soft tissue restrictions, analyze and cue movement patterns and analyze and modify body mechanics/ergonomics to attain remaining goals. [x]  See Plan of Care  []  See progress note/recertification  []  See Discharge Summary         Progress towards goals / Updated goals:  Short Term Goals: To be accomplished in 2 weeks:              2. The patient will demonstrate independence and compliance with HEP to maximize therapeutic benefit.              IE: issued HEP              Current: Met, pt reports compliance. 6/24/2022               2. The patient will report no greater than 5/10 pain in order to improve ease of chores.              IE: [place] be accomplished in 4 weeks:              1. The patient will improve FOTO score to 53 to maximize ease of ADLs.                IE: 36              2. The patient will demonstrate negative felisa test to reduce lumbar extension tendency in standing to maximize ease of standing.              IE: positive bilaterally              3. The patient will improve hip ABD strength to 5/5 MMT to maximize stability in stance.              IE: 4/5 MMT B              4.  The patient will report being able to shop without having to sit down or lean over shopping cart to improve ease of chore production.              IE: currently has to sit multiple times, and lean over shopping cart.     PLAN  []  Upgrade activities as tolerated     [x]  Continue plan of care  []  Update interventions per flow sheet       []  Discharge due to:_  []  Other:_      Gonsalo Hugo PTA 6/27/2022  12:43 PM    Future Appointments   Date Time Provider Delmre Charmaine   6/27/2022 12:45 PM Sukhwinder Imus, PTA [facility]   7/8/2022  9:15 AM Sukhwinder Imus, PTA [facility]   7/11/2022  9:45 AM Sukhwinder Imus, PTA [facility]   7/15/2022  9:15 AM [facility]   7/18/2022  9:45 AM Sukhwinder Imus, PTA [facility]   7/22/2022  9:15 AM [facility], PT [facility]   7/25/2022  9:45 AM Sukhwinder Imus, PTA [facility]   9/6/2022  9:15 AM MD Romi Austin   10/4/2022 10:40 AM Yvon Varela, [facility]   10/11/2022  9:55 AM [facility]   10/18/2022  9:20 AM Yvon Varela, PT [facility]   10/18/2022 11:00 AM Sebas Landa MD [facility]

## 2022-07-08 ENCOUNTER — HOSPITAL ENCOUNTER (OUTPATIENT)
Dept: PHYSICAL THERAPY | Age: 87
Discharge: HOME | End: 2022-07-08
Payer: MEDICARE

## 2022-07-08 PROCEDURE — 97112 NEUROMUSCULAR REEDUCATION: CPT

## 2022-07-08 PROCEDURE — 97012 MECHANICAL TRACTION THERAPY: CPT

## 2022-07-08 PROCEDURE — 97110 THERAPEUTIC EXERCISES: CPT

## 2022-07-08 NOTE — PROGRESS NOTES
PT DAILY TREATMENT NOTE     Patient Name: Ruben Mims  ZVUQ:[id number]  : 1935  [x]  Patient  Verified   Payor: Rm Keep / Plan: [place] MEDICARE PART A & B / Product Type: Medicare /    In time:9:13  Out time:10:03  Total Treatment Time (min): 50  Visit #: 4 of 8    Medicare/BCBS Only   Total Timed Codes (min):  40 1:1 Treatment Time:  40       Treatment Area: Hip pain, left [M25.552]    SUBJECTIVE  Pain Level (0-10 scale): 7/10   Any medication changes, allergies to medications, adverse drug reactions, diagnosis change, or new procedure performed?: [x] No    [] Yes (see summary sheet for update)  Subjective functional status/changes:   [] No changes reported  \"My left hip has been giving me a fit. It must be nerves or something. \"    OBJECTIVE    Modality rationale: decrease pain and increase tissue extensibility to improve the patients ability to perform ADL's and transfers.    Min Type Additional Details    [] Estim:  []Unatt       []IFC  []Premod                        []Other:  []w/ice   []w/heat  Position:  Location:    [] Estim: []Att    []TENS instruct  []NMES                    []Other:  []w/US   []w/ice   []w/heat  Position:  Location:   10 [x]  Traction: [] Cervical       [x]Lumbar                       [] Prone          [x]Supine                       []Intermittent   [x]Continuous Lbs: 45#  [] before manual  [] after manual    []  Ultrasound: []Continuous   [] Pulsed                           []1MHz   []3MHz W/cm2:  Location:    []  Iontophoresis with dexamethasone         Location: [] Take home patch   [] In clinic    []  Ice     []  heat  []  Ice massage  []  Laser   []  Anodyne Position:  Location:    []  Laser with stim  []  Other:  Position:  Location:    []  Vasopneumatic Device    []  Right     []  Left  Pre-treatment girth:  Post-treatment girth:  Measured at (location):  Pressure:       [] lo [] med [] hi   Temperature: [] lo [] med [] hi   [] Skin assessment post-treatment: []intact []redness- no adverse reaction    []redness - adverse reaction:     25 min Therapeutic Exercise:  [x] See flow sheet :   Rationale: increase ROM and increase strength to improve the patients ability to perform ADL's. 15 min Neuromuscular Re-education:  [x]  See flow sheet : Hook-lying marching, deadbug, seated core stabs. Long axis traction to Left LE. Pt hook-lying. Rationale: increase ROM, increase strength, improve coordination and increase proprioception  to improve the patients ability to perform functional activities. With   [x] TE   [] TA   [] neuro   [] other: Patient Education: [x] Review HEP    [] Progressed/Changed HEP based on:   [] positioning   [] body mechanics   [] transfers   [] heat/ice application    [] other:      Other Objective/Functional Measures: Added mahendra disc to seated stabs. Sudhakar stretch (+) (B). Pain Level (0-10 scale) post treatment: 0/10    ASSESSMENT/Changes in Function: Pt reports pain level at highest 7-8/10 in the last few days. No subjective change in low back symptoms to date per pt. Left hip pain has increased, pt stated \"it's been hurting a lot but it's gotten worse. \" Pt continues to report a good response with L/S mechanical traction, 1-2 days of relief however carryover has been limited. Denied pain post-exercises and long axis traction, prior to mechanical traction today. Continue PT to increase mobility/flexibility and increase core stability to improve ease of performing daily tasks. Patient will continue to benefit from skilled PT services to modify and progress therapeutic interventions, address functional mobility deficits, address ROM deficits, address strength deficits, analyze and address soft tissue restrictions, analyze and cue movement patterns and analyze and modify body mechanics/ergonomics to attain remaining goals.      [x]  See Plan of Care  []  See progress note/recertification  []  See Discharge Summary         Progress towards goals / Updated goals:  Short Term Goals: To be accomplished in 2 weeks:              8. The patient will demonstrate independence and compliance with HEP to maximize therapeutic benefit.              IE: issued HEP              CKQYYFN: Met, pt reports compliance. 6/24/2022               2. The patient will report no greater than 5/10 pain in order to improve ease of chores.              HU: 8/10   Current: 7-8/10. 7/8/2022  Long Term Goals: To be accomplished in 4 weeks:              1. The patient will improve FOTO score to 53 to maximize ease of ADLs.                IE: 36              2. The patient will demonstrate negative sudhakar test to reduce lumbar extension tendency in standing to maximize ease of standing.               IE: positive bilaterally   Current: Sudhakar stretch (+) (B). 7/8/2022              3. The patient will improve hip ABD strength to 5/5 MMT to maximize stability in stance.              IE: 4/5 MMT B              4.  The patient will report being able to shop without having to sit down or lean over shopping cart to improve ease of chore production.              IE: currently has to sit multiple times, and lean over shopping cart.     PLAN  []  Upgrade activities as tolerated     [x]  Continue plan of care  []  Update interventions per flow sheet       []  Discharge due to:_  []  Other:_      Katrin Batres, PTA 7/8/2022  9:10 AM    Future Appointments   Date Time Provider Delmer Montano   7/8/2022  9:15 AM Ramses Leonardk, PTA [facility]   7/11/2022  9:45 AM Birdena Sleek, PTA [facility]   7/15/2022  9:15 AM Erica Batista, PT [facility]   7/18/2022  9:45 AM Birdena Sleek, PTA [facility]   7/22/2022  9:15 AM Erica Batista, PT [facility]   7/25/2022  9:45 AM Birdsarah Sleek, PTA [facility]   9/6/2022  9:15 AM Shellie Howell MD [facility]   10/4/2022 10:40 AM Marquis Butcher, [facility]   10/11/2022  9:55 AM [facility] LAB VISIT [facility] AM[facility]   10/18/2022  9:20 AM Mary You, PT [facility] SCHED   10/18/2022 11:00 AM Dontae Callejas MD [facility] AM[facility]

## 2022-07-11 ENCOUNTER — HOSPITAL ENCOUNTER (OUTPATIENT)
Dept: PHYSICAL THERAPY | Age: 87
Discharge: HOME | End: 2022-07-11
Payer: MEDICARE

## 2022-07-11 PROCEDURE — 97112 NEUROMUSCULAR REEDUCATION: CPT

## 2022-07-11 PROCEDURE — 97012 MECHANICAL TRACTION THERAPY: CPT

## 2022-07-11 PROCEDURE — 97110 THERAPEUTIC EXERCISES: CPT

## 2022-07-11 NOTE — PROGRESS NOTES
PT DAILY TREATMENT NOTE     Patient Name: Nancy Forman  Date:2022  : 1935  [x]  Patient  Verified  Payor: Zeeshan Risk / Plan: [place] MEDICARE PART A & B / Product Type: Medicare /    In time:9:48  Out time:10:36  Total Treatment Time (min): 48  Visit #: 5 of 8    Medicare/BCBS Only   Total Timed Codes (min):  38 1:1 Treatment Time:  38       Treatment Area: Hip pain, left [M25.552]    SUBJECTIVE  Pain Level (0-10 scale): 0/10  Any medication changes, allergies to medications, adverse drug reactions, diagnosis change, or new procedure performed?: [x] No    [] Yes (see summary sheet for update)  Subjective functional status/changes:   [] No changes reported  \"Had some pain yesterday but I contribute it to the weather. No pain this morning. \"    OBJECTIVE     Modality rationale: decrease pain and increase tissue extensibility to improve the patients ability to perform ADL's.    Min Type Additional Details    [] Estim:  []Unatt       []IFC  []Premod                        []Other:  []w/ice   []w/heat  Position:  Location:    [] Estim: []Att    []TENS instruct  []NMES                    []Other:  []w/US   []w/ice   []w/heat  Position:  Location:   10 [x]  Traction: [] Cervical       [x]Lumbar                       [] Prone          [x]Supine                       []Intermittent   [x]Continuous Lbs: 50#  [] before manual  [] after manual    []  Ultrasound: []Continuous   [] Pulsed                           []1MHz   []3MHz W/cm2:  Location:    []  Iontophoresis with dexamethasone         Location: [] Take home patch   [] In clinic    []  Ice     []  heat  []  Ice massage  []  Laser   []  Anodyne Position:  Location:    []  Laser with stim  []  Other:  Position:  Location:    []  Vasopneumatic Device    []  Right     []  Left  Pre-treatment girth:  Post-treatment girth:  Measured at (location):  Pressure:       [] lo [] med [] hi   Temperature: [] lo [] med [] hi   [] Skin assessment post-treatment: []intact []redness- no adverse reaction    []redness - adverse reaction:     23 min Therapeutic Exercise:  [x] See flow sheet :   Rationale: increase ROM and increase strength to improve the patients ability to perform ADL's. 15 min Neuromuscular Re-education:  [x]  See flow sheet : Hook-lying marching, deadbug, seated core stabs. Long axis traction to Left LE. Pt hook-lying. Rationale: increase strength, improve coordination and increase proprioception  to improve the patients ability to perform functional activities. With   [x] TE   [] TA   [] neuro   [] other: Patient Education: [x] Review HEP    [] Progressed/Changed HEP based on:   [] positioning   [] body mechanics   [] transfers   [] heat/ice application    [] other:      Other Objective/Functional Measures: MMT Hip abd Left 4+/5, Right 4+/5. Increased SLR to 15 reps. Increased traction from 45# to 50#. Pain Level (0-10 scale) post treatment: 0/10    ASSESSMENT/Changes in Function: Demonstrates improved (B) hip abd strength. Pt reported Left hip symptoms from previous visit have resolved since the treatment. Decreased in low back pain frequency. Continue PT to increase strength/flexibility to improve ease of performing daily tasks and to maintain symptoms. Patient will continue to benefit from skilled PT services to modify and progress therapeutic interventions, address functional mobility deficits, address ROM deficits, address strength deficits, analyze and address soft tissue restrictions, analyze and cue movement patterns and analyze and modify body mechanics/ergonomics to attain remaining goals. [x]  See Plan of Care  []  See progress note/recertification  []  See Discharge Summary         Progress towards goals / Updated goals:  Short Term Goals: To be accomplished in 2 weeks:              0.  The patient will demonstrate independence and compliance with HEP to maximize therapeutic benefit.              IE: issued HEP              ERIKI: Met, pt reports compliance. 6/24/2022               2. The patient will report no greater than 5/10 pain in order to improve ease of chores.              SI: 8/10              Current: 7-8/10. 7/8/2022  Long Term Goals: To be accomplished in 4 weeks:              1. The patient will improve FOTO score to 53 to maximize ease of ADLs.                IE: 36              2. The patient will demonstrate negative sudhaakr test to reduce lumbar extension tendency in standing to maximize ease of standing.               IE: positive bilaterally              Current: Sudhakar stretch (+) (B). 7/8/2022              3. The patient will improve hip ABD strength to 5/5 MMT to maximize stability in stance.              IE: 4/5 MMT B   Current: MMT Hip abd Left 4+/5, Right 4+/5. 7/11/2022              4.  The patient will report being able to shop without having to sit down or lean over shopping cart to improve ease of chore production.              IE: currently has to sit multiple times, and lean over shopping cart.     PLAN  []  Upgrade activities as tolerated     [x]  Continue plan of care  []  Update interventions per flow sheet       []  Discharge due to:_  []  Other:_      Chago Zuñiga, PTA 7/11/2022  9:52 AM    Future Appointments   Date Time Provider Delmer Montano   7/15/2022  9:15 [facility]   7/18/2022  9:45 AM Valentine Bacon, PTA [facility]   7/22/2022  9:15 AM Alee Suarez PT [facility]   7/25/2022  9:45 AM Valentine Bacon, PTA [facility]   9/6/2022  9:15 AM MD Brittni Monroe SCHED   10/4/2022 10:40 AM Angy Murdock, PT [facility] SCHED   10/11/2022  9:55 AM [facility]   10/18/2022  9:20 AM Angy Murdock, PT [facility] SCHED   10/18/2022 11:00 AM Tania Hernadez MD [facility]

## 2022-07-15 ENCOUNTER — HOSPITAL ENCOUNTER (OUTPATIENT)
Dept: PHYSICAL THERAPY | Age: 87
Discharge: HOME | End: 2022-07-15
Payer: MEDICARE

## 2022-07-15 PROCEDURE — 97112 NEUROMUSCULAR REEDUCATION: CPT

## 2022-07-15 PROCEDURE — 97110 THERAPEUTIC EXERCISES: CPT

## 2022-07-15 PROCEDURE — 97012 MECHANICAL TRACTION THERAPY: CPT

## 2022-07-15 NOTE — PROGRESS NOTES
[facility]  [address]  [place], [address]  [phone] fax    Continued Plan of Care/ Re-certification for Physical Therapy Services    Patient name: Hernando Eden Start of Care: 2022   Referral source: Christopher Fowler : 1935               Medical Diagnosis: Hip pain, left [M25.552]  Payor: [place] MEDICARE / Plan: [place] MEDICARE PART A & B / Product Type: Medicare /  Onset Date: About 6 months ago               Treatment Diagnosis: LBP   Prior Hospitalization: see medical history Provider#: [id number]   Medications: Verified on Patient summary List    Comorbidities: Arthritis, hx LBP, lumbar stenosis   Prior Level of Function: The patient reports that she was able to ambulate without limitation prior to onset. Visits from Start of Care: 6    Missed Visits: 0    The Plan of Care and following information is based on the patient's current status:  Short Term Goals: To be accomplished in 2 weeks:              3. The patient will demonstrate independence and compliance with HEP to maximize therapeutic benefit.              IE: issued HEP              DZZUVXP: Met, pt reports compliance. 2022               2. The patient will report no greater than 5/10 pain in order to improve ease of chores.              IE: 8/10              Current: 7-8/10. 2022  Long Term Goals: To be accomplished in 4 weeks:              1. The patient will improve FOTO score to 53 to maximize ease of ADLs.                IE: 36              Current: Met 56              2. The patient will demonstrate negative felisa test to reduce lumbar extension tendency in standing to maximize ease of standing.               IE: positive bilaterally              Current: Pia Guess stretch (+) (B). 2022              3.  The patient will improve hip ABD strength to 5/5 MMT to maximize stability in stance.              IE: 4/5 MMT B              Current: MMT Hip abd Left 4+/5, Right 4+/5. 7/11/2022              4. The patient will report being able to shop without having to sit down or lean over shopping cart to improve ease of chore production.              IE: currently has to sit multiple times, and lean over shopping cart.               Current: has not tried shopping yet 7/15/2022    Key functional changes: The patient met her FOTO score goal noting a significant in overall function. Her hip flexors remain positive but improved and her hip strength has improved since onset. She will continue to benefit from PT in order to progress strength and maximize standing and walking ease. Problems/ barriers to goal attainment: severity of lumbar stenosis     Problem List: pain affecting function, decrease ROM, decrease strength, impaired gait/ balance, decrease ADL/ functional abilitiies, decrease activity tolerance, decrease flexibility/ joint mobility and decrease transfer abilities    Treatment Plan: Therapeutic exercise, Therapeutic activities, Neuromuscular re-education, Physical agent/modality, Gait/balance training, Manual therapy, Patient education, Self Care training, Functional mobility training, Home safety training and Stair training     Patient Goal (s) has been updated and includes: none     Goals for this certification period to be accomplished in 4 weeks:     1. The patient will report no greater than 5/10 pain in order to improve ease of chores.              Re-cert: 2/46              2. The patient will demonstrate negative felisa test to reduce lumbar extension tendency in standing to maximize ease of standing.               Re-cert: Paty Bolls stretch (+) (B).             3. The patient will improve hip ABD strength to 5/5 MMT to maximize stability in stance.             Re-cert: MMT Hip abd Left 4+/5, Right 4+/5.               4.  The patient will report being able to shop without having to sit down or lean over shopping cart to improve ease of chore production.              Re-cert: has not tried shopping yet    Frequency / Duration: Patient to be seen 2 times per week for 4 weeks:    Assessment / Recommendations: The patient met her FOTO score goal noting a significant in overall function. Her hip flexors remain positive but improved and her hip strength has improved since onset. She will continue to benefit from PT in order to progress strength and maximize standing and walking ease. Certification Period: 7/15/2022 - 8/13/2022    Ryan Sexton, PT 7/15/2022 12:22 PM    ________________________________________________________________________  I certify that the above Therapy Services are being furnished while the patient is under my care. I agree with the treatment plan and certify that this therapy is necessary. [] I have read the above and request that my patient continue as recommended.   [] I have read the above report and request that my patient continue therapy with the following changes/special instructions: _____________________________________________  [] I have read the above report and request that my patient be discharged from therapy    Physician's Signature:____________Date:_________TIME:________     Sherita Cantrell PA-C  ** Signature, Date and Time must be completed for valid certification **    Please sign and return to [facility] [address] [place] [address] & [address]  [address]  [place], [address]  [phone] fax

## 2022-07-15 NOTE — PROGRESS NOTES
PT DAILY TREATMENT NOTE     Patient Name: Brian Mac  Date:7/15/2022  : 1935  [x]  Patient  Verified  Payor: Staci Reyna / Plan: VA MEDICARE PART A & B / Product Type: Medicare /    In time:9:15  Out time:10:16  Total Treatment Time (min): 61  Visit #: 1 of 8    Medicare/BCBS Only   Total Timed Codes (min):  51 1:1 Treatment Time:  51       Treatment Area: Hip pain, left [M25.552]    SUBJECTIVE  Pain Level (0-10 scale): 6/10  Any medication changes, allergies to medications, adverse drug reactions, diagnosis change, or new procedure performed?: [x] No    [] Yes (see summary sheet for update)  Subjective functional status/changes:   [] No changes reported  The patient reports that she had the best night of sleep she has had in a while    OBJECTIVE  Modality rationale: muscle elongation and spinal decompression to improve the patients ability to improve ADL ease. Min Type Additional Details   10 [x]  Traction: [] Cervical       [x]Lumbar                       [] Prone          []Supine                       []Intermittent   []Continuous Lbs: 50#  [] before manual  [] after manual   [] Skin assessment post-treatment:  []intact []redness- no adverse reaction    []redness - adverse reaction:     36 min Therapeutic Exercise:  [x] See flow sheet :   Rationale: increase ROM and increase strength to improve the patients ability to improve ADL ease. 15 min Neuromuscular Re-education:  [x]  See flow sheet :   Rationale: increase ROM, increase strength and improve coordination  to improve the patients ability to improve ADL ease.      With   [] TE   [] TA   [] neuro   [] other: Patient Education: [x] Review HEP    [] Progressed/Changed HEP based on:   [] positioning   [] body mechanics   [] transfers   [] heat/ice application    [] other:      Other Objective/Functional Measures:   Ease of standing/shopping: The patient has not tried it yet though states her pain is not as severe in standing, but does \"have it's days\". FOTO: 58    Pain Level (0-10 scale) post treatment: 0/10    ASSESSMENT/Changes in Function: The patient met her FOTO score goal noting a significant in overall function. Her hip flexors remain positive but improved and her hip strength has improved since onset. She will continue to benefit from PT in order to progress strength and maximize standing and walking ease. Patient will continue to benefit from skilled PT services to modify and progress therapeutic interventions, address functional mobility deficits, address ROM deficits, address strength deficits, analyze and address soft tissue restrictions, analyze and cue movement patterns, analyze and modify body mechanics/ergonomics, assess and modify postural abnormalities and instruct in home and community integration to attain remaining goals. []  See Plan of Care  []  See progress note/recertification  []  See Discharge Summary         Progress towards goals / Updated goals:  Short Term Goals: To be accomplished in 2 weeks:              0. The patient will demonstrate independence and compliance with HEP to maximize therapeutic benefit.              IE: issued HEP              QSVHRSE: Met, pt reports compliance. 6/24/2022               2. The patient will report no greater than 5/10 pain in order to improve ease of chores.              IE: 8/10              Current: 7-8/10. 7/8/2022  Long Term Goals: To be accomplished in 4 weeks:              1. The patient will improve FOTO score to 53 to maximize ease of ADLs.                IE: 36   Current: Met 56              2. The patient will demonstrate negative felisa test to reduce lumbar extension tendency in standing to maximize ease of standing.               IE: positive bilaterally              Current: Sebas Barrios stretch (+) (B). 7/8/2022              3.  The patient will improve hip ABD strength to 5/5 MMT to maximize stability in stance.              IE: 4/5 MMT B              Current: MMT Hip abd Left 4+/5, Right 4+/5. 7/11/2022              4.  The patient will report being able to shop without having to sit down or lean over shopping cart to improve ease of chore production.              IE: currently has to sit multiple times, and lean over shopping cart.    Current: has not tried shopping yet 7/15/2022    PLAN  [x]  Upgrade activities as tolerated     []  Continue plan of care  []  Update interventions per flow sheet       []  Discharge due to:_  []  Other:_      Juan Young, PT 7/15/2022  9:24 AM    Future Appointments   Date Time Provider Delmer Montano   7/18/2022  9:45 AM Angelito Mitchell PTA [facility]   7/22/2022  9:15 AM Donald South, PT [facility]   7/25/2022  9:45 AM Angelito Mitchell PTA [facility]   9[facility]6/2022  [facility]   10/4/2022 10:40 AM Umesh Valera, PT [facility] SCHED   10/11/2022  9:55 AM [facility] LAB VISIT [facility] BS AMB   10/18/2022  9:20 AM Umesh Valera, PT [facility] SCHED   10/18/2022 11:00 AM Kristen Gamble MD [facility] BS AMB

## 2022-07-18 ENCOUNTER — HOSPITAL ENCOUNTER (OUTPATIENT)
Dept: PHYSICAL THERAPY | Age: 87
Discharge: HOME | End: 2022-07-18
Payer: MEDICARE

## 2022-07-18 PROCEDURE — 97012 MECHANICAL TRACTION THERAPY: CPT

## 2022-07-18 PROCEDURE — 97112 NEUROMUSCULAR REEDUCATION: CPT

## 2022-07-18 PROCEDURE — 97110 THERAPEUTIC EXERCISES: CPT

## 2022-07-18 NOTE — PROGRESS NOTES
PT DAILY TREATMENT NOTE     Patient Name: Alfredo Rodriguez  Date:2022  : 1935  [x]  Patient  Verified  Payor: Rock Bear / Plan: [place] MEDICARE PART A & B / Product Type: Medicare /    In time:9:46  Out time:10:34  Total Treatment Time (min): 48  Visit #: 2 of 8    Medicare/BCBS Only   Total Timed Codes (min):  38 1:1 Treatment Time:  38       Treatment Area: Hip pain, left [M25.552]    SUBJECTIVE  Pain Level (0-10 scale): 5/10  Any medication changes, allergies to medications, adverse drug reactions, diagnosis change, or new procedure performed?: [x] No    [] Yes (see summary sheet for update)  Subjective functional status/changes:   [] No changes reported  \"A little pain this morning. \"    OBJECTIVE    Modality rationale: decrease pain and increase tissue extensibility to improve the patients ability to perform ADL's.    Min Type Additional Details    [] Estim:  []Unatt       []IFC  []Premod                        []Other:  []w/ice   []w/heat  Position:  Location:    [] Estim: []Att    []TENS instruct  []NMES                    []Other:  []w/US   []w/ice   []w/heat  Position:  Location:   10 [x]  Traction: [] Cervical       [x]Lumbar                       [] Prone          [x]Supine                       []Intermittent   [x]Continuous Lbs:  50#  [] before manual  [] after manual    []  Ultrasound: []Continuous   [] Pulsed                           []1MHz   []3MHz W/cm2:  Location:    []  Iontophoresis with dexamethasone         Location: [] Take home patch   [] In clinic    []  Ice     []  heat  []  Ice massage  []  Laser   []  Anodyne Position:  Location:    []  Laser with stim  []  Other:  Position:  Location:    []  Vasopneumatic Device    []  Right     []  Left  Pre-treatment girth:  Post-treatment girth:  Measured at (location):  Pressure:       [] lo [] med [] hi   Temperature: [] lo [] med [] hi   [] Skin assessment post-treatment:  []intact []redness- no adverse reaction    []redness - adverse reaction:     23 min Therapeutic Exercise:  [x] See flow sheet :   Rationale: increase ROM and increase strength to improve the patients ability to perform ADL's. 15 min Neuromuscular Re-education:  [x]  See flow sheet : Hook-lying marching and SLR flexion with power band, seated core stabs. Rationale: increase strength, improve coordination and increase proprioception  to improve the patients ability to perform functional activities. With   [x] TE   [] TA   [] neuro   [] other: Patient Education: [x] Review HEP    [] Progressed/Changed HEP based on:   [] positioning   [] body mechanics   [] transfers   [] heat/ice application    [] other:      Other Objective/Functional Measures: Added power band to SLR's. Pain Level (0-10 scale) post treatment: 0/10    ASSESSMENT/Changes in Function: Tolerated Sudhakar stretch for 60\". Continues to respond well to mechanical traction. Continue PT to further increase strength/stability to improve ease of shopping and performing ADL's. Patient will continue to benefit from skilled PT services to modify and progress therapeutic interventions, address functional mobility deficits, address ROM deficits, address strength deficits, analyze and address soft tissue restrictions, analyze and cue movement patterns and analyze and modify body mechanics/ergonomics to attain remaining goals. [x]  See Plan of Care  []  See progress note/recertification  []  See Discharge Summary         Progress towards goals / Updated goals:  Goals for this certification period to be accomplished in 4 weeks:               1. The patient will report no greater than 5/10 pain in order to improve ease of chores.              Re-cert: 7/95              2. The patient will demonstrate negative sudhakar test to reduce lumbar extension tendency in standing to maximize ease of standing.               Re-cert: Iram March stretch (+) (B).             3.  The patient will improve hip ABD strength to 5/5 MMT to maximize stability in stance.              Re-cert: MMT Hip abd Left 4+/5, Right 4+/5.               4.  The patient will report being able to shop without having to sit down or lean over shopping cart to improve ease of chore production.              Re-cert: has not tried shopping yet    PLAN  []  Upgrade activities as tolerated     [x]  Continue plan of care  []  Update interventions per flow sheet       []  Discharge due to:_  []  Other:_      Raleigh Haynes, PTA 7/18/2022  9:49 AM    Future Appointments   Date Time Provider Delmer Haydeni   7/22/2022  9:15 AM Anita Mckay, PT [facility]   7/25/2022  9:45 AM Fabiola Gross, PTA [facility]   9/6/2022  9:15 AM Priyanka Lassiter MD [address] [place] [address]   10/4/2022 10:40 AM Christiano Childs, PT [address] [place] [address]   10/11/2022  9:55 AM I[facility] LAB VISIT IO [facility]   10/18/2022  9:20 AM Christiano Childs, PT [facility]   10/18/2022 11:00 AM Catarino Ventura MD [facility] BS AMB

## 2022-07-22 ENCOUNTER — HOSPITAL ENCOUNTER (OUTPATIENT)
Dept: PHYSICAL THERAPY | Age: 87
Discharge: HOME | End: 2022-07-22
Payer: MEDICARE

## 2022-07-22 PROCEDURE — 97012 MECHANICAL TRACTION THERAPY: CPT

## 2022-07-22 PROCEDURE — 97110 THERAPEUTIC EXERCISES: CPT

## 2022-07-22 PROCEDURE — 97112 NEUROMUSCULAR REEDUCATION: CPT

## 2022-07-22 NOTE — PROGRESS NOTES
PT DAILY TREATMENT NOTE     Patient Name: Joni Amaro  Date:2022  : 1935  [x]  Patient  Verified  Payor: Joy Kulkarni / Plan: VA MEDICARE PART A & B / Product Type: Medicare /    In time: 9:17  Out time: 9:50  Total Treatment Time (min): 40  Visit #: 3 of 8    Medicare/BCBS Only   Total Timed Codes (min):  30 1:1 Treatment Time:  10:50       Treatment Area: Hip pain, left [M25.552]    SUBJECTIVE  Pain Level (0-10 scale): 4/10  Any medication changes, allergies to medications, adverse drug reactions, diagnosis change, or new procedure performed?: [x] No    [] Yes (see summary sheet for update)  Subjective functional status/changes:   [] No changes reported  The patient states she had 2 complete days where she felt good, but yesterday was a bad day. OBJECTIVE  20 min Therapeutic Exercise:  [x] See flow sheet :   Rationale: increase ROM and increase strength to improve the patients ability to improve ADL ease. 10 min Neuromuscular Re-education:  [x]  See flow sheet :   Rationale: increase ROM, increase strength, and improve coordination  to improve the patients ability to improve ADL ease.        Modality rationale: Spinal decompression muscle elongation  to improve the patients ability to improve ease of ADLs   Min Type Additional Details   10 [x]  Traction: [] Cervical       [x]Lumbar                       [] Prone          [x]Supine                       []Intermittent   [x]Continuous Lbs: 55  [] before manual  [] after manual   [] Skin assessment post-treatment:  []intact []redness- no adverse reaction    []redness - adverse reaction:        With   [] TE   [] TA   [] neuro   [] other: Patient Education: [x] Review HEP    [] Progressed/Changed HEP based on:   [] positioning   [] body mechanics   [] transfers   [] heat/ice application    [] other:      Other Objective/Functional Measures:    Able to add pull downs during session utilizing keizer to facilitate improve standing stability. Pain Level (0-10 scale) post treatment: 0/10    ASSESSMENT/Changes in Function: Good pain control post session with the patient leaving denying pain. Recommended performance of felisa stretch in single knee to chest position which was accomplished to good effect in clinic. Patient will continue to benefit from skilled PT services to modify and progress therapeutic interventions, address functional mobility deficits, address ROM deficits, address strength deficits, analyze and address soft tissue restrictions, analyze and cue movement patterns, analyze and modify body mechanics/ergonomics, assess and modify postural abnormalities, and instruct in home and community integration to attain remaining goals. []  See Plan of Care  []  See progress note/recertification  []  See Discharge Summary         Progress towards goals / Updated goals:  Goals for this certification period to be accomplished in 4 weeks:               1. The patient will report no greater than 5/10 pain in order to improve ease of chores. Re-cert: 2/98              2. The patient will demonstrate negative felisa test to reduce lumbar extension tendency in standing to maximize ease of standing. Re-cert: Sharyn Kai stretch (+) (B). Current: remains positive 7/22/2022              3. The patient will improve hip ABD strength to 5/5 MMT to maximize stability in stance. Re-cert: MMT Hip abd Left 4+/5, Right 4+/5.              4. The patient will report being able to shop without having to sit down or lean over shopping cart to improve ease of chore production.               Re-cert: has not tried shopping yet    PLAN  [x]  Upgrade activities as tolerated     []  Continue plan of care  []  Update interventions per flow sheet       []  Discharge due to:_  []  Other:_      Naresh Araiza, PT 7/22/2022  9:29 AM    Future Appointments   Date Time Provider Delmer Montano   7/25/2022  9:45 AM [place], [place] [facility]   9/6/2022  9:15 AM Ирина Hurtado MD [place] [address]   10/4/2022 10:40 AM Rommel Oakes, [facility]   10/11/2022  9:55 AM [facility] LAB VISIT [facility] AMB   10/18/2022  9:20 AM Rommel Oakes, PT [facility]   10/18/2022 11:00 AM Laureano Elizabeth MD [facility] BS AMB

## 2022-07-25 ENCOUNTER — HOSPITAL ENCOUNTER (OUTPATIENT)
Dept: PHYSICAL THERAPY | Age: 87
Discharge: HOME | End: 2022-07-25
Payer: MEDICARE

## 2022-07-25 PROCEDURE — 97012 MECHANICAL TRACTION THERAPY: CPT

## 2022-07-25 PROCEDURE — 97112 NEUROMUSCULAR REEDUCATION: CPT

## 2022-07-25 PROCEDURE — 97110 THERAPEUTIC EXERCISES: CPT

## 2022-07-25 NOTE — PROGRESS NOTES
PT DAILY TREATMENT NOTE     Patient Name: Marlee Signs  Date:2022  : 1935  [x]  Patient  Verified  Payor: Michael Mckenna / Plan: [place] / Product Type: Medicare /    In time:9:45  Out time:10:40  Total Treatment Time (min): 55  Visit #: 4 of 8    Medicare/BCBS Only   Total Timed Codes (min):  45 1:1 Treatment Time:  45       Treatment Area: Hip pain, left [M25.552]    SUBJECTIVE  Pain Level (0-10 scale): 3/10  Any medication changes, allergies to medications, adverse drug reactions, diagnosis change, or new procedure performed?: [x] No    [] Yes (see summary sheet for update)  Subjective functional status/changes:   [x] No changes reported    OBJECTIVE    Modality rationale: decrease pain and increase tissue extensibility to improve the patients ability to perform ADL's.    Min Type Additional Details    [] Estim:  []Unatt       []IFC  []Premod                        []Other:  []w/ice   []w/heat  Position:  Location:    [] Estim: []Att    []TENS instruct  []NMES                    []Other:  []w/US   []w/ice   []w/heat  Position:  Location:   10 [x]  Traction: [] Cervical       [x]Lumbar                       [] Prone          [x]Supine                       []Intermittent   [x]Continuous Lbs: 55  [] before manual  [] after manual    []  Ultrasound: []Continuous   [] Pulsed                           []1MHz   []3MHz W/cm2:  Location:    []  Iontophoresis with dexamethasone         Location: [] Take home patch   [] In clinic    []  Ice     []  heat  []  Ice massage  []  Laser   []  Anodyne Position:  Location:    []  Laser with stim  []  Other:  Position:  Location:    []  Vasopneumatic Device    []  Right     []  Left  Pre-treatment girth:  Post-treatment girth:  Measured at (location):  Pressure:       [] lo [] med [] hi   Temperature: [] lo [] med [] hi   [] Skin assessment post-treatment:  []intact []redness- no adverse reaction    []redness - adverse reaction:     30 min Therapeutic Exercise:  [x] See flow sheet :   Rationale: increase ROM and increase strength to improve the patients ability to perform ADL's. 15 min Neuromuscular Re-education:  [x]  See flow sheet : Hook-lying marching and SLR flexion with power band, seated core stabs, emile pull downs, trapeze bar series. Rationale: increase strength, improve coordination, and increase proprioception  to improve the patients ability to perform functional activities. With   [x] TE   [] TA   [] neuro   [] other: Patient Education: [x] Review HEP    [] Progressed/Changed HEP based on:   [] positioning   [] body mechanics   [] transfers   [] heat/ice application    [] other:      Other Objective/Functional Measures: Increased ankle weights to 2.5# for seated mahendra disc series. Added standing trapeze bar series to increase core and (B) hip strength. Pain Level (0-10 scale) post treatment: 0/10    ASSESSMENT/Changes in Function: Fully participates in treatment. Pt reports having more \"good days\" than previously. Good relief with mechanical traction. Patient will continue to benefit from skilled PT services to modify and progress therapeutic interventions, address functional mobility deficits, address ROM deficits, address strength deficits, analyze and address soft tissue restrictions, analyze and cue movement patterns, and analyze and modify body mechanics/ergonomics to attain remaining goals. [x]  See Plan of Care  []  See progress note/recertification  []  See Discharge Summary         Progress towards goals / Updated goals:  Goals for this certification period to be accomplished in 4 weeks:               1. The patient will report no greater than 5/10 pain in order to improve ease of chores. Re-cert: 8/23              2. The patient will demonstrate negative felisa test to reduce lumbar extension tendency in standing to maximize ease of standing.                Re-cert: Sebas Barrios stretch (+) (B).              Current: remains positive 7/22/2022              3. The patient will improve hip ABD strength to 5/5 MMT to maximize stability in stance. Re-cert: MMT Hip abd Left 4+/5, Right 4+/5.              4. The patient will report being able to shop without having to sit down or lean over shopping cart to improve ease of chore production.               Re-cert: has not tried shopping yet    PLAN  []  Upgrade activities as tolerated     [x]  Continue plan of care  []  Update interventions per flow sheet       []  Discharge due to:_  []  Other:_      Trang Nick, [facility] 7/25/2022  9:52 AM    Future Appointments   Date Time Provider Delmer Wongisti   9/6/2022  9:15 AM Claudia Lynch MD [address] [place] [address]   10/4/2022 10:40 AM Cora Reilly, PT [address] [place] [address]   10/11/2022  9:55 AM [facility] LAB VISIT [facility] BS AMB   10/18/2022  9:20 AM Cora Reilly PT [facility]   10/18/2022 11:00 AM Jada Ricks MD [facility] BS AMB

## 2022-07-28 ENCOUNTER — HOSPITAL ENCOUNTER (OUTPATIENT)
Dept: PHYSICAL THERAPY | Age: 87
Discharge: HOME | End: 2022-07-28
Payer: MEDICARE

## 2022-07-28 PROCEDURE — 97012 MECHANICAL TRACTION THERAPY: CPT

## 2022-07-28 PROCEDURE — 97110 THERAPEUTIC EXERCISES: CPT

## 2022-07-28 PROCEDURE — 97112 NEUROMUSCULAR REEDUCATION: CPT

## 2022-07-28 NOTE — PROGRESS NOTES
PT DAILY TREATMENT NOTE     Patient Name: Mark Solomon  Date:2022  : 1935  [x]  Patient  Verified  Payor: Shanda Ferris / Plan: [place] MEDICARE PART A & B / Product Type: Medicare /    In time:9:08  Out time:10:00  Total Treatment Time (min): 52  Visit #: 5 of 8    Medicare/BCBS Only   Total Timed Codes (min):  42 1:1 Treatment Time:  42       Treatment Area: Hip pain, left [M25.552]    SUBJECTIVE  Pain Level (0-10 scale): 2-3/10  Any medication changes, allergies to medications, adverse drug reactions, diagnosis change, or new procedure performed?: [x] No    [] Yes (see summary sheet for update)  Subjective functional status/changes:   [] No changes reported  \"Having some pain this morning. \"    OBJECTIVE    Modality rationale: decrease pain and increase tissue extensibility to improve the patients ability to perform ADL's.    Min Type Additional Details    [] Estim:  []Unatt       []IFC  []Premod                        []Other:  []w/ice   []w/heat  Position:  Location:    [] Estim: []Att    []TENS instruct  []NMES                    []Other:  []w/US   []w/ice   []w/heat  Position:  Location:   10 [x]  Traction: [] Cervical       [x]Lumbar                       [] Prone          [x]Supine                       []Intermittent   [x]Continuous Lbs: 55#  [] before manual  [] after manual    []  Ultrasound: []Continuous   [] Pulsed                           []1MHz   []3MHz W/cm2:  Location:    []  Iontophoresis with dexamethasone         Location: [] Take home patch   [] In clinic    []  Ice     []  heat  []  Ice massage  []  Laser   []  Anodyne Position:  Location:    []  Laser with stim  []  Other:  Position:  Location:    []  Vasopneumatic Device    []  Right     []  Left  Pre-treatment girth:  Post-treatment girth:  Measured at (location):  Pressure:       [] lo [] med [] hi   Temperature: [] lo [] med [] hi   [] Skin assessment post-treatment:  []intact []redness- no adverse reaction    []redness - adverse reaction:     30 min Therapeutic Exercise:  [x] See flow sheet :   Rationale: increase ROM and increase strength to improve the patients ability to perform ADL's.     12 min Neuromuscular Re-education:  [x]  See flow sheet : Hook-lying marching and SLR flexion with power band, emile pull downs, trapeze bar series. Rationale: increase strength, improve coordination, and increase proprioception  to improve the patients ability to perform functional activities. With   [x] TE   [] TA   [] neuro   [] other: Patient Education: [x] Review HEP    [] Progressed/Changed HEP based on:   [] positioning   [] body mechanics   [] transfers   [] heat/ice application    [] other:      Other Objective/Functional Measures: Added piriformis stretch IR/ER and added side-lying hip abd, ER and FH. Pain Level (0-10 scale) post treatment: 0/10    ASSESSMENT/Changes in Function: Pt reports low back symptoms have been more manageable, pt is now having complaints of Left sided hip pain which radiates down posterior into mid-thigh. Added several exercises to increase Left hip strength and flexibility. Pt is schedule for an injection on Tuesday regarding Left hip. Pt reports pain at worst 5/10 in the last few days. Continue PT to further increase strength/flexibility to improve ease of performing functional activities. Patient will continue to benefit from skilled PT services to modify and progress therapeutic interventions, address functional mobility deficits, address ROM deficits, address strength deficits, analyze and address soft tissue restrictions, analyze and cue movement patterns, and analyze and modify body mechanics/ergonomics to attain remaining goals.      [x]  See Plan of Care  []  See progress note/recertification  []  See Discharge Summary         Progress towards goals / Updated goals:  Goals for this certification period to be accomplished in 4 weeks:               1. The patient will report no greater than 5/10 pain in order to improve ease of chores. Re-cert: 3/74   Current: Progressing, 5/10. 7/28/2022              2. The patient will demonstrate negative felisa test to reduce lumbar extension tendency in standing to maximize ease of standing. Re-cert: Lindalou Pipes stretch (+) (B). Current: remains positive 7/22/2022              3. The patient will improve hip ABD strength to 5/5 MMT to maximize stability in stance. Re-cert: MMT Hip abd Left 4+/5, Right 4+/5.              4. The patient will report being able to shop without having to sit down or lean over shopping cart to improve ease of chore production.               Re-cert: has not tried shopping yet    PLAN  []  Upgrade activities as tolerated     [x]  Continue plan of care  []  Update interventions per flow sheet       []  Discharge due to:_  []  Other:_      Ángela Mishra, PTA 7/28/2022  9:12 AM    Future Appointments   Date Time Provider Delmer Montano   7/28/2022  9:15 AM Remus Close, PTA [facility]   8/5/2022  9:15 AM Remus Close, PTA [facility]   8/8/2022 12:00 PM [facility]   8/12/2022 10:45 AM [facility]   9/6/2022  9:15 AM Leslye Gaviria MD [facility]   10/4/2022 10:40 AM Ida Segura, PT [facility]   10/11/2022  9:55 AM [facility] LAB VISIT [facility] BS AMB   10/18/2022  9:20 AM dIa Segura, PT [facility] SCHED   10/18/2022 11:00 AM Charmaine Rubinstein, MD [facility] BS AMB

## 2022-08-02 ENCOUNTER — APPOINTMENT (OUTPATIENT)
Dept: PHYSICAL THERAPY | Age: 87
End: 2022-08-02
Payer: MEDICARE

## 2022-08-05 ENCOUNTER — HOSPITAL ENCOUNTER (OUTPATIENT)
Dept: PHYSICAL THERAPY | Age: 87
Discharge: HOME | End: 2022-08-05
Payer: MEDICARE

## 2022-08-05 PROCEDURE — 97110 THERAPEUTIC EXERCISES: CPT

## 2022-08-05 PROCEDURE — 97112 NEUROMUSCULAR REEDUCATION: CPT

## 2022-08-05 PROCEDURE — 97012 MECHANICAL TRACTION THERAPY: CPT

## 2022-08-05 NOTE — PROGRESS NOTES
PT DAILY TREATMENT NOTE     Patient Name: Adriana Vega  OINY:[birth year]  : 1935  [x]  Patient  Verified  Payor: Clarkston Pallavi / Plan: [place] MEDICARE PART A & B / Product Type: Medicare /    In time:9:16  Out time:10:10  Total Treatment Time (min): 54  Visit #: 6 of 8    Medicare/BCBS Only   Total Timed Codes (min):  44 1:1 Treatment Time:  44       Treatment Area: Hip pain, left [M25.552]    SUBJECTIVE  Pain Level (0-10 scale): 4/10  Any medication changes, allergies to medications, adverse drug reactions, diagnosis change, or new procedure performed?: [x] No    [] Yes (see summary sheet for update)  Subjective functional status/changes:   [] No changes reported  \"I had the injection in my hip on Tuesday and it's been hurting and sore since. \"    OBJECTIVE    Modality rationale: decrease pain and increase tissue extensibility to improve the patients ability to perform ADL's.    Min Type Additional Details    [] Estim:  []Unatt       []IFC  []Premod                        []Other:  []w/ice   []w/heat  Position:  Location:    [] Estim: []Att    []TENS instruct  []NMES                    []Other:  []w/US   []w/ice   []w/heat  Position:  Location:   10 [x]  Traction: [] Cervical       [x]Lumbar                       [] Prone          [x]Supine                       []Intermittent   [x]Continuous Lbs: 55  [] before manual  [] after manual    []  Ultrasound: []Continuous   [] Pulsed                           []1MHz   []3MHz W/cm2:  Location:    []  Iontophoresis with dexamethasone         Location: [] Take home patch   [] In clinic    []  Ice     []  heat  []  Ice massage  []  Laser   []  Anodyne Position:  Location:    []  Laser with stim  []  Other:  Position:  Location:    []  Vasopneumatic Device    []  Right     []  Left  Pre-treatment girth:  Post-treatment girth:  Measured at (location):  Pressure:       [] lo [] med [] hi   Temperature: [] lo [] med [] hi   [] Skin assessment post-treatment:  []intact []redness- no adverse reaction    []redness - adverse reaction:     34 min Therapeutic Exercise:  [x] See flow sheet :   Rationale: increase ROM and increase strength to improve the patients ability to perform ADL's. 10 min Neuromuscular Re-education:  [x]  See flow sheet : Hook-lying marching and SLR flexion with power band, trapeze bar series. Rationale: increase strength, improve coordination, and increase proprioception  to improve the patients ability to perform functional activities. With   [x] TE   [] TA   [] neuro   [] other: Patient Education: [x] Review HEP    [] Progressed/Changed HEP based on:   [] positioning   [] body mechanics   [x] transfers   [] heat/ice application    [] other:      Other Objective/Functional Measures:      Pain Level (0-10 scale) post treatment: 3/10 Left SI/hip. ASSESSMENT/Changes in Function: Pt reports an exacerbation in Left hip pain after receiving an injection on Tuesday, 8/2/2022. Further reports having an increase in Left foot numbness/tingling frequency. Unable to perform Left side-lying PRE's secondary to Left hip pain. No significant change in Left hip pain post-treatment, denied low back pain and Left foot numbness/tingling post-treatment. Continue PT to increase core/(B) hip strength and decrease mm restrictions to improve ease of performing daily tasks and transfers. Patient will continue to benefit from skilled PT services to modify and progress therapeutic interventions, address functional mobility deficits, address ROM deficits, address strength deficits, analyze and address soft tissue restrictions, analyze and cue movement patterns, and analyze and modify body mechanics/ergonomics to attain remaining goals.      [x]  See Plan of Care  []  See progress note/recertification  []  See Discharge Summary         Progress towards goals / Updated goals:  Goals for this certification period to be accomplished in 4 weeks:               1. The patient will report no greater than 5/10 pain in order to improve ease of chores. Re-cert: 7/06              Current: Progressing, 5/10. 7/28/2022              2. The patient will demonstrate negative felisa test to reduce lumbar extension tendency in standing to maximize ease of standing. Re-cert: [place] Yen stretch (+) (B). Current: remains positive 7/22/2022              3. The patient will improve hip ABD strength to 5/5 MMT to maximize stability in stance. Re-cert: MMT Hip abd Left 4+/5, Right 4+/5.              4. The patient will report being able to shop without having to sit down or lean over shopping cart to improve ease of chore production. Re-cert: has not tried shopping yet   Current: Grocery shopping only, has not attempted other shopping at this time.  8/5/2022    PLAN  []  Upgrade activities as tolerated     [x]  Continue plan of care  []  Update interventions per flow sheet       []  Discharge due to:_  []  Other:_      Raul Romo PTA 8/5/2022  9:18 AM    Future Appointments   Date Time Provider Delmer Montano   8/8/2022 12:00 PM Risa Nolasco, PTA [facility]   8/12/2022 10:45 AM Kieran Bingham, PT [facility]   9/6/2022  9:15 AM Chepe Reyes MD [facility] SCHED   10/4/2022 10:40 AM Monica Solares PT [facility] SCH[facility]   10/11/2022  9:55 AM IO LAB VISIT IO BS AMB   10/18/2022  9:20 AM Monica Solares, PT [facility] SCHED   10/18/2022 11:00 AM Venancio Melendez MD [facility] BS AMB

## 2022-08-08 ENCOUNTER — HOSPITAL ENCOUNTER (OUTPATIENT)
Dept: GENERAL RADIOLOGY | Age: 87
Discharge: HOME | End: 2022-08-08
Payer: MEDICARE

## 2022-08-08 ENCOUNTER — HOSPITAL ENCOUNTER (OUTPATIENT)
Dept: PHYSICAL THERAPY | Age: 87
Discharge: HOME | End: 2022-08-08
Payer: MEDICARE

## 2022-08-08 ENCOUNTER — OFFICE VISIT (OUTPATIENT)
Dept: INTERNAL MEDICINE CLINIC | Age: 87
End: 2022-08-08
Payer: MEDICARE

## 2022-08-08 VITALS
RESPIRATION RATE: 18 BRPM | DIASTOLIC BLOOD PRESSURE: 52 MMHG | HEIGHT: 67 IN | TEMPERATURE: 98 F | BODY MASS INDEX: 20.18 KG/M2 | WEIGHT: 128.6 LBS | SYSTOLIC BLOOD PRESSURE: 127 MMHG | OXYGEN SATURATION: 98 % | HEART RATE: 64 BPM

## 2022-08-08 DIAGNOSIS — G89.29 CHRONIC HIP PAIN, UNSPECIFIED LATERALITY: ICD-10-CM

## 2022-08-08 DIAGNOSIS — M25.559 CHRONIC HIP PAIN, UNSPECIFIED LATERALITY: ICD-10-CM

## 2022-08-08 DIAGNOSIS — M25.559 CHRONIC HIP PAIN, UNSPECIFIED LATERALITY: Primary | ICD-10-CM

## 2022-08-08 DIAGNOSIS — G89.29 CHRONIC HIP PAIN, UNSPECIFIED LATERALITY: Primary | ICD-10-CM

## 2022-08-08 PROCEDURE — 97110 THERAPEUTIC EXERCISES: CPT

## 2022-08-08 PROCEDURE — G8510 SCR DEP NEG, NO PLAN REQD: HCPCS | Performed by: INTERNAL MEDICINE

## 2022-08-08 PROCEDURE — 1101F PT FALLS ASSESS-DOCD LE1/YR: CPT | Performed by: INTERNAL MEDICINE

## 2022-08-08 PROCEDURE — 97012 MECHANICAL TRACTION THERAPY: CPT

## 2022-08-08 PROCEDURE — G8420 CALC BMI NORM PARAMETERS: HCPCS | Performed by: INTERNAL MEDICINE

## 2022-08-08 PROCEDURE — 73502 X-RAY EXAM HIP UNI 2-3 VIEWS: CPT

## 2022-08-08 PROCEDURE — 97112 NEUROMUSCULAR REEDUCATION: CPT

## 2022-08-08 PROCEDURE — G8399 PT W/DXA RESULTS DOCUMENT: HCPCS | Performed by: INTERNAL MEDICINE

## 2022-08-08 PROCEDURE — G0463 HOSPITAL OUTPT CLINIC VISIT: HCPCS | Performed by: INTERNAL MEDICINE

## 2022-08-08 PROCEDURE — G8427 DOCREV CUR MEDS BY ELIG CLIN: HCPCS | Performed by: INTERNAL MEDICINE

## 2022-08-08 PROCEDURE — 99213 OFFICE O/P EST LOW 20 MIN: CPT | Performed by: INTERNAL MEDICINE

## 2022-08-08 PROCEDURE — 1123F ACP DISCUSS/DSCN MKR DOCD: CPT | Performed by: INTERNAL MEDICINE

## 2022-08-08 PROCEDURE — 1090F PRES/ABSN URINE INCON ASSESS: CPT | Performed by: INTERNAL MEDICINE

## 2022-08-08 PROCEDURE — G8536 NO DOC ELDER MAL SCRN: HCPCS | Performed by: INTERNAL MEDICINE

## 2022-08-08 NOTE — PROGRESS NOTES
EMA Escobedo is here for about 2 months of worsening pain in the left hip area she describes. The pain is just inferior to the buttocks, but also in the upper medial left thigh. She has had 2 epidural injections, and uses ice, heat, Tylenol, and ibuprofen, which work a little bit. She wonders if it is okay to take the ibuprofen, and we discussed taking it with food and always taking with omeprazole. She was prescribed gabapentin, but was concerned because the pharmacist told her it could cause tremors, and her mother had Parkinson's. We discussed gabapentin and its use is in neurologic and orthopedic pain. I reminded her that if it causes tremor, she can always stop taking it, but that if her it x-ray does not tell us anything, the gabapentin may be the way to go. Past Medical History:   Diagnosis Date    Abnormal weight gain     Arthritis     Atrial fibrillation (HCC)     Cardiac arrhythmia     Chronic low back pain without sciatica     Drowsiness     Feels cold     History of artificial joint     Knee joint replacement status, right     Macrocytosis without anemia     Mitral valve disorder     MVP (mitral valve prolapse)     Osteopenia     PAF [facility]oxysmal atrial fibrillation) ([facility])     single episode age 70 due to scopolamine patch    PONV (postoperative nausea and vomiting)     no scopolamine patch    Recurrent UTI     Urethral caruncle     Urge incontinence of urine     Urge urinary incontinence     Urinary incontinence     unspec. Past Surgical History:   Procedure Laterality Date    HX BLADDER SUSPENSION      HX CATARACT REMOVAL Bilateral     HX HYSTERECTOMY      fibroids    HX OOPHORECTOMY Bilateral     benign mass    [place] TOTAL KNEE ARTHROPLASTY Right         Current Outpatient Medications   Medication Sig Dispense Refill    cephALEXin (Keflex) 250 mg capsule Take 1 Capsule by mouth daily.  90 Capsule 3    coenzyme Q-10 (CO Q-10) 30 mg capsule Take 30 mg by mouth.      fluocinoNIDE (LIDEX) 0.05 % external solution Apply to cotton tipped applicator to apply to ear canals once as needed 1 Bottle 1    calcium-cholecalciferol, d3, 600-125 mg-unit tab Take 1 Tab by mouth daily.  glucosamine sulfate 750 mg tab 750 mg.      fish oil-omega-3 fatty acids 340-1,000 mg capsule 2,000 mg.      B.animalis,bifid,infantis,long 10-15 mg TbEC Take 1 Cap by mouth daily.  ascorbic acid (VITAMIN C) 1,000 mg tablet Take 1,000 mg by mouth daily.  OTHER 2 Tabs two (2) times a day. Macular Protect Complete vitamin      aspirin 81 mg tablet Take 81 mg by mouth daily.  ASCORBIC ACID/VITAMIN E (CRANBERRY CONCENTRATE PO) Take 1 Cap by mouth two (2) times a day.  permethrin (ACTICIN) 5 % topical cream       predniSONE (STERAPRED DS) 10 mg dose pack as directed.  ciprofloxacin HCl (CIPRO) 500 mg tablet Take 1 Tablet by mouth two (2) times a day. 28 Tablet 0    vibegron (Gemtesa) 75 mg tab Take 75 mg by mouth daily.  90 Each 3        Allergies   Allergen Reactions    Latex Anaphylaxis    Ampicillin Hives    Codeine Nausea and Vomiting    Doxycycline Hives     States can take with benadryl    Macrodantin [Nitrofurantoin Macrocrystalline] Hives    Other Medication Hives     Thallium dyes    Pcn [Penicillins] Hives     Other reaction(s): mild rash/itching    Sulfa (Sulfonamide Antibiotics) Hives     Other reaction(s): mild rash/itching    Tetracycline Hives    Formaldehyde Rash    Macrobid [Nitrofurantoin Monohyd/M-Cryst] Rash    Methenamine Hippurate Rash    Scopolamine Other (comments)     Other reaction(s): cardiac dysrhythmia  Patient reports A-Fib arrythmia    Thallium-201 Rash        Social History     Socioeconomic History    Marital status:      Spouse name: Not on file    Number of children: Not on file    Years of education: Not on file    Highest education level: Not on file   Occupational History    Not on file   Tobacco Use    Smoking status: Never    Smokeless tobacco: Never   Substance and Sexual Activity    Alcohol use: No    Drug use: No    Sexual activity: Never   Other Topics Concern    Not on file   Social History Narrative    Not on file     Social Determinants of Health     Financial Resource Strain: Not on file   Food Insecurity: Not on file   Transportation Needs: Not on file   Physical Activity: Not on file   Stress: Not on file   Social Connections: Not on file   Intimate Partner Violence: Not on file   Housing Stability: Not on file        Family History   Problem Relation Age of Onset    Cancer Mother         Gastric     Heart Disease Father     Heart Disease Sister     Lung Cancer Brother     Cancer Brother         gastric           Visit Vitals  BP (!) 127/52   Pulse 64   Temp 98 °F (36.7 °C) (Temporal)   Resp 18   Ht 5' 7\" (1.702 m)   Wt 128 lb 9.6 oz (58.3 kg)   SpO2 98%   BMI 20.14 kg/m²        Review of Systems   Constitutional:  Negative for fever. Musculoskeletal:  Positive for joint pain. Negative for back pain. Physical Exam  Constitutional:       Comments: In distress on moving on and off the exam table. Not ill-appearing. Eyes:      General: No scleral icterus. Conjunctiva/sclera: Conjunctivae normal.   Neck:      Comments: No cervical or supraclavicular lymphadenopathy. Cardiovascular:      Rate and Rhythm: Normal rate and regular rhythm. Musculoskeletal:      Cervical back: Neck supple. Comments: No vertebral tenderness. Straight leg raise is mildly positive on the left, there is pain with left hip flexion plus external rotation. Skin:     General: Skin is warm and dry. Neurological:      Mental Status: She is alert and oriented to person, place, and time. Psychiatric:         Mood and Affect: Mood normal.                Diagnoses and all orders for this visit:    1.  Chronic hip pain, unspecified laterality  Comments:  pain left hip area, discussed could be imer joint pain and/ or radicular.   Xray-- if nromal, agree with starting gabapentin  Orders:  -     XR HIP LT W OR WO PELV 2-3 VWS; Future                  Gabbi Silverman MD

## 2022-08-08 NOTE — PROGRESS NOTES
PT DAILY TREATMENT NOTE     Patient Name: Ruben Mims  UGRB:3/0/2001  : 1935  [x]  Patient  Verified  Payor: Rm Keep / Plan: [place] MEDICARE PART A & B / Product Type: Medicare /    In time:12:00  Out time:12:53  Total Treatment Time (min): 53  Visit #: 7 of 8    Medicare/BCBS Only   Total Timed Codes (min):  43 1:1 Treatment Time:  43       Treatment Area: Hip pain, left [M25.552]    SUBJECTIVE  Pain Level (0-10 scale): 4/10 before medication, 0/10 after. Any medication changes, allergies to medications, adverse drug reactions, diagnosis change, or new procedure performed?: [x] No    [] Yes (see summary sheet for update)  Subjective functional status/changes:   [] No changes reported  \"It's been sore all weekend. I see a neurosurgeon on September the . \"    OBJECTIVE    Modality rationale: decrease pain and increase tissue extensibility to improve the patients ability to perform ADL's.    Min Type Additional Details    [] Estim:  []Unatt       []IFC  []Premod                        []Other:  []w/ice   []w/heat  Position:  Location:    [] Estim: []Att    []TENS instruct  []NMES                    []Other:  []w/US   []w/ice   []w/heat  Position:  Location:   10 [x]  Traction: [] Cervical       [x]Lumbar                       [] Prone          [x]Supine                       []Intermittent   [x]Continuous Lbs: 55#  [] before manual  [] after manual    []  Ultrasound: []Continuous   [] Pulsed                           []1MHz   []3MHz W/cm2:  Location:    []  Iontophoresis with dexamethasone         Location: [] Take home patch   [] In clinic    []  Ice     []  heat  []  Ice massage  []  Laser   []  Anodyne Position:  Location:    []  Laser with stim  []  Other:  Position:  Location:    []  Vasopneumatic Device    []  Right     []  Left  Pre-treatment girth:  Post-treatment girth:  Measured at (location):  Pressure:       [] lo [] med [] hi   Temperature: [] lo [] med [] hi   [] Skin assessment post-treatment:  []intact []redness- no adverse reaction    []redness - adverse reaction:     33 min Therapeutic Exercise:  [x] See flow sheet :   Rationale: increase ROM and increase strength to improve the patients ability to perform ADL's. 10 min Neuromuscular Re-education:  [x]  See flow sheet : Hook-lying marching and SLR flexion with power band, trapeze bar series. Rationale: increase strength, improve coordination, and increase proprioception  to improve the patients ability to perform functional activities. With   [x] TE   [] TA   [] neuro   [] other: Patient Education: [x] Review HEP    [] Progressed/Changed HEP based on:   [] positioning   [] body mechanics   [] transfers   [] heat/ice application    [] other:      Other Objective/Functional Measures: Added emile marching 10 reps each. Pain Level (0-10 scale) post treatment: 0/10    ASSESSMENT/Changes in Function: Denied pain post-treatment. Pt has 1 more F/U scheduled. Possibly place on hold or D/C at that time. Pt has neurosurgeon appointment in September. Patient will continue to benefit from skilled PT services to modify and progress therapeutic interventions, address functional mobility deficits, address ROM deficits, address strength deficits, analyze and address soft tissue restrictions, analyze and cue movement patterns, and analyze and modify body mechanics/ergonomics to attain remaining goals. [x]  See Plan of Care  []  See progress note/recertification  []  See Discharge Summary         Progress towards goals / Updated goals:  Goals for this certification period to be accomplished in 4 weeks:               1. The patient will report no greater than 5/10 pain in order to improve ease of chores. Re-cert: 0/62              Current: Progressing, 5/10. 7/28/2022              2. The patient will demonstrate negative felisa test to reduce lumbar extension tendency in standing to maximize ease of standing. Re-cert: Avery Pink stretch (+) (B). Current: remains positive 7/22/2022              3. The patient will improve hip ABD strength to 5/5 MMT to maximize stability in stance. Re-cert: MMT Hip abd Left 4+/5, Right 4+/5.              4. The patient will report being able to shop without having to sit down or lean over shopping cart to improve ease of chore production. Re-cert: has not tried shopping yet              Current: Grocery shopping only, has not attempted other shopping at this time.  8/5/2022    PLAN  []  Upgrade activities as tolerated     [x]  Continue plan of care  []  Update interventions per flow sheet       []  Discharge due to:_  []  Other:_      Jalen Ceballos PTA 8/8/2022  11:57 AM    Future Appointments   Date Time Provider Delmer Montano   8/8/2022 12:00 PM Beatriz Estrada PTA [facility]   8/8/2022  1:20 PM Elaine Viramontes MD [facility] BS AMB   8/12/2022 10:45 AM Onelia Scott, PT [facility]   8/22/2022 10:30 AM [facility] BONE DENSITY RM 1 [facility]CRBDD [facility]   9/6/2022  9:15 AM Gary Cronin MD [facility] SCHED   10/4/2022 10:40 AM Horacio Griggs, PT [facility] SCHED   10/11/2022  9:55 AM [facility] VISIT [facility] BS AMB   10/18/2022  9:20 AM Horacio Griggs, PT [facility] SCHED   10/18/2022 11:00 AM Elaine Viramontes MD [facility] BS AMB

## 2022-08-08 NOTE — PROGRESS NOTES
Caludia Jauregui presents today for   Chief Complaint   Patient presents with    Hip Pain       Is someone accompanying this pt? no    Is the patient using any DME equipment during [address]? no    Depression Screening:  3 most recent PHQ Screens 8/8/2022   Little interest or pleasure in doing things Not at all   Feeling down, depressed, irritable, or hopeless Not at all   Total Score PHQ 2 0       Learning Assessment:  Learning Assessment 7/24/2018   PRIMARY LEARNER Patient   HIGHEST LEVEL OF EDUCATION - PRIMARY LEARNER  SOME COLLEGE   BARRIERS PRIMARY LEARNER NONE   CO-LEARNER CAREGIVER No   PRIMARY LANGUAGE ENGLISH   LEARNER PREFERENCE PRIMARY LISTENING   ANSWERED BY patient   RELATIONSHIP SELF       Abuse Screening:  Abuse Screening Questionnaire 4/12/2022   Do you ever feel afraid of your partner? N   Are you in a relationship with someone who physically or mentally threatens you? N   Is it safe for you to go home? Y       Fall Risk  Fall Risk Assessment, last 12 mths 8/8/2022   Able to walk? Yes   Fall in past 12 months? 0   Do you feel unsteady? 0   Are you worried about falling 0       ADL  ADL Assessment 9/18/2020   Feeding yourself No Help Needed   Getting from bed to chair No Help Needed   Getting dressed No Help Needed   Bathing or showering No Help Needed   Walk across the room (includes cane/walker) No Help Needed   Using the telphone No Help Needed   Taking your medications No Help Needed   Preparing meals No Help Needed   Managing money (expenses/bills) No Help Needed   Moderately strenuous housework (laundry) No Help Needed   Shopping for personal items (toiletries/medicines) No Help Needed   Shopping for groceries No Help Needed   Driving No Help Needed   Climbing a flight of stairs No Help Needed   Getting to places beyond walking distances No Help Needed       Health Maintenance reviewed and discussed and ordered per Provider.     Health Maintenance Due   Topic Date Due    Shingrix Vaccine Age 49> (1 of 2) Never done    Medicare Yearly Exam  09/19/2021    COVID-19 Vaccine (4 - Booster for Climmie Messier series) 03/02/2022   . Coordination of Care:  1. Have you been to the ER, urgent care clinic since your last visit? Hospitalized since your last visit? no    2. Have you seen or consulted any other health care providers outside of the [address] [place] [address] since your last visit? Include any pap smears or colon screening. no    3. For patients aged 39-70: Has the patient had a colonoscopy? NA - based on age     If the patient is female:    4. For patients aged 41-77: Has the patient had a mammogram within the past 2 years? NA - based on age    11. For patients aged 21-65: Has the patient had a pap smear?  NA - based on age           [de-identified]

## 2022-08-12 ENCOUNTER — APPOINTMENT (OUTPATIENT)
Dept: PHYSICAL THERAPY | Age: 87
End: 2022-08-12
Payer: MEDICARE

## 2022-08-12 ENCOUNTER — TELEPHONE (OUTPATIENT)
Dept: PHYSICAL THERAPY | Age: 87
End: 2022-08-12

## 2022-08-15 ENCOUNTER — TELEPHONE (OUTPATIENT)
Dept: INTERNAL MEDICINE CLINIC | Age: 87
End: 2022-08-15

## 2022-08-15 NOTE — TELEPHONE ENCOUNTER
Patient is aware of results and verbalizes understanding. Patient states she has tried gabapentin and it makes her feel very groggy, dizzy and nauseated. Patient wanted to know if there was something else she could take.

## 2022-08-15 NOTE — TELEPHONE ENCOUNTER
She needs to let Ortho know that she did not tolerate the gabapentin, asked them if there is something else that she can try.

## 2022-08-15 NOTE — TELEPHONE ENCOUNTER
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 I recommend starting the gabapentin, then making an appointment in a few weeks to follow-up with Ortho to see if the gabapentin is helping, and so they can decide if any further evaluation of her hips is needed.

## 2022-08-17 ENCOUNTER — HOSPITAL ENCOUNTER (OUTPATIENT)
Dept: PHYSICAL THERAPY | Age: 87
Discharge: HOME | End: 2022-08-17
Payer: MEDICARE

## 2022-08-17 PROCEDURE — 97110 THERAPEUTIC EXERCISES: CPT

## 2022-08-17 PROCEDURE — 97012 MECHANICAL TRACTION THERAPY: CPT

## 2022-08-17 PROCEDURE — 97112 NEUROMUSCULAR REEDUCATION: CPT

## 2022-08-17 NOTE — PROGRESS NOTES
[facility]  [address]  [place], [address]  [phone] fax    Continued Plan of Care/ Re-certification for Physical Therapy Services    Patient name: Joni Amaro Start of Care: 2022   Referral source: Jamal García : 1935               Medical Diagnosis: Hip pain, left [M25.552]  Payor: VA MEDICARE / Plan: VA MEDICARE PART A & B / Product Type: Medicare /  Onset Date: About 6 months ago               Treatment Diagnosis: LBP   Prior Hospitalization: see medical history Provider#: [id number]   Medications: Verified on Patient summary List    Comorbidities: Arthritis, hx LBP, lumbar stenosis   Prior Level of Function: The patient reports that she was able to ambulate without limitation prior to onset. Visits from Start of Care: 11    Missed Visits: 1    The Plan of Care and following information is based on the patient's current status:  Goals for this certification period to be accomplished in 4 weeks:               1. The patient will report no greater than 5/10 pain in order to improve ease of chores. Re-cert:               Current: Met - 4-5/10 at worst              2. The patient will demonstrate negative felisa test to reduce lumbar extension tendency in standing to maximize ease of standing. Re-cert: Elfida Factor stretch (+) (B). Current: remains positive 2022              3. The patient will improve hip ABD strength to 5/5 MMT to maximize stability in stance. Re-cert: Met               4. The patient will report being able to shop without having to sit down or lean over shopping cart to improve ease of chore production. Re-cert: has not tried shopping yet              Current: Grocery shopping only, has not attempted other shopping at this time. 2022    Key functional changes:  The patient has made good progress towards goals, but does continue to have pain with prolonged standing and walking. She has been discharged to continue HEP in home setting and leaves in no apparent distress void of pain with all questions answered in agreement with plan. Problems/ barriers to goal attainment: severity of symptoms and advanced age     Problem List: pain affecting function, decrease ROM, decrease strength, decrease ADL/ functional abilitiies, decrease activity tolerance, and decrease flexibility/ joint mobility    Treatment Plan: Therapeutic exercise, Therapeutic activities, Neuromuscular re-education, Physical agent/modality, Manual therapy, Patient education, Self Care training, and Functional mobility training    Patient Goal (s) has been updated and includes: less pain     Goals for this certification period to be accomplished in 1 weeks:               1. The patient will report no greater than 5/10 pain in order to improve ease of chores. Re-cert: 1/00              Current: Met - 4-5/10 at worst              2. The patient will demonstrate negative felisa test to reduce lumbar extension tendency in standing to maximize ease of standing. Re-cert: Narayan Valle stretch (+) (B). Current: remains positive 7/22/2022              3. The patient will improve hip ABD strength to 5/5 MMT to maximize stability in stance. Re-cert: Met 1/10/5663              4. The patient will report being able to shop without having to sit down or lean over shopping cart to improve ease of chore production. Re-cert: has not tried shopping yet              Current: Grocery shopping only, has not attempted other shopping at this time. 8/5/2022    Frequency / Duration: Patient to be seen 1 times per week for 1 weeks:    Assessment / Recommendations: The patient has made good progress towards goals, but does continue to have pain with prolonged standing and walking.  She has been discharged to continue HEP in home setting and leaves in no apparent distress void of pain with all questions answered in agreement with plan. Certification Period: 8/17/2022 - 8/18/2022    Geminironit Reilly, PT 8/17/2022 3:23 PM    ________________________________________________________________________  I certify that the above Therapy Services are being furnished while the patient is under my care. I agree with the treatment plan and certify that this therapy is necessary. [] I have read the above and request that my patient continue as recommended.   [] I have read the above report and request that my patient continue therapy with the following changes/special instructions: _____________________________________________  [] I have read the above report and request that my patient be discharged from therapy    Physician's Signature:____________Date:_________TIME:________     Radha Farah PA-C  ** Signature, Date and Time must be completed for valid certification **    Please sign and return to [facility] [address] [place] [address] & [facility]  [address]  [place], [facility] [address]  [phone] fax

## 2022-08-17 NOTE — PROGRESS NOTES
[facility]  [address]  [place], [address]  [phone] fax    Physical Therapy Discharge Summary  Patient name: Gennaro Olivas Start of Care: 2022   Referral source: Sully Ponce : 1935               Medical Diagnosis: Hip pain, left [M25.552]  Payor: VA MEDICARE / Plan: VA MEDICARE PART A & B / Product Type: Medicare /  Onset Date: About 6 months ago               Treatment Diagnosis: LBP   Prior Hospitalization: see medical history Provider#: [id number]   Medications: Verified on Patient summary List    Comorbidities: Arthritis, hx LBP, lumbar stenosis   Prior Level of Function: The patient reports that she was able to ambulate without limitation prior to onset. Visits from Start of Care: 11    Missed Visits: 0  Reporting Period : 2022 to 2022    Summary of Care:  Goals for this certification period to be accomplished in 1 weeks:               1. The patient will report no greater than 5/10 pain in order to improve ease of chores. Re-cert:               Current: Met - 4-5/10 at worst              2. The patient will demonstrate negative felisa test to reduce lumbar extension tendency in standing to maximize ease of standing. Re-cert: Jolie De Luna stretch (+) (B). Current: remains positive 2022              3. The patient will improve hip ABD strength to 5/5 MMT to maximize stability in stance. Re-cert: Met 5006              4. The patient will report being able to shop without having to sit down or lean over shopping cart to improve ease of chore production. Re-cert: has not tried shopping yet              Current: Grocery shopping only, has not attempted other shopping at this time. 2022    ASSESSMENT/RECOMMENDATIONS: The patient has made good progress towards goals, but does continue to have pain with prolonged standing and walking.  She has been discharged to continue HEP in home setting and leaves in no apparent distress void of pain with all questions answered in agreement with plan.    [x]Discontinue therapy: [x]Patient has reached or is progressing toward set goals      []Patient is non-compliant or has abdicated      []Due to lack of appreciable progress towards set [address], PT 8/17/2022 3:32 PM

## 2022-08-17 NOTE — PROGRESS NOTES
PT DAILY TREATMENT NOTE     Patient Name: Ruben Mims  SHFO:0[birth year]  : 1935  [x]  Patient  Verified  Payor: [employer] Keep / Plan: VA MEDICARE PART A & B / Product Type: Medicare /    In time:12:59  Out time:1:55  Total Treatment Time (min): 56  Visit #: 8 of 8    Medicare/BCBS Only   Total Timed Codes (min):  46 1:1 Treatment Time:  46       Treatment Area: Hip pain, left [M25.552]    SUBJECTIVE  Pain Level (0-10 scale): 2-3/10  Any medication changes, allergies to medications, adverse drug reactions, diagnosis change, or new procedure performed?: [x] No    [] Yes (see summary sheet for update)  Subjective functional status/changes:   [] No changes reported  The patient reports that she went to the grocery story today and has to use a cart to \"make it through\". She reports her symptoms have improved and she is due to see Neuro surgeon in September. She reports she was put on Gabapentin which made her very sick last week and that's why she was unable to attend therapy. OBJECTIVE  Modality rationale: decrease pain and increase tissue extensibility to improve the patients ability to improve ADL ease. Min Type Additional Details   10 [x]  Traction: [] Cervical       [x]Lumbar                       [] Prone          []Supine                       []Intermittent   []Continuous Lbs: 55#  [] before manual  [] after manual   [] Skin assessment post-treatment:  []intact []redness- no adverse reaction    []redness - adverse reaction:     34 min Therapeutic Exercise:  [x] See flow sheet :   Rationale: increase ROM and increase strength to improve the patients ability to improve ADL ease. 12 min Neuromuscular Re-education:  [x]  See flow sheet :   Rationale: increase ROM and increase strength  to improve the patients ability to improve ADL ease.      With   [] TE   [] TA   [] neuro   [] other: Patient Education: [x] Review HEP    [] Progressed/Changed HEP based on:   [] positioning   [] body mechanics   [] transfers   [] heat/ice application    [] other:      Other Objective/Functional Measures:   Pt reports that her right hip pain no greater than a 4-5/10 pain. Right Hip ABD strength: 5/5 MMT    Pain Level (0-10 scale) post treatment: 0/10    ASSESSMENT/Changes in Function: The patient has made good progress towards goals, but does continue to have pain with prolonged standing and walking. She has been discharged to continue HEP in home setting and leaves in no apparent distress void of pain with all questions answered in agreement with plan. Patient will continue to benefit from skilled PT services to modify and progress therapeutic interventions, address functional mobility deficits, address ROM deficits, address strength deficits, analyze and address soft tissue restrictions, analyze and cue movement patterns, analyze and modify body mechanics/ergonomics, assess and modify postural abnormalities, and instruct in home and community integration to attain remaining goals. []  See Plan of Care  []  See progress note/recertification  []  See Discharge Summary         Progress towards goals / Updated goals:  Goals for this certification period to be accomplished in 4 weeks:               1. The patient will report no greater than 5/10 pain in order to improve ease of chores. Re-cert: 9/46              Current: Met - 4-5/10 at worst              2. The patient will demonstrate negative felisa test to reduce lumbar extension tendency in standing to maximize ease of standing. Re-cert: Jolie De Luna stretch (+) (B). Current: remains positive 7/22/2022              3. The patient will improve hip ABD strength to 5/5 MMT to maximize stability in stance. Re-cert: Met 4/06/6438              4. The patient will report being able to shop without having to sit down or lean over shopping cart to improve ease of chore production.               Re-cert: has not tried shopping yet              Current: Grocery shopping only, has not attempted other shopping at this time.  8/5/2022    PLAN  [x]  Upgrade activities as tolerated     []  Continue plan of care  []  Update interventions per flow sheet       []  Discharge due to:_  []  Other:_      Russel Leal, PT 8/17/2022  1:08 PM    Future Appointments   Date Time Provider Delmer Montano   8/22/2022 10:30 AM [facility] BONE DENSITY RM 1 [facility]CRBDD [facility]   9/6/2022  9:15 AM Niurka Lobo MD [place] [address]   10/4/2022 10:40 AM Maame Chairez, [facility] [place]   10/11/2022  9:55 AM [facility] LAB VISIT [facility] BS AMB   10/18/2022  9:20 AM Maame Chairez PT Pending [facility] to [facility]   10/18/2022 11:00 AM Paz Valera MD [facility] BS AMB

## 2022-08-22 ENCOUNTER — HOSPITAL ENCOUNTER (OUTPATIENT)
Dept: BONE DENSITY | Age: 87
Discharge: HOME | End: 2022-08-22
Attending: INTERNAL MEDICINE
Payer: MEDICARE

## 2022-08-22 ENCOUNTER — TELEPHONE (OUTPATIENT)
Dept: INTERNAL MEDICINE CLINIC | Age: 87
End: 2022-08-22

## 2022-08-22 PROCEDURE — 77080 DXA BONE DENSITY AXIAL: CPT

## 2022-08-22 NOTE — TELEPHONE ENCOUNTER
Let her know that her bone density study shows that she has lost an additional 28% of bone density in her wrist.  Other areas look okay. I know she cannot take Fosamax anymore, we will discuss some other options at her visit in October.

## 2022-10-11 ENCOUNTER — APPOINTMENT (OUTPATIENT)
Dept: INTERNAL MEDICINE CLINIC | Age: 87
End: 2022-10-11

## 2022-10-11 ENCOUNTER — HOSPITAL ENCOUNTER (OUTPATIENT)
Dept: LAB | Age: 87
Discharge: HOME | End: 2022-10-11
Payer: MEDICARE

## 2022-10-11 DIAGNOSIS — Z78.0 OSTEOPENIA AFTER MENOPAUSE: ICD-10-CM

## 2022-10-11 DIAGNOSIS — E78.00 HYPERCHOLESTEROLEMIA: ICD-10-CM

## 2022-10-11 DIAGNOSIS — M85.80 OSTEOPENIA AFTER MENOPAUSE: ICD-10-CM

## 2022-10-11 LAB
ALBUMIN SERPL-MCNC: 3.5 G/DL (ref 3.4–5)
ALBUMIN/GLOB SERPL: 1.2 {RATIO} (ref 0.8–1.7)
ALP SERPL-CCNC: 60 U/L (ref 45–117)
ALT SERPL-CCNC: 24 U/L (ref 13–56)
ANION GAP SERPL CALC-SCNC: 6 MMOL/L (ref 3–18)
AST SERPL-CCNC: 27 U/L (ref 10–38)
BILIRUB SERPL-MCNC: 0.4 MG/DL (ref 0.2–1)
BUN SERPL-MCNC: 43 MG/DL (ref 7–18)
BUN/CREAT SERPL: 49 (ref 12–20)
CALCIUM SERPL-MCNC: 9 MG/DL (ref 8.5–10.1)
CHLORIDE SERPL-SCNC: 102 MMOL/L (ref 100–111)
CHOLEST SERPL-MCNC: 168 MG/DL
CO2 SERPL-SCNC: 33 MMOL/L (ref 21–32)
CREAT SERPL-MCNC: 0.88 MG/DL (ref 0.6–1.3)
GLOBULIN SER CALC-MCNC: 3 G/DL (ref 2–4)
GLUCOSE SERPL-MCNC: 80 MG/DL (ref 74–99)
HDLC SERPL-MCNC: 54 MG/DL (ref 40–60)
HDLC SERPL: 3.1 {RATIO} (ref 0–5)
LDLC SERPL CALC-MCNC: 93 MG/DL (ref 0–100)
LIPID PROFILE,FLP: NORMAL
POTASSIUM SERPL-SCNC: 4.2 MMOL/L (ref 3.5–5.5)
PROT SERPL-MCNC: 6.5 G/DL (ref 6.4–8.2)
SODIUM SERPL-SCNC: 141 MMOL/L (ref 136–145)
TRIGL SERPL-MCNC: 105 MG/DL (ref ?–150)
VLDLC SERPL CALC-MCNC: 21 MG/DL

## 2022-10-11 PROCEDURE — 36415 COLL VENOUS BLD VENIPUNCTURE: CPT

## 2022-10-11 PROCEDURE — 80061 LIPID PANEL: CPT

## 2022-10-11 PROCEDURE — 80053 COMPREHEN METABOLIC PANEL: CPT

## 2022-10-12 ENCOUNTER — TELEPHONE (OUTPATIENT)
Dept: INTERNAL MEDICINE CLINIC | Age: 87
End: 2022-10-12

## 2022-10-12 NOTE — TELEPHONE ENCOUNTER
Tell her her labs suggest that she is fairly dehydrated, make sure she drinks plenty of fluids.   Also, make sure she is not taking any diuretics (fluid pills) that are not listed on her medication list

## 2022-10-13 NOTE — TELEPHONE ENCOUNTER
Patient contacted, patient identified with two identifiers (Name & ). Patient aware of results per Dr. Yanes Space and verbalizes understanding.

## 2022-10-18 ENCOUNTER — OFFICE VISIT (OUTPATIENT)
Dept: INTERNAL MEDICINE CLINIC | Age: 87
End: 2022-10-18
Payer: MEDICARE

## 2022-10-18 VITALS
BODY MASS INDEX: 19.97 KG/M2 | SYSTOLIC BLOOD PRESSURE: 136 MMHG | OXYGEN SATURATION: 97 % | WEIGHT: 127.2 LBS | HEIGHT: 67 IN | TEMPERATURE: 97.5 F | HEART RATE: 65 BPM | RESPIRATION RATE: 18 BRPM | DIASTOLIC BLOOD PRESSURE: 65 MMHG

## 2022-10-18 DIAGNOSIS — Z23 NEEDS FLU SHOT: ICD-10-CM

## 2022-10-18 DIAGNOSIS — Z00.00 MEDICARE ANNUAL WELLNESS VISIT, SUBSEQUENT: Primary | ICD-10-CM

## 2022-10-18 PROCEDURE — 1123F ACP DISCUSS/DSCN MKR DOCD: CPT | Performed by: INTERNAL MEDICINE

## 2022-10-18 PROCEDURE — G8536 NO DOC ELDER MAL SCRN: HCPCS | Performed by: INTERNAL MEDICINE

## 2022-10-18 PROCEDURE — G0439 PPPS, SUBSEQ VISIT: HCPCS | Performed by: INTERNAL MEDICINE

## 2022-10-18 PROCEDURE — G8420 CALC BMI NORM PARAMETERS: HCPCS | Performed by: INTERNAL MEDICINE

## 2022-10-18 PROCEDURE — 99497 ADVNCD CARE PLAN 30 MIN: CPT | Performed by: INTERNAL MEDICINE

## 2022-10-18 PROCEDURE — 90694 VACC AIIV4 NO PRSRV 0.5ML IM: CPT | Performed by: INTERNAL MEDICINE

## 2022-10-18 PROCEDURE — G8510 SCR DEP NEG, NO PLAN REQD: HCPCS | Performed by: INTERNAL MEDICINE

## 2022-10-18 PROCEDURE — 1101F PT FALLS ASSESS-DOCD LE1/YR: CPT | Performed by: INTERNAL MEDICINE

## 2022-10-18 PROCEDURE — G8427 DOCREV CUR MEDS BY ELIG CLIN: HCPCS | Performed by: INTERNAL MEDICINE

## 2022-10-18 PROCEDURE — G8399 PT W/DXA RESULTS DOCUMENT: HCPCS | Performed by: INTERNAL MEDICINE

## 2022-10-18 NOTE — PROGRESS NOTES
Abdiel De La Garza presents today for   Chief Complaint   Patient presents with    Follow Up Chronic Condition       Is someone accompanying this pt? no    Is the patient using any DME equipment during [address]? no    Depression Screening:  3 most recent PHQ Screens 10/18/2022   Little interest or pleasure in doing things Not at all   Feeling down, depressed, irritable, or hopeless Not at all   Total Score PHQ 2 0       Learning Assessment:  Learning Assessment 7/24/2018   PRIMARY LEARNER Patient   HIGHEST LEVEL OF EDUCATION - PRIMARY LEARNER  SOME COLLEGE   BARRIERS PRIMARY LEARNER NONE   CO-LEARNER CAREGIVER No   PRIMARY LANGUAGE ENGLISH   LEARNER PREFERENCE PRIMARY LISTENING   ANSWERED BY patient   RELATIONSHIP SELF       Abuse Screening:  Abuse Screening Questionnaire 4/12/2022   Do you ever feel afraid of your partner? N   Are you in a relationship with someone who physically or mentally threatens you? N   Is it safe for you to go home? Y       Fall Risk  Fall Risk Assessment, last 12 mths 10/18/2022   Able to walk? Yes   Fall in past 12 months? 0   Do you feel unsteady? 0   Are you worried about falling 0       ADL  ADL Assessment 9/18/2020   Feeding yourself No Help Needed   Getting from bed to chair No Help Needed   Getting dressed No Help Needed   Bathing or showering No Help Needed   Walk across the room (includes cane/walker) No Help Needed   Using the telphone No Help Needed   Taking your medications No Help Needed   Preparing meals No Help Needed   Managing money (expenses/bills) No Help Needed   Moderately strenuous housework (laundry) No Help Needed   Shopping for personal items (toiletries/medicines) No Help Needed   Shopping for groceries No Help Needed   Driving No Help Needed   Climbing a flight of stairs No Help Needed   Getting to places beyond walking distances No Help Needed       Health Maintenance reviewed and discussed and ordered per Provider.     Health Maintenance Due   Topic Date Due    Shingrix Vaccine Age 50> (1 of 2) Never done    Medicare Yearly Exam  09/19/2021    COVID-19 Vaccine (4 - Booster for Moderna series) 03/02/2022    Flu Vaccine (1) 08/01/2022   . Coordination of Care:  1. Have you been to the ER, urgent care clinic since your last visit? Hospitalized since your last visit? no    2. Have you seen or consulted any other health care providers outside of the [address] [place] [address] since your last visit? Include any pap smears or colon screening. no    3. For patients aged 39-70: Has the patient had a colonoscopy? NA - based on age     If the patient is female:    4. For patients aged 41-77: Has the patient had a mammogram within the past 2 years? NA - based on age    11. For patients aged 21-65: Has the patient had a pap smear? NA - based on age    Abdiel De La Garza is a 82 y.o. female who presents for routine immunizations. She denies any symptoms , reactions or allergies that would exclude them from being immunized today. Risks and adverse reactions were discussed and the VIS was given to them. All questions were addressed. She was observed for 5 min post injection. There were no reactions observed. Verbal order for Flu IM received per Vlad Tran MD for pt Abdiel De La Garza. Order written down and read back to provider for verification prior to completion.

## 2022-10-18 NOTE — ACP (ADVANCE CARE PLANNING)
Advance Care Planning     Advance Care Planning (ACP) Physician/NP/PA Conversation      Date of Conversation: 10/18/2022  Conducted with: Patient with Decision Making Capacity    Healthcare Decision Maker:     Primary Decision Maker: Tarun Beverly - Child - [phone]    Secondary Decision Maker: Janice Collazo - Daughter - [phone]  Click here to complete [employer] including selection of the Healthcare Decision Maker Relationship (ie \"Primary\")      Today we documented Decision Maker(s) consistent with Legal Next of Kin hierarchy. Care Preferences:    Hospitalization: \"If your health worsens and it becomes clear that your chance of recovery is unlikely, what would be your preference regarding hospitalization? \"  The patient would prefer comfort-focused treatment without hospitalization. Ventilation: \"If you were unable to breathe on your own and your chance of recovery was unlikely, what would be your preference about the use of a ventilator (breathing machine) if it was available to you? \"   The patient would NOT desire the use of a ventilator. Resuscitation: \"In the event your heart stopped as a result of an underlying serious health condition, would you want attempts to be made to restart your heart, or would you prefer a natural death? \"   Yes, attempt to resuscitate.     Additional topics discussed: artificial nutrition    Conversation Outcomes / Follow-Up Plan:   ACP complete - no further action today  Reviewed DNR/DNI and patient elects Full Code (Attempt Resuscitation)     Length of Voluntary ACP Conversation in minutes:  16 minutes    Anna Bermeo MD

## 2022-10-18 NOTE — PATIENT INSTRUCTIONS
Medicare Wellness Visit, Female     The best way to live healthy is to have a lifestyle where you eat a well-balanced diet, exercise regularly, limit alcohol use, and quit all forms of tobacco/nicotine, if applicable. Regular preventive services are another way to keep healthy. Preventive services (vaccines, screening tests, monitoring & exams) can help personalize your care plan, which helps you manage your own care. Screening tests can find health problems at the earliest stages, when they are easiest to treat. [facility] follows the current, evidence-based guidelines published by the [facility] ([facility]) when recommending preventive services for our patients. Because we follow these guidelines, sometimes recommendations change over time as research supports it. (For example, mammograms used to be recommended annually. Even though Medicare will still pay for an annual mammogram, the newer guidelines recommend a mammogram every two years for women of average risk). Of course, you and your doctor may decide to screen more often for some diseases, based on your risk and your co-morbidities (chronic disease you are already diagnosed with). Preventive services for you include:  - Medicare offers their members a free annual wellness visit, which is time for you and your primary care provider to discuss and plan for your preventive service needs. Take advantage of this benefit every year!  -All adults over the age of 72 should receive the recommended pneumonia vaccines. Current USPSTF guidelines recommend a series of two vaccines for the best pneumonia protection.   -All adults should have a flu vaccine yearly and a tetanus vaccine every 10 years.   -All adults age 48 and older should receive the shingles vaccines (series of two vaccines).       -All adults age 38-68 who are overweight should have a diabetes screening test once every three years.   -All adults born between [birth year] and 1963 should be screened once for Hepatitis C.  -Other screening tests and preventive services for persons with diabetes include: an eye exam to screen for diabetic retinopathy, a kidney function test, a foot exam, and stricter control over your cholesterol.   -Cardiovascular screening for adults with routine risk involves an electrocardiogram (ECG) at intervals determined by your doctor.   -Colorectal cancer screenings should be done for adults age 54-65 with no increased risk factors for colorectal cancer. There are a number of acceptable methods of screening for this type of cancer. Each test has its own benefits and drawbacks. Discuss with your doctor what is most appropriate for you during your annual wellness visit. The different tests include: colonoscopy (considered the best screening method), a fecal occult blood test, a fecal DNA test, and sigmoidoscopy.    -A bone mass density test is recommended when a woman turns 65 to screen for osteoporosis. This test is only recommended one time, as a screening. Some providers will use this same test as a disease monitoring tool if you already have osteoporosis. -Breast cancer screenings are recommended every other year for women of normal risk, age 54-69.  -Cervical cancer screenings for women over age 72 are only recommended with certain risk factors. Here is a list of your current Health Maintenance items (your personalized list of preventive services) with a due date:  Health Maintenance Due   Topic Date Due    Shingles Vaccine (1 of 2) Never done    COVID-19 Vaccine (4 - Booster for Moderna series) 03/02/2022    Yearly Flu Vaccine (1) 08/01/2022         Vaccine Information Statement    Influenza (Flu) Vaccine (Inactivated or Recombinant): What You Need to Know    Many vaccine information statements are available in [place] and other languages. See www.immunize.org/vis.   Hojas de información sobre vacunas están disponibles en español y en muchos otros idiomas. Visite www.immunize.org/vis. 1. Why get vaccinated? Influenza vaccine can prevent influenza (flu). Flu is a contagious disease that spreads around the [place] every year, usually between October and May. Anyone can get the flu, but it is more dangerous for some people. Infants and young children, people 72 years and older, pregnant people, and people with certain health conditions or a weakened immune system are at greatest risk of flu complications. Pneumonia, bronchitis, sinus infections, and ear infections are examples of flu-related complications. If you have a medical condition, such as heart disease, cancer, or diabetes, flu can make it worse. Flu can cause fever and chills, sore throat, muscle aches, fatigue, cough, headache, and runny or stuffy nose. Some people may have vomiting and diarrhea, though this is more common in children than adults. In an average year, thousands of people in the [facility] die from flu, and many more are hospitalized. Flu vaccine prevents millions of illnesses and flu-related visits to the doctor each year. 2. Influenza vaccines     CDC recommends everyone 6 months and older get vaccinated every flu season. Children 6 months through 6years of age may need 2 doses during a single flu season. Everyone else needs only 1 dose each flu season. It takes about 2 weeks for protection to develop after vaccination. There are many flu viruses, and they are always changing. Each year a new flu vaccine is made to protect against the influenza viruses believed to be likely to cause disease in the upcoming flu season. Even when the vaccine doesnt exactly match these viruses, it may still provide some protection. Influenza vaccine does not cause flu. Influenza vaccine may be given at the same time as other vaccines.     3. Talk with your health care provider    Tell your vaccination provider if the person getting the vaccine:  Has had an allergic reaction after a previous dose of influenza vaccine, or has any severe, life-threatening allergies   Has ever had Guillain-Barré Syndrome (also called GBS)    In some cases, your health care provider may decide to postpone influenza vaccination until a future visit. Influenza vaccine can be administered at any time during pregnancy. People who are or will be pregnant during influenza season should receive inactivated influenza vaccine. People with minor illnesses, such as a cold, may be vaccinated. People who are moderately or severely ill should usually wait until they recover before getting influenza vaccine. Your health care provider can give you more information. 4. Risks of a vaccine reaction    Soreness, redness, and swelling where the shot is given, fever, muscle aches, and headache can happen after influenza vaccination. There may be a very small increased risk of Guillain-Barré Syndrome (GBS) after inactivated influenza vaccine (the flu shot). Ericka Banerjee children who get the flu shot along with pneumococcal vaccine (PCV13) and/or DTaP vaccine at the same time might be slightly more likely to have a seizure caused by fever. Tell your health care provider if a child who is getting flu vaccine has ever had a seizure. People sometimes faint after medical procedures, including vaccination. Tell your provider if you feel dizzy or have vision changes or ringing in the ears. As with any medicine, there is a very remote chance of a vaccine causing a severe allergic reaction, other serious injury, or death. 5. What if there is a serious problem? An allergic reaction could occur after the vaccinated person leaves the clinic.  If you see signs of a severe allergic reaction (hives, swelling of the face and throat, difficulty breathing, a fast heartbeat, dizziness, or weakness), call 9-1-1 and get the person to the nearest hospital.    For other signs that concern you, call your health care provider. Adverse reactions should be reported to the Vaccine Adverse Event Reporting System (VAERS). Your health care provider will usually file this report, or you can do it yourself. Visit the VAERS website at www.vaers. [facility].gov or call 4-576.747.6224. VAERS is only for reporting reactions, and VAERS staff members do not give medical advice. 6. The National Vaccine Injury Compensation Program    The [facility] Vaccine Injury Compensation Program (VICP) is a federal program that was created to compensate people who may have been injured by certain vaccines. Claims regarding alleged injury or death due to vaccination have a time limit for filing, which may be as short as two years. Visit the VICP website at www.[facility].gov/vaccinecompensation or call 6-522.778.7624 to learn about the program and about filing a claim. 7. How can I learn more? Ask your health care provider. Call your local or state health department. Visit the website of the Food and Drug Administration (FDA) for vaccine package inserts and additional information at www.fda.gov/vaccines-blood-biologics/vaccines. Contact the Centers for Disease Control and Prevention (CDC): Call 1-811.876.2553 (9-050-RLC-INFO) or  Visit CDCs influenza website at www.cdc.gov/flu. Vaccine Information Statement   Inactivated Influenza Vaccine   8/6/2021  [address] [id number]   Department of Health and Human Services  Centers for Disease Control and Prevention    Office Use Only

## 2022-10-18 NOTE — PROGRESS NOTES
EMA     Em Wheeler is here for an annual Medicare wellness/advanced care planning visit. No new issues reported. She would like a flu vaccine today        Past Medical History:   Diagnosis Date    Abnormal weight gain     Arthritis     Atrial fibrillation (HCC)     Cardiac arrhythmia     Chronic low back pain without sciatica     Drowsiness     Feels cold     History of artificial joint     Knee joint replacement status, right     Macrocytosis without anemia     Mitral valve disorder     MVP (mitral valve prolapse)     Osteopenia     PAF (paroxysmal atrial fibrillation) (HCC)     single episode age 70 due to scopolamine patch    PONV (postoperative nausea and vomiting)     no scopolamine patch    Recurrent UTI     Urethral caruncle     Urge incontinence of urine     Urge urinary incontinence     Urinary incontinence     unspec. Past Surgical History:   Procedure Laterality Date    HX BLADDER SUSPENSION      HX CATARACT REMOVAL Bilateral     HX HYSTERECTOMY      fibroids    HX OOPHORECTOMY Bilateral     benign mass    OK TOTAL KNEE ARTHROPLASTY Right         Current Outpatient Medications   Medication Sig Dispense Refill    cephALEXin (Keflex) 250 mg capsule Take 1 Capsule by mouth daily. 90 Capsule 3    coenzyme Q-10 (CO Q-10) 30 mg capsule Take 30 mg by mouth.  fluocinoNIDE (LIDEX) 0.05 % external solution Apply to cotton tipped applicator to apply to ear canals once as needed 1 Bottle 1    calcium-cholecalciferol, d3, 600-125 mg-unit tab Take 1 Tab by mouth daily.  glucosamine sulfate 750 mg tab 750 mg.      fish oil-omega-3 fatty acids 340-1,000 mg capsule 2,000 mg.      B.animalis,bifid,infantis,long 10-15 mg TbEC Take 1 Cap by mouth daily.  ascorbic acid (VITAMIN C) 1,000 mg tablet Take 1,000 mg by mouth daily.  OTHER 2 Tabs two (2) times a day. Macular Protect Complete vitamin      aspirin 81 mg tablet Take 81 mg by mouth daily.       ASCORBIC ACID/VITAMIN E (CRANBERRY CONCENTRATE PO) Take 1 Cap by mouth two (2) times a day.           Allergies   Allergen Reactions    Latex Anaphylaxis    Ampicillin Hives    Codeine Nausea and Vomiting    Doxycycline Hives     States can take with benadryl    Macrodantin [Nitrofurantoin Macrocrystalline] Hives    Other Medication Hives     Thallium dyes    Pcn [Penicillins] Hives     Other reaction(s): mild rash/itching    Sulfa (Sulfonamide Antibiotics) Hives     Other reaction(s): mild rash/itching    Tetracycline Hives    Formaldehyde Rash    Macrobid [Nitrofurantoin Monohyd/M-Cryst] Rash    Methenamine Hippurate Rash    Scopolamine Other (comments)     Other reaction(s): cardiac dysrhythmia  Patient reports A-Fib arrythmia    Thallium-201 Rash        Social History     Socioeconomic History    Marital status:      Spouse name: Not on file    Number of children: Not on file    Years of education: Not on file    Highest education level: Not on file   Occupational History    Not on file   Tobacco Use    Smoking status: Never    Smokeless tobacco: Never   Substance and Sexual Activity    Alcohol use: No    Drug use: No    Sexual activity: Never   Other Topics Concern    Not on file   Social History Narrative    Not on file     Social Determinants of Health     Financial Resource Strain: Not on file   Food Insecurity: Not on file   Transportation Needs: Not on file   Physical Activity: Not on file   Stress: Not on file   Social Connections: Not on file   Intimate Partner Violence: Not on file   Housing Stability: Not on file        Family History   Problem Relation Age of Onset    Cancer Mother         Gastric     Heart Disease Father     Heart Disease Sister     Lung Cancer Brother     Cancer Brother         gastric           Visit Vitals  /65   Pulse 65   Temp 97.5 °F (36.4 °C) (Temporal)   Resp 18   Ht 5' 7\" (1.702 m)   Wt 127 lb 3.2 oz (57.7 kg)   SpO2 97%   BMI 19.92 kg/m² Review of Systems   Constitutional:  Negative for chills and fever. Eyes:  Negative for blurred vision. Respiratory:  Negative for shortness of breath. Cardiovascular:  Negative for chest pain, orthopnea and PND. Gastrointestinal:  Negative for blood in stool. Genitourinary:  Negative for hematuria. Neurological:  Negative for headaches. Psychiatric/Behavioral:  Negative for depression. The patient is not nervous/anxious. Physical Exam  Constitutional:       General: She is not in acute distress. HENT:      Right Ear: Tympanic membrane, ear canal and external ear normal.      Left Ear: Tympanic membrane, ear canal and external ear normal.   Eyes:      General: No scleral icterus. Conjunctiva/sclera: Conjunctivae normal.   Cardiovascular:      Rate and Rhythm: Normal rate and regular rhythm. Heart sounds: No friction rub. No gallop. Pulmonary:      Effort: Pulmonary effort is normal.      Breath sounds: Normal breath sounds. Abdominal:      General: Abdomen is flat. Palpations: Abdomen is soft. Tenderness: There is no abdominal tenderness. Musculoskeletal:      Cervical back: Neck supple. Comments: No clubbing, cyanosis, or edema. Calves nontender, no cords. Skin:     General: Skin is warm and dry. Neurological:      Mental Status: She is alert and oriented to person, place, and time. Psychiatric:         Mood and Affect: Mood normal.                Diagnoses and all orders for this visit:    1. Medicare annual wellness visit, subsequent    2. Needs flu shot  -     INFLUENZA, FLUAD, (AGE 72 Y+), IM, PF, 0.5 ML         Follow-up and Dispositions    Return in about 6 months (around 4/18/2023) for routine.               Alicia Flynn MD   This is the Subsequent Medicare Annual Wellness Exam, performed 12 months or more after the Initial AWV or the last Subsequent AWV    I have reviewed the patient's medical history in detail and updated the computerized patient record. Assessment/Plan   Education and counseling provided:  Are appropriate based on today's review and evaluation    1. Medicare annual wellness visit, subsequent     Depression Risk Factor Screening     3 most recent PHQ Screens 10/18/2022   Little interest or pleasure in doing things Not at all   Feeling down, depressed, irritable, or hopeless Not at all   Total Score PHQ 2 0       Alcohol & Drug Abuse Risk Screen    Do you average more than 1 drink per night or more than 7 drinks a week:  No    On any one occasion in the past three months have you have had more than 3 drinks containing alcohol:  No          Functional Ability and Level of Safety    Hearing: Hearing is good. Activities of Daily Living: The home contains: grab bars  Patient does total self care      Ambulation: with no difficulty     Fall Risk:  Fall Risk Assessment, last 12 mths 10/18/2022   Able to walk? Yes   Fall in past 12 months? 0   Do you feel unsteady?  0   Are you worried about falling 0      Abuse Screen:  Patient is not abused       Cognitive Screening    Has your family/caregiver stated any concerns about your memory: no     Cognitive Screening: Normal - Mini Cog Test    Health Maintenance Due     Health Maintenance Due   Topic Date Due    Shingrix Vaccine Age 49> (1 of 2) Never done    COVID-19 Vaccine (4 - Booster for [place] series) 03/02/2022    Flu Vaccine (1) 08/01/2022       Patient Care Team   Patient Care Team:  Gilles Oviedo MD as PCP - General (Internal Medicine Physician)  Gilles Oviedo MD as PCP - [facility] Empaneled Provider  James Palacio MD (Obstetrics & Gynecology)    History     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    Abnormal weight gain R63.5    Feels cold R44.8    Mitral valve disorder I05.9    Drowsiness R40.0    Chronic low back pain without sciatica M54.50, G89.29    History of artificial joint Z96.60    Recurrent UTI N39.0    Mixed stress and urge urinary incontinence N39.46    Paroxysmal SVT (supraventricular tachycardia) ([facility]) I47.1    Seborrheic dermatitis L21.9    History of non anemic vitamin B12 deficiency Z86.39     Past Medical History:   Diagnosis Date    Abnormal weight gain     Arthritis     Atrial fibrillation ([facility])     Cardiac arrhythmia     Chronic low back pain without sciatica     Drowsiness     Feels cold     History of artificial joint     Knee joint replacement status, right     Macrocytosis without anemia     Mitral valve disorder     MVP (mitral valve prolapse)     Osteopenia     PAF (paroxysmal atrial fibrillation) ([facility])     single episode age 70 due to scopolamine patch    PONV (postoperative nausea and vomiting)     no scopolamine patch    Recurrent UTI     Urethral caruncle     Urge incontinence of urine     Urge urinary incontinence     Urinary incontinence     unspec. Past Surgical History:   Procedure Laterality Date    HX BLADDER SUSPENSION      HX CATARACT REMOVAL Bilateral     HX HYSTERECTOMY      fibroids    HX OOPHORECTOMY Bilateral     benign mass    HI TOTAL KNEE ARTHROPLASTY Right      Current Outpatient Medications   Medication Sig Dispense Refill    cephALEXin (Keflex) 250 mg capsule Take 1 Capsule by mouth daily. 90 Capsule 3    coenzyme Q-10 (CO Q-10) 30 mg capsule Take 30 mg by mouth.  fluocinoNIDE (LIDEX) 0.05 % external solution Apply to cotton tipped applicator to apply to ear canals once as needed 1 Bottle 1    calcium-cholecalciferol, d3, 600-125 mg-unit tab Take 1 Tab by mouth daily.  glucosamine sulfate 750 mg tab 750 mg.      fish oil-omega-3 fatty acids 340-1,000 mg capsule 2,000 mg.      B.animalis,bifid,infantis,long 10-15 mg TbEC Take 1 Cap by mouth daily.  ascorbic acid (VITAMIN C) 1,000 mg tablet Take 1,000 mg by mouth daily.  OTHER 2 Tabs two (2) times a day.  Macular Protect Complete vitamin      aspirin 81 mg tablet Take 81 mg by mouth daily.      ASCORBIC ACID/VITAMIN E (CRANBERRY CONCENTRATE PO) Take 1 Cap by mouth two (2) times a day.        Allergies   Allergen Reactions    Latex Anaphylaxis    Ampicillin Hives    Codeine Nausea and Vomiting    Doxycycline Hives     States can take with benadryl    Macrodantin [Nitrofurantoin Macrocrystalline] Hives    Other Medication Hives     Thallium dyes    Pcn [Penicillins] Hives     Other reaction(s): mild rash/itching    Sulfa (Sulfonamide Antibiotics) Hives     Other reaction(s): mild rash/itching    Tetracycline Hives    Formaldehyde Rash    Macrobid [Nitrofurantoin Monohyd/M-Cryst] Rash    Methenamine Hippurate Rash    Scopolamine Other (comments)     Other reaction(s): cardiac dysrhythmia  Patient reports A-Fib arrythmia    Thallium-201 Rash       Family History   Problem Relation Age of Onset    Cancer Mother         Gastric     Heart Disease Father     Heart Disease Sister     Lung Cancer Brother     Cancer Brother         gastric     Social History     Tobacco Use    Smoking status: Never    Smokeless tobacco: Never   Substance Use Topics    Alcohol use: No         Riky Colon MD

## 2023-01-30 DIAGNOSIS — M54.16 LUMBAR RADICULOPATHY: Primary | ICD-10-CM

## 2023-02-03 DIAGNOSIS — M54.16 LUMBAR RADICULOPATHY: Primary | ICD-10-CM

## 2023-02-21 ENCOUNTER — HOSPITAL ENCOUNTER (OUTPATIENT)
Facility: HOSPITAL | Age: 88
Discharge: HOME | End: 2023-02-24
Payer: MEDICARE

## 2023-02-21 ENCOUNTER — OFFICE VISIT (OUTPATIENT)
Age: 88
End: 2023-02-21
Payer: MEDICARE

## 2023-02-21 VITALS
HEIGHT: 67 IN | BODY MASS INDEX: 19.93 KG/M2 | SYSTOLIC BLOOD PRESSURE: 128 MMHG | HEART RATE: 70 BPM | TEMPERATURE: 97.7 F | RESPIRATION RATE: 18 BRPM | WEIGHT: 127 LBS | DIASTOLIC BLOOD PRESSURE: 61 MMHG | OXYGEN SATURATION: 97 %

## 2023-02-21 DIAGNOSIS — S01.01XD SCALP LACERATION, SUBSEQUENT ENCOUNTER: ICD-10-CM

## 2023-02-21 DIAGNOSIS — M53.3 COCCYDYNIA: Primary | ICD-10-CM

## 2023-02-21 DIAGNOSIS — M53.3 COCCYDYNIA: ICD-10-CM

## 2023-02-21 PROCEDURE — G8420 CALC BMI NORM PARAMETERS: HCPCS | Performed by: INTERNAL MEDICINE

## 2023-02-21 PROCEDURE — 1123F ACP DISCUSS/DSCN MKR DOCD: CPT | Performed by: INTERNAL MEDICINE

## 2023-02-21 PROCEDURE — 1036F TOBACCO NON-USER: CPT | Performed by: INTERNAL MEDICINE

## 2023-02-21 PROCEDURE — 72220 X-RAY EXAM SACRUM TAILBONE: CPT

## 2023-02-21 PROCEDURE — G8484 FLU IMMUNIZE NO ADMIN: HCPCS | Performed by: INTERNAL MEDICINE

## 2023-02-21 PROCEDURE — 99213 OFFICE O/P EST LOW 20 MIN: CPT

## 2023-02-21 PROCEDURE — 99213 OFFICE O/P EST LOW 20 MIN: CPT | Performed by: INTERNAL MEDICINE

## 2023-02-21 PROCEDURE — 1090F PRES/ABSN URINE INCON ASSESS: CPT | Performed by: INTERNAL MEDICINE

## 2023-02-21 PROCEDURE — G8428 CUR MEDS NOT DOCUMENT: HCPCS | Performed by: INTERNAL MEDICINE

## 2023-02-21 PROCEDURE — G8399 PT W/DXA RESULTS DOCUMENT: HCPCS | Performed by: INTERNAL MEDICINE

## 2023-02-21 ASSESSMENT — PATIENT HEALTH QUESTIONNAIRE - PHQ9
SUM OF ALL RESPONSES TO PHQ QUESTIONS 1-9: 0
1. LITTLE INTEREST OR PLEASURE IN DOING THINGS: 0
SUM OF ALL RESPONSES TO PHQ9 QUESTIONS 1 & 2: 0
SUM OF ALL RESPONSES TO PHQ QUESTIONS 1-9: 0
2. FEELING DOWN, DEPRESSED OR HOPELESS: 0

## 2023-02-21 NOTE — PROGRESS NOTES
Jennifer Fletcher presents today for No chief complaint on file. 1. \"Have you been to the ER, urgent care clinic since your last visit? Hospitalized since your last visit? \" no    2. \"Have you seen or consulted any other health care providers outside of the [address] [place] [address] since your last visit? \" no     3. For patients aged 39-70: Has the patient had a colonoscopy / FIT/ Cologuard? NA - based on age      If the patient is female:    4. For patients aged 41-77: Has the patient had a mammogram within the past 2 years? NA - based on age or sex      11. For patients aged 21-65: Has the patient had a pap smear?  NA - based on age or sex

## 2023-02-22 ENCOUNTER — TELEPHONE (OUTPATIENT)
Age: 88
End: 2023-02-22

## 2023-02-22 NOTE — PROGRESS NOTES
HEATHER     Chi Crowder is here with her daughter after sustaining a fall in which she landed sitting down. She had had what sounds like an epidural steroid injection, had not eaten before that, felt a little jittery after the injection, and did not get home until late. She was trying to take off her sweatshirt in the bathroom, stumbled, and landed sitting down. Since then, she has had severe coccydynia despite 1000 mg of Tylenol daily. They have a donut pillow, but not yet inflated it. Her daughter says she has a stuffed donut pillow that she can use in the meantime. She can sit on ice with some relief. She wants to know what else she can do for her pain. She does not want \"anything strong\". On questioning, she does not drink alcohol, and I advised her that she can safely take 1000 mg of Tylenol 3 times daily, possibly even 4000 mg if needed. She has a tube of Voltaren gel at home, but her daughter read that you are not supposed to put it on your torso. We discussed that she may put 4 g on 4 times daily to the area across her coccyx and sacrum. She hit her head, and was seen in the emergency room 4 days ago, where 3 staples were placed on the posterior right scalp laceration. A CT scan of her brain was done. Past Medical History:   Diagnosis Date    Abnormal weight gain     Arthritis     Atrial fibrillation ([facility])     Cardiac arrhythmia     Chronic low back pain without sciatica     Drowsiness     Feels cold     History of artificial joint     Knee joint replacement status, right     Macrocytosis without anemia     Mitral valve disorder     MVP (mitral valve prolapse)     Osteopenia     PAF (paroxysmal atrial fibrillation) ([facility])     single episode age 70 due to scopolamine patch    PONV (postoperative nausea and vomiting)     no scopolamine patch    Recurrent UTI     Urethral caruncle     Urge incontinence of urine     Urge urinary incontinence     Urinary incontinence     unspec.          Past Surgical History:   Procedure Laterality Date    BLADDER SUSPENSION      CATARACT REMOVAL Bilateral     HYSTERECTOMY (CERVIX STATUS UNKNOWN)      fibroids    OVARY REMOVAL Bilateral     benign mass    TOTAL KNEE ARTHROPLASTY Right         Current Outpatient Medications   Medication Sig Dispense Refill    ascorbic acid (VITAMIN C) 1000 MG tablet Take 1,000 mg by mouth daily      Calcium Carbonate-Vitamin D 600-3. 125 MG-MCG TABS Take 1 tablet by mouth daily      cephALEXin (KEFLEX) 250 MG capsule Take 250 mg by mouth daily      co-enzyme Q-10 30 MG capsule Take 30 mg by mouth      fluocinonide (LIDEX) 0.05 % external solution Apply to cotton tipped applicator to apply to ear canals once as needed      glucosamine sulfate 750 MG TABS 750 mg       No current facility-administered medications for this visit. Allergies   Allergen Reactions    Latex Anaphylaxis    Codeine Nausea And Vomiting    Doxycycline Hives     States can take with benadryl    Nitrofurantoin Hives and Rash    Penicillins Hives     Other reaction(s): mild rash/itching      Sulfa Antibiotics Hives     Other reaction(s): mild rash/itching    Tetracycline Hives    Scopolamine Other (See Comments)     Other reaction(s): cardiac dysrhythmia  Patient reports A-Fib arrythmia    Formaldehyde Rash    Methenamine Hippurate Rash    Thallous Chloride Tl 201 Rash        Social History     Socioeconomic History    Marital status:       Spouse name: Not on file    Number of children: Not on file    Years of education: Not on file    Highest education level: Not on file   Occupational History    Not on file   Tobacco Use    Smoking status: Never    Smokeless tobacco: Never   Substance and Sexual Activity    Alcohol use: No    Drug use: No    Sexual activity: Not on file   Other Topics Concern    Not on file   Social History Narrative    Not on file     Social Determinants of Health     Financial Resource Strain: Not on file   Food Insecurity: Not on file Transportation Needs: Not on file   Physical Activity: Not on file   Stress: Not on file   Social Connections: Not on file   Intimate Partner Violence: Not on file   Housing Stability: Not on file        Family History   Problem Relation Age of Onset    Cancer Brother         gastric    Lung Cancer Brother     Cancer Mother         Gastric     Heart Disease Father     Heart Disease Sister            /61   Pulse 70   Temp 97.7 °F (36.5 °C) (Temporal)   Resp 18   Ht 5' 7\" (1.702 m)   Wt 127 lb (57.6 kg)   SpO2 97%   BMI 19.89 kg/m²      Review of Systems   Cardiovascular:         No syncope   Neurological:  Negative for dizziness. .  Denies frequent falls. No leg weakness      Physical Exam  Constitutional:       General: She is in acute distress. HENT:      Head:      Comments: Healing 18 mm horizontal scalp laceration in right posterior scalp, with 3 staples  Eyes:      General: No scleral icterus. Conjunctiva/sclera: Conjunctivae normal.   Cardiovascular:      Rate and Rhythm: Normal rate and regular rhythm. Pulmonary:      Effort: Pulmonary effort is normal.      Breath sounds: Normal breath sounds. Musculoskeletal:      Comments: No clubbing, cyanosis, or edema. Calves nontender, no cords. Extremely tender over sacrum and coccyx, no ecchymosis, no bony instability, warmth, streaks, bleeding, or discharge. Skin:     General: Skin is warm and dry. Neurological:      Mental Status: She is alert and oriented to person, place, and time. Comments: Obvious pain with walking, gait otherwise normal.   Psychiatric:         Mood and Affect: Mood normal.                Arielle Lu was seen today for follow-up. Diagnoses and all orders for this visit:    Coccydynia  Comments: Following trauma. Given order for x-ray of sacrum and coccyx. May safely take Tylenol up to 1000 mg 3 times daily, ice/heat, Voltaren gel, donut pillow  Orders:  -     XR SACRUM COCCYX (MIN 2 VIEWS);  Future 2) scalp laceration              Return here in 2 to 4 days for removal of         staples. Tetanus shot documented in 2017, if not    given a booster in the ER, discussed next visit. No follow-up provider specified.          Thai Davalos MD

## 2023-02-22 NOTE — TELEPHONE ENCOUNTER
Let her know there is no fracture on her x-ray of her coccyx, continue symptomatic care as we discussed.
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
## 2023-04-18 ENCOUNTER — OFFICE VISIT (OUTPATIENT)
Age: 88
End: 2023-04-18
Payer: MEDICARE

## 2023-04-18 VITALS
SYSTOLIC BLOOD PRESSURE: 132 MMHG | HEART RATE: 64 BPM | DIASTOLIC BLOOD PRESSURE: 58 MMHG | BODY MASS INDEX: 19.93 KG/M2 | TEMPERATURE: 96 F | HEIGHT: 67 IN | RESPIRATION RATE: 18 BRPM | WEIGHT: 127 LBS | OXYGEN SATURATION: 96 %

## 2023-04-18 DIAGNOSIS — E78.00 PURE HYPERCHOLESTEROLEMIA, UNSPECIFIED: ICD-10-CM

## 2023-04-18 DIAGNOSIS — R35.1 NOCTURIA: Primary | ICD-10-CM

## 2023-04-18 DIAGNOSIS — I47.1 PAROXYSMAL SVT (SUPRAVENTRICULAR TACHYCARDIA) (HCC): ICD-10-CM

## 2023-04-18 DIAGNOSIS — N39.0 FREQUENT UTI: ICD-10-CM
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
## 2023-04-18 PROCEDURE — 99211 OFF/OP EST MAY X REQ PHY/QHP: CPT

## 2023-04-18 PROCEDURE — G8399 PT W/DXA RESULTS DOCUMENT: HCPCS | Performed by: INTERNAL MEDICINE

## 2023-04-18 PROCEDURE — G8420 CALC BMI NORM PARAMETERS: HCPCS | Performed by: INTERNAL MEDICINE

## 2023-04-18 PROCEDURE — 1123F ACP DISCUSS/DSCN MKR DOCD: CPT | Performed by: INTERNAL MEDICINE

## 2023-04-18 PROCEDURE — 99214 OFFICE O/P EST MOD 30 MIN: CPT | Performed by: INTERNAL MEDICINE

## 2023-04-18 PROCEDURE — 1036F TOBACCO NON-USER: CPT | Performed by: INTERNAL MEDICINE

## 2023-04-18 PROCEDURE — 1090F PRES/ABSN URINE INCON ASSESS: CPT | Performed by: INTERNAL MEDICINE

## 2023-04-18 PROCEDURE — G8427 DOCREV CUR MEDS BY ELIG CLIN: HCPCS | Performed by: INTERNAL MEDICINE

## 2023-04-18 ASSESSMENT — PATIENT HEALTH QUESTIONNAIRE - PHQ9
1. LITTLE INTEREST OR PLEASURE IN DOING THINGS: 0
SUM OF ALL RESPONSES TO PHQ9 QUESTIONS 1 & 2: 0
SUM OF ALL RESPONSES TO PHQ QUESTIONS 1-9: 0
2. FEELING DOWN, DEPRESSED OR HOPELESS: 0
SUM OF ALL RESPONSES TO PHQ QUESTIONS 1-9: 0

## 2023-04-18 ASSESSMENT — ENCOUNTER SYMPTOMS
SHORTNESS OF BREATH: 0
BLOOD IN STOOL: 0

## 2023-04-18 NOTE — PROGRESS NOTES
Giacomo Neal presents today for No chief complaint on file. 1. \"Have you been to the ER, urgent care clinic since your last visit? Hospitalized since your last visit? \" no    2. \"Have you seen or consulted any other health care providers outside of the [address] [place] [address] since your last visit? \" no     3. For patients aged 39-70: Has the patient had a colonoscopy / FIT/ Cologuard? NA - based on age      If the patient is female:    4. For patients aged 41-77: Has the patient had a mammogram within the past 2 years? NA - based on age or sex      11. For patients aged 21-65: Has the patient had a pap smear?  NA - based on age or sex
clubbing, cyanosis, or edema. Calves nontender, no cords. Skin:     General: Skin is warm and dry. Comments: Small scab medial to right scapula, no ECM. Neurological:      Mental Status: She is alert and oriented to person, place, and time. Psychiatric:         Mood and Affect: Mood normal.                Lupe Hodgson was seen today for follow-up chronic condition. Diagnoses and all orders for this visit:    Nocturia  Comments:  Limits evening fluids. Nothing to suggest sleep apnea or CHF, if cannot get urology samples, try GoodRx to obtain medication. Orders:  -     Comprehensive Metabolic Panel; Future    Tick bite, subsequent encounter  Comments:  Took doxycycline prophylactically. No ECM on exam.    Frequent UTI  Comments: On antibiotic prophylaxis, no current UTI symptoms. Sees urology. Orders:  -     Urinalysis with Microscopic; Future  -     Comprehensive Metabolic Panel; Future    Pure hypercholesterolemia, unspecified  Comments:  Fasting panel shortly before next visit. Orders:  -     Lipid Panel; Future  -     Comprehensive Metabolic Panel; Future    Paroxysmal SVT (supraventricular tachycardia) (HCC)  Comments:  10 years ago,believes due to a medication ( scopolamine), has not recurred  Orders:  -     Comprehensive Metabolic Panel; Future         No follow-up provider specified.          Marta Campbell MD

## 2023-06-20 ENCOUNTER — TELEPHONE (OUTPATIENT)
Age: 88
End: 2023-06-20

## 2023-06-20 DIAGNOSIS — R30.0 DYSURIA: Primary | ICD-10-CM

## 2023-06-20 PROCEDURE — PBSHW AMB POC URINALYSIS DIP STICK MANUAL W/O MICRO: Performed by: INTERNAL MEDICINE

## 2023-06-20 NOTE — TELEPHONE ENCOUNTER
Patient stating she has a UTI and requested an appt for tomorrow. Advised patient Dr. Apple Hayes is not in the office tomorrow and offered to schedule with another provider. Pt stated she would go to urgent care before she sees another doctor here. She wants to know if she can just come in and leave a urine sample.

## 2023-06-20 NOTE — TELEPHONE ENCOUNTER
Yes, have her leave a urine here either before 3:00PM today, or early tomorrow morning, give it to Miranda to dip and have him give me the results

## 2023-06-21 ENCOUNTER — HOSPITAL ENCOUNTER (OUTPATIENT)
Facility: HOSPITAL | Age: 88
Setting detail: SPECIMEN
Discharge: HOME | End: 2023-06-24
Payer: MEDICARE

## 2023-06-21 ENCOUNTER — TELEPHONE (OUTPATIENT)
Age: 88
End: 2023-06-21

## 2023-06-21 DIAGNOSIS — N39.0 FREQUENT UTI: ICD-10-CM

## 2023-06-21 DIAGNOSIS — N39.0 FREQUENT UTI: Primary | ICD-10-CM

## 2023-06-21 LAB
BILIRUBIN, URINE, POC: NEGATIVE
BLOOD URINE, POC: ABNORMAL
GLUCOSE URINE, POC: NEGATIVE
KETONES, URINE, POC: NEGATIVE
LEUKOCYTE ESTERASE, URINE, POC: ABNORMAL
NITRITE, URINE, POC: NEGATIVE
PH, URINE, POC: 5 (ref 4.6–8)
PROTEIN,URINE, POC: NEGATIVE
SPECIFIC GRAVITY, URINE, POC: 1.01 (ref 1–1.03)
URINALYSIS CLARITY, POC: CLEAR
URINALYSIS COLOR, POC: YELLOW
UROBILINOGEN, POC: ABNORMAL

## 2023-06-21 PROCEDURE — 87077 CULTURE AEROBIC IDENTIFY: CPT

## 2023-06-21 PROCEDURE — 87086 URINE CULTURE/COLONY COUNT: CPT

## 2023-06-21 PROCEDURE — 87186 SC STD MICRODIL/AGAR DIL: CPT

## 2023-06-21 NOTE — TELEPHONE ENCOUNTER
Let her know her urine does not look like a severe urinary tract infection. Since she is allergic to so many antibiotics, we will send it for culture and treat if it grows out bacteria.     Please send urine for culture diagnosis dysuria

## 2023-06-23 ENCOUNTER — TELEPHONE (OUTPATIENT)
Age: 88
End: 2023-06-23

## 2023-06-23 DIAGNOSIS — R30.0 DYSURIA: Primary | ICD-10-CM

## 2023-06-23 RX ORDER — CIPROFLOXACIN 250 MG/1
250 TABLET, FILM COATED ORAL 2 TIMES DAILY
Qty: 10 TABLET | Refills: 0 | Status: SHIPPED | OUTPATIENT
Start: 2023-06-23 | End: 2023-06-28

## 2023-06-23 NOTE — TELEPHONE ENCOUNTER
Let her know she does have a UTI, antibiotic sensitivities not back yet, but there is only one oral medication for UTI that she is not allergic to.   I have sent Cipro to her pharmacy, will let her know next week if needs to change antibiotic

## 2023-06-24 ENCOUNTER — TELEPHONE (OUTPATIENT)
Age: 88
End: 2023-06-24

## 2023-06-24 LAB
BACTERIA SPEC CULT: ABNORMAL
CC UR VC: ABNORMAL
SERVICE CMNT-IMP: ABNORMAL

## 2023-06-24 RX ORDER — NITROFURANTOIN 25; 75 MG/1; MG/1
100 CAPSULE ORAL 2 TIMES DAILY
Qty: 14 CAPSULE | Refills: 0 | Status: SHIPPED | OUTPATIENT
Start: 2023-06-24 | End: 2023-07-01

## 2023-06-26 ENCOUNTER — TELEPHONE (OUTPATIENT)
Age: 88
End: 2023-06-26

## 2023-07-11 ENCOUNTER — HOSPITAL ENCOUNTER (OUTPATIENT)
Facility: HOSPITAL | Age: 88
Setting detail: SPECIMEN
Discharge: HOME | End: 2023-07-14
Payer: MEDICARE

## 2023-07-11 ENCOUNTER — OFFICE VISIT (OUTPATIENT)
Age: 88
End: 2023-07-11
Payer: MEDICARE

## 2023-07-11 VITALS
TEMPERATURE: 97.1 F | BODY MASS INDEX: 19.93 KG/M2 | OXYGEN SATURATION: 96 % | SYSTOLIC BLOOD PRESSURE: 127 MMHG | WEIGHT: 127 LBS | HEIGHT: 67 IN | DIASTOLIC BLOOD PRESSURE: 85 MMHG | RESPIRATION RATE: 18 BRPM | HEART RATE: 71 BPM

## 2023-07-11 DIAGNOSIS — N39.0 FREQUENT UTI: ICD-10-CM

## 2023-07-11 DIAGNOSIS — G89.29 CHRONIC LOW BACK PAIN, UNSPECIFIED BACK PAIN LATERALITY, UNSPECIFIED WHETHER SCIATICA PRESENT: ICD-10-CM

## 2023-07-11 DIAGNOSIS — N32.81 OVERACTIVE BLADDER: Primary | ICD-10-CM

## 2023-07-11 DIAGNOSIS — M54.50 CHRONIC LOW BACK PAIN, UNSPECIFIED BACK PAIN LATERALITY, UNSPECIFIED WHETHER SCIATICA PRESENT: ICD-10-CM

## 2023-07-11 LAB
BILIRUBIN, URINE, POC: NEGATIVE
BLOOD URINE, POC: NEGATIVE
GLUCOSE URINE, POC: NEGATIVE
KETONES, URINE, POC: NEGATIVE
LEUKOCYTE ESTERASE, URINE, POC: NEGATIVE
NITRITE, URINE, POC: POSITIVE
PH, URINE, POC: 7 (ref 4.6–8)
PROTEIN,URINE, POC: NEGATIVE
SPECIFIC GRAVITY, URINE, POC: 1.02 (ref 1–1.03)
URINALYSIS CLARITY, POC: CLEAR
URINALYSIS COLOR, POC: YELLOW
UROBILINOGEN, POC: ABNORMAL

## 2023-07-11 PROCEDURE — 99214 OFFICE O/P EST MOD 30 MIN: CPT | Performed by: INTERNAL MEDICINE

## 2023-07-11 PROCEDURE — G8399 PT W/DXA RESULTS DOCUMENT: HCPCS | Performed by: INTERNAL MEDICINE

## 2023-07-11 PROCEDURE — 1036F TOBACCO NON-USER: CPT | Performed by: INTERNAL MEDICINE

## 2023-07-11 PROCEDURE — 87086 URINE CULTURE/COLONY COUNT: CPT

## 2023-07-11 PROCEDURE — 1123F ACP DISCUSS/DSCN MKR DOCD: CPT | Performed by: INTERNAL MEDICINE

## 2023-07-11 PROCEDURE — PBSHW AMB POC URINALYSIS DIP STICK AUTO W/O MICRO: Performed by: INTERNAL MEDICINE

## 2023-07-11 PROCEDURE — 99211 OFF/OP EST MAY X REQ PHY/QHP: CPT | Performed by: INTERNAL MEDICINE

## 2023-07-11 PROCEDURE — G8427 DOCREV CUR MEDS BY ELIG CLIN: HCPCS | Performed by: INTERNAL MEDICINE

## 2023-07-11 PROCEDURE — 87186 SC STD MICRODIL/AGAR DIL: CPT

## 2023-07-11 PROCEDURE — G8420 CALC BMI NORM PARAMETERS: HCPCS | Performed by: INTERNAL MEDICINE

## 2023-07-11 PROCEDURE — 87077 CULTURE AEROBIC IDENTIFY: CPT

## 2023-07-11 PROCEDURE — 1090F PRES/ABSN URINE INCON ASSESS: CPT | Performed by: INTERNAL MEDICINE

## 2023-07-11 PROCEDURE — 81003 URINALYSIS AUTO W/O SCOPE: CPT | Performed by: INTERNAL MEDICINE

## 2023-07-11 RX ORDER — OXYBUTYNIN 3.9 MG/D
1 PATCH TRANSDERMAL
Qty: 8 PATCH | Refills: 3 | Status: SHIPPED | OUTPATIENT
Start: 2023-07-13

## 2023-07-11 RX ORDER — SOLIFENACIN SUCCINATE 5 MG/1
TABLET, FILM COATED ORAL
COMMUNITY
End: 2023-07-11

## 2023-07-11 ASSESSMENT — ENCOUNTER SYMPTOMS
BLOOD IN STOOL: 0
BACK PAIN: 1
SHORTNESS OF BREATH: 0

## 2023-07-11 ASSESSMENT — PATIENT HEALTH QUESTIONNAIRE - PHQ9
SUM OF ALL RESPONSES TO PHQ9 QUESTIONS 1 & 2: 0
SUM OF ALL RESPONSES TO PHQ QUESTIONS 1-9: 0
2. FEELING DOWN, DEPRESSED OR HOPELESS: 0
SUM OF ALL RESPONSES TO PHQ QUESTIONS 1-9: 0
1. LITTLE INTEREST OR PLEASURE IN DOING THINGS: 0

## 2023-07-11 NOTE — PROGRESS NOTES
Urinary Frequency   Associated symptoms include frequency. Pertinent negatives include no hematuria. Kaylie Bell is here to discuss her overactive bladder, more specifically her nocturia. She drinks no fluids after her evening meal.  She has frequent UTIs and is on Keflex prophylactically. She was treated recently with Macrobid for UTI, reports her daughter, who is a nurse, told her to take it with Benadryl she reports it contains sulfa. She does not want me to remove it from her medication allergies, although I told her that her last UTI would have had to be treated with IV antibiotics since she has so many drug intolerances. Denies glaucoma. Recent RFA, states it is the first shot that has given her any meaningful relief, although she does still have midline low back pain. She wonders if/when she can have another RFA, and I asked her to discuss this with pain management at her visit next week    Past Medical History:   Diagnosis Date    Abnormal weight gain     Arthritis     Atrial fibrillation (HCC)     Cardiac arrhythmia     Chronic low back pain without sciatica     Drowsiness     Feels cold     History of artificial joint     Knee joint replacement status, right     Macrocytosis without anemia     Mitral valve disorder     MVP (mitral valve prolapse)     Osteopenia     PAF (paroxysmal atrial fibrillation) ([facility])     single episode age 70 due to scopolamine patch    PONV (postoperative nausea and vomiting)     no scopolamine patch    Recurrent UTI     Urethral caruncle     Urge incontinence of urine     Urge urinary incontinence     Urinary incontinence     unspec.          Past Surgical History:   Procedure Laterality Date    BLADDER SUSPENSION      CATARACT REMOVAL Bilateral     HYSTERECTOMY (CERVIX STATUS UNKNOWN)      fibroids    OVARY REMOVAL Bilateral     benign mass    TOTAL KNEE ARTHROPLASTY Right         Current Outpatient Medications   Medication Sig Dispense Refill    [START ON 7/13/2023]

## 2023-07-14 ENCOUNTER — TELEPHONE (OUTPATIENT)
Age: 88
End: 2023-07-14

## 2023-07-14 LAB
BACTERIA SPEC CULT: ABNORMAL
CC UR VC: ABNORMAL
SERVICE CMNT-IMP: ABNORMAL

## 2023-07-14 NOTE — TELEPHONE ENCOUNTER
Her urine is growing out bacteria, but the identification of the bacteria and its antibiotic sensitivities is pending. If she is not having fever, burning with her urine, I will let her know when the culture is back either later today or tomorrow.

## 2023-07-16 ENCOUNTER — TELEPHONE (OUTPATIENT)
Age: 88
End: 2023-07-16

## 2023-07-16 DIAGNOSIS — R82.71 BACTERIURIA: Primary | ICD-10-CM

## 2023-07-16 RX ORDER — NITROFURANTOIN 25; 75 MG/1; MG/1
100 CAPSULE ORAL 2 TIMES DAILY
Qty: 10 CAPSULE | Refills: 0 | Status: SHIPPED | OUTPATIENT
Start: 2023-07-16 | End: 2023-07-21

## 2023-10-17 ENCOUNTER — TELEPHONE (OUTPATIENT)
Age: 88
End: 2023-10-17

## 2023-10-17 ENCOUNTER — HOSPITAL ENCOUNTER (OUTPATIENT)
Facility: HOSPITAL | Age: 88
Setting detail: SPECIMEN
Discharge: HOME | End: 2023-10-20
Payer: MEDICARE

## 2023-10-17 DIAGNOSIS — N39.0 FREQUENT UTI: ICD-10-CM

## 2023-10-17 DIAGNOSIS — R35.1 NOCTURIA: ICD-10-CM

## 2023-10-17 DIAGNOSIS — I47.10 PAROXYSMAL SVT (SUPRAVENTRICULAR TACHYCARDIA): ICD-10-CM

## 2023-10-17 DIAGNOSIS — E78.00 PURE HYPERCHOLESTEROLEMIA, UNSPECIFIED: ICD-10-CM

## 2023-10-17 LAB
ALBUMIN SERPL-MCNC: 3.8 G/DL (ref 3.4–5)
ALBUMIN/GLOB SERPL: 1.1 (ref 0.8–1.7)
ALP SERPL-CCNC: 68 U/L (ref 45–117)
ALT SERPL-CCNC: 25 U/L (ref 13–56)
ANION GAP SERPL CALC-SCNC: 4 MMOL/L (ref 3–18)
APPEARANCE UR: CLEAR
AST SERPL-CCNC: 26 U/L (ref 10–38)
BACTERIA URNS QL MICRO: ABNORMAL /HPF
BILIRUB SERPL-MCNC: 0.4 MG/DL (ref 0.2–1)
BILIRUB UR QL: NEGATIVE
BUN SERPL-MCNC: 28 MG/DL (ref 7–18)
BUN/CREAT SERPL: 28 (ref 12–20)
CALCIUM SERPL-MCNC: 9.4 MG/DL (ref 8.5–10.1)
CHLORIDE SERPL-SCNC: 103 MMOL/L (ref 100–111)
CHOLEST SERPL-MCNC: 185 MG/DL
CO2 SERPL-SCNC: 32 MMOL/L (ref 21–32)
COLOR UR: YELLOW
CREAT SERPL-MCNC: 1 MG/DL (ref 0.6–1.3)
EPITH CASTS URNS QL MICRO: ABNORMAL /LPF (ref 0–5)
GLOBULIN SER CALC-MCNC: 3.4 G/DL (ref 2–4)
GLUCOSE SERPL-MCNC: 85 MG/DL (ref 74–99)
GLUCOSE UR STRIP.AUTO-MCNC: NEGATIVE MG/DL
HDLC SERPL-MCNC: 63 MG/DL (ref 40–60)
HDLC SERPL: 2.9 (ref 0–5)
HGB UR QL STRIP: NEGATIVE
KETONES UR QL STRIP.AUTO: NEGATIVE MG/DL
LDLC SERPL CALC-MCNC: 103.6 MG/DL (ref 0–100)
LEUKOCYTE ESTERASE UR QL STRIP.AUTO: ABNORMAL
LIPID PANEL: ABNORMAL
NITRITE UR QL STRIP.AUTO: NEGATIVE
PH UR STRIP: 6.5 (ref 5–8)
POTASSIUM SERPL-SCNC: 4.4 MMOL/L (ref 3.5–5.5)
PROT SERPL-MCNC: 7.2 G/DL (ref 6.4–8.2)
PROT UR STRIP-MCNC: NEGATIVE MG/DL
RBC #/AREA URNS HPF: ABNORMAL /HPF (ref 0–5)
SODIUM SERPL-SCNC: 139 MMOL/L (ref 136–145)
SP GR UR REFRACTOMETRY: 1.01 (ref 1–1.03)
TRIGL SERPL-MCNC: 92 MG/DL
UROBILINOGEN UR QL STRIP.AUTO: 0.2 EU/DL (ref 0.2–1)
VLDLC SERPL CALC-MCNC: 18.4 MG/DL
WBC URNS QL MICRO: ABNORMAL /HPF (ref 0–4)

## 2023-10-17 PROCEDURE — 80053 COMPREHEN METABOLIC PANEL: CPT

## 2023-10-17 PROCEDURE — 80061 LIPID PANEL: CPT

## 2023-10-17 PROCEDURE — 81001 URINALYSIS AUTO W/SCOPE: CPT

## 2023-10-17 PROCEDURE — 36415 COLL VENOUS BLD VENIPUNCTURE: CPT

## 2023-10-22 SDOH — HEALTH STABILITY: PHYSICAL HEALTH: ON AVERAGE, HOW MANY DAYS PER WEEK DO YOU ENGAGE IN MODERATE TO STRENUOUS EXERCISE (LIKE A BRISK WALK)?: 2 DAYS

## 2023-10-22 SDOH — HEALTH STABILITY: PHYSICAL HEALTH: ON AVERAGE, HOW MANY MINUTES DO YOU ENGAGE IN EXERCISE AT THIS LEVEL?: 30 MIN

## 2023-10-22 ASSESSMENT — PATIENT HEALTH QUESTIONNAIRE - PHQ9
1. LITTLE INTEREST OR PLEASURE IN DOING THINGS: 0
SUM OF ALL RESPONSES TO PHQ QUESTIONS 1-9: 0
SUM OF ALL RESPONSES TO PHQ9 QUESTIONS 1 & 2: 0
SUM OF ALL RESPONSES TO PHQ QUESTIONS 1-9: 0
2. FEELING DOWN, DEPRESSED OR HOPELESS: 0

## 2023-10-22 ASSESSMENT — LIFESTYLE VARIABLES
HOW OFTEN DO YOU HAVE SIX OR MORE DRINKS ON ONE OCCASION: 1
HOW OFTEN DO YOU HAVE A DRINK CONTAINING ALCOHOL: 1
HOW MANY STANDARD DRINKS CONTAINING ALCOHOL DO YOU HAVE ON A TYPICAL DAY: PATIENT DOES NOT DRINK
HOW OFTEN DO YOU HAVE A DRINK CONTAINING ALCOHOL: NEVER
HOW MANY STANDARD DRINKS CONTAINING ALCOHOL DO YOU HAVE ON A TYPICAL DAY: 0

## 2023-10-24 ENCOUNTER — OFFICE VISIT (OUTPATIENT)
Age: 88
End: 2023-10-24
Payer: MEDICARE

## 2023-10-24 VITALS
SYSTOLIC BLOOD PRESSURE: 127 MMHG | RESPIRATION RATE: 18 BRPM | HEART RATE: 59 BPM | HEIGHT: 67 IN | OXYGEN SATURATION: 96 % | DIASTOLIC BLOOD PRESSURE: 59 MMHG | WEIGHT: 128 LBS | TEMPERATURE: 97.7 F | BODY MASS INDEX: 20.09 KG/M2

## 2023-10-24 DIAGNOSIS — G89.29 CHRONIC LOW BACK PAIN, UNSPECIFIED BACK PAIN LATERALITY, UNSPECIFIED WHETHER SCIATICA PRESENT: ICD-10-CM

## 2023-10-24 DIAGNOSIS — R82.71 BACTERIURIA: ICD-10-CM

## 2023-10-24 DIAGNOSIS — M54.50 CHRONIC LOW BACK PAIN, UNSPECIFIED BACK PAIN LATERALITY, UNSPECIFIED WHETHER SCIATICA PRESENT: ICD-10-CM

## 2023-10-24 DIAGNOSIS — Z00.00 MEDICARE ANNUAL WELLNESS VISIT, SUBSEQUENT: Primary | ICD-10-CM

## 2023-10-24 LAB
BILIRUBIN, URINE, POC: NEGATIVE
BLOOD URINE, POC: ABNORMAL
GLUCOSE URINE, POC: NEGATIVE
KETONES, URINE, POC: NEGATIVE
LEUKOCYTE ESTERASE, URINE, POC: ABNORMAL
NITRITE, URINE, POC: NEGATIVE
PH, URINE, POC: 6 (ref 4.6–8)
PROTEIN,URINE, POC: NEGATIVE
SPECIFIC GRAVITY, URINE, POC: 1.02 (ref 1–1.03)
URINALYSIS CLARITY, POC: CLEAR
URINALYSIS COLOR, POC: YELLOW
UROBILINOGEN, POC: ABNORMAL

## 2023-10-24 PROCEDURE — 81003 URINALYSIS AUTO W/O SCOPE: CPT | Performed by: INTERNAL MEDICINE

## 2023-10-24 PROCEDURE — G8484 FLU IMMUNIZE NO ADMIN: HCPCS | Performed by: INTERNAL MEDICINE

## 2023-10-24 PROCEDURE — G8427 DOCREV CUR MEDS BY ELIG CLIN: HCPCS | Performed by: INTERNAL MEDICINE

## 2023-10-24 PROCEDURE — PBSHW INFLUENZA, FLUAD, (AGE 65 Y+), IM, PF, 0.5 ML: Performed by: INTERNAL MEDICINE

## 2023-10-24 PROCEDURE — 1090F PRES/ABSN URINE INCON ASSESS: CPT | Performed by: INTERNAL MEDICINE

## 2023-10-24 PROCEDURE — 99214 OFFICE O/P EST MOD 30 MIN: CPT | Performed by: INTERNAL MEDICINE

## 2023-10-24 PROCEDURE — 99497 ADVNCD CARE PLAN 30 MIN: CPT | Performed by: INTERNAL MEDICINE

## 2023-10-24 PROCEDURE — PBSHW AMB POC URINALYSIS DIP STICK AUTO W/O MICRO: Performed by: INTERNAL MEDICINE

## 2023-10-24 PROCEDURE — 1036F TOBACCO NON-USER: CPT | Performed by: INTERNAL MEDICINE

## 2023-10-24 PROCEDURE — G0439 PPPS, SUBSEQ VISIT: HCPCS | Performed by: INTERNAL MEDICINE

## 2023-10-24 PROCEDURE — 1123F ACP DISCUSS/DSCN MKR DOCD: CPT | Performed by: INTERNAL MEDICINE

## 2023-10-24 PROCEDURE — G8420 CALC BMI NORM PARAMETERS: HCPCS | Performed by: INTERNAL MEDICINE

## 2023-10-24 PROCEDURE — G8399 PT W/DXA RESULTS DOCUMENT: HCPCS | Performed by: INTERNAL MEDICINE

## 2023-10-24 PROCEDURE — 90694 VACC AIIV4 NO PRSRV 0.5ML IM: CPT | Performed by: INTERNAL MEDICINE

## 2023-10-24 ASSESSMENT — ENCOUNTER SYMPTOMS
SHORTNESS OF BREATH: 0
BACK PAIN: 1
BLOOD IN STOOL: 0

## 2023-10-25 NOTE — ACP (ADVANCE CARE PLANNING)
Advance Care Planning     Advance Care Planning (ACP) Physician/NP/PA Conversation    Date of Conversation: 10/24/2023  Conducted with: Patient with Decision Making Capacity    Healthcare Decision Maker:      Primary Decision Maker: Dm Cardona - Child - [phone]    Secondary Decision Maker: Avi Cockayne - Child - [phone]    Click here to complete Healthcare Decision Makers including selection of the Healthcare Decision Maker Relationship (ie \"Primary\")      Care Preferences:    Hospitalization: \"If your health worsens and it becomes clear that your chance of recovery is unlikely, what would be your preference regarding hospitalization? \"  The patient would prefer hospitalization. Ventilation: \"If you were unable to breath on your own and your chance of recovery was unlikely, what would be your preference about the use of a ventilator (breathing machine) if it was available to you? \"  The patient is unsure. Resuscitation: \"In the event your heart stopped as a result of an underlying serious health condition, would you want attempts made to restart your heart, or would you prefer a natural death? \"  Yes, attempt to resuscitate.     artificial nutrition no feeding tubes    Conversation Outcomes / Follow-Up Plan:  ACP complete - no further action today  Reviewed DNR/DNI and patient elects Full Code (Attempt Resuscitation)    Length of Voluntary ACP Conversation in minutes:  16 minutes    Terry Howard MD

## 2023-10-31 ENCOUNTER — TELEPHONE (OUTPATIENT)
Age: 88
End: 2023-10-31

## 2024-01-31 ENCOUNTER — TELEPHONE (OUTPATIENT)
Age: 89
End: 2024-01-31

## 2024-01-31 DIAGNOSIS — N39.0 FREQUENT UTI: Primary | ICD-10-CM

## 2024-01-31 LAB
BILIRUBIN, URINE, POC: NEGATIVE
BLOOD URINE, POC: NORMAL
GLUCOSE URINE, POC: NEGATIVE
KETONES, URINE, POC: NEGATIVE
LEUKOCYTE ESTERASE, URINE, POC: NEGATIVE
NITRITE, URINE, POC: NEGATIVE
PH, URINE, POC: 7 (ref 4.6–8)
PROTEIN,URINE, POC: NEGATIVE
SPECIFIC GRAVITY, URINE, POC: 1.02 (ref 1–1.03)
URINALYSIS CLARITY, POC: CLEAR
URINALYSIS COLOR, POC: YELLOW
UROBILINOGEN, POC: NORMAL

## 2024-01-31 PROCEDURE — PBSHW AMB POC URINALYSIS DIP STICK AUTO W/O MICRO: Performed by: INTERNAL MEDICINE

## 2024-01-31 NOTE — TELEPHONE ENCOUNTER
Okay to leave a urine, please dip it and put results in the system so I can look at them, I am not in the office today. Dx frequent Uti

## 2024-01-31 NOTE — TELEPHONE ENCOUNTER
----- Message from Gildardo Colón sent at 1/31/2024  4:40 PM EST -----  Regarding: poc ua  See Results

## 2024-01-31 NOTE — TELEPHONE ENCOUNTER
Regarding: UT  Contact: [phone]  ----- Message from Carlos Cheng MA sent at 1/31/2024 10:12 AM EST -----       ----- Message from Berkley Clarke to Angelina Vega MD sent at 1/31/2024 10:06 AM -----   Message is for Gildardo:  May I please drop off a specimen.  I have a cup.  Tks!  Berkley Clarke

## 2024-02-01 ENCOUNTER — HOSPITAL ENCOUNTER (OUTPATIENT)
Facility: HOSPITAL | Age: 89
Setting detail: SPECIMEN
Discharge: HOME | End: 2024-02-01
Payer: MEDICARE

## 2024-02-01 DIAGNOSIS — N39.0 FREQUENT UTI: Primary | ICD-10-CM

## 2024-02-01 DIAGNOSIS — N39.0 FREQUENT UTI: ICD-10-CM

## 2024-02-01 PROCEDURE — 87086 URINE CULTURE/COLONY COUNT: CPT

## 2024-02-03 LAB
BACTERIA SPEC CULT: ABNORMAL
CC UR VC: ABNORMAL
SERVICE CMNT-IMP: ABNORMAL

## 2024-02-05 ENCOUNTER — TELEPHONE (OUTPATIENT)
Age: 89
End: 2024-02-05

## 2024-02-28 NOTE — TELEPHONE ENCOUNTER
Patient called the office asking if I was able to get in contact with Dr. Eusebio Morgan office told I left message yesterday had not heard back as of yet but I will follow up on this afternoon. yes

## 2024-04-20 SDOH — ECONOMIC STABILITY: FOOD INSECURITY: WITHIN THE PAST 12 MONTHS, YOU WORRIED THAT YOUR FOOD WOULD RUN OUT BEFORE YOU GOT MONEY TO BUY MORE.: NEVER TRUE

## 2024-04-20 SDOH — ECONOMIC STABILITY: TRANSPORTATION INSECURITY
IN THE PAST 12 MONTHS, HAS LACK OF TRANSPORTATION KEPT YOU FROM MEETINGS, WORK, OR FROM GETTING THINGS NEEDED FOR DAILY LIVING?: NO

## 2024-04-20 SDOH — ECONOMIC STABILITY: INCOME INSECURITY: HOW HARD IS IT FOR YOU TO PAY FOR THE VERY BASICS LIKE FOOD, HOUSING, MEDICAL CARE, AND HEATING?: NOT HARD AT ALL

## 2024-04-20 SDOH — ECONOMIC STABILITY: HOUSING INSECURITY
IN THE LAST 12 MONTHS, WAS THERE A TIME WHEN YOU DID NOT HAVE A STEADY PLACE TO SLEEP OR SLEPT IN A SHELTER (INCLUDING NOW)?: NO

## 2024-04-20 SDOH — ECONOMIC STABILITY: FOOD INSECURITY: WITHIN THE PAST 12 MONTHS, THE FOOD YOU BOUGHT JUST DIDN'T LAST AND YOU DIDN'T HAVE MONEY TO GET MORE.: NEVER TRUE

## 2024-04-22 NOTE — PROGRESS NOTES
Berkley Clarke presents today for Chronic condition.              1. \"Have you been to the ER, urgent care clinic since your last visit? Yes 3- [facility] Hospitalized since your last visit?\" no    2. \"Have you seen or consulted any other health care providers outside of the [facility] since your last visit?\" no     3. For patients aged 45-75: Has the patient had a colonoscopy / FIT/ Cologuard? NA - based on age      If the patient is female:    4. For patients aged 40-74: Has the patient had a mammogram within the past 2 years? NA - based on age or sex      5. For patients aged 21-65: Has the patient had a pap smear? Hysterectomy

## 2024-04-23 ENCOUNTER — OFFICE VISIT (OUTPATIENT)
Age: 89
End: 2024-04-23
Payer: MEDICARE

## 2024-04-23 VITALS
HEIGHT: 67 IN | SYSTOLIC BLOOD PRESSURE: 124 MMHG | TEMPERATURE: 97.8 F | BODY MASS INDEX: 19.93 KG/M2 | WEIGHT: 127 LBS | OXYGEN SATURATION: 99 % | HEART RATE: 66 BPM | DIASTOLIC BLOOD PRESSURE: 64 MMHG | RESPIRATION RATE: 18 BRPM

## 2024-04-23 DIAGNOSIS — N39.0 FREQUENT UTI: ICD-10-CM

## 2024-04-23 DIAGNOSIS — N39.46 MIXED STRESS AND URGE URINARY INCONTINENCE: Primary | ICD-10-CM

## 2024-04-23 DIAGNOSIS — E78.00 PURE HYPERCHOLESTEROLEMIA, UNSPECIFIED: ICD-10-CM

## 2024-04-23 DIAGNOSIS — G60.9 IDIOPATHIC PERIPHERAL NEUROPATHY: ICD-10-CM

## 2024-04-23 DIAGNOSIS — R35.1 NOCTURIA: ICD-10-CM

## 2024-04-23 DIAGNOSIS — R94.4 DECREASED GFR: ICD-10-CM

## 2024-04-23 LAB
BILIRUBIN, URINE, POC: NEGATIVE
BLOOD URINE, POC: NEGATIVE
GLUCOSE URINE, POC: NEGATIVE
KETONES, URINE, POC: NEGATIVE
LEUKOCYTE ESTERASE, URINE, POC: NEGATIVE
NITRITE, URINE, POC: NEGATIVE
PH, URINE, POC: 6 (ref 4.6–8)
PROTEIN,URINE, POC: NEGATIVE
SPECIFIC GRAVITY, URINE, POC: 1.02 (ref 1–1.03)
URINALYSIS CLARITY, POC: CLEAR
URINALYSIS COLOR, POC: YELLOW
UROBILINOGEN, POC: NORMAL

## 2024-04-23 PROCEDURE — G8420 CALC BMI NORM PARAMETERS: HCPCS | Performed by: INTERNAL MEDICINE

## 2024-04-23 PROCEDURE — 1123F ACP DISCUSS/DSCN MKR DOCD: CPT | Performed by: INTERNAL MEDICINE

## 2024-04-23 PROCEDURE — 1090F PRES/ABSN URINE INCON ASSESS: CPT | Performed by: INTERNAL MEDICINE

## 2024-04-23 PROCEDURE — 81003 URINALYSIS AUTO W/O SCOPE: CPT | Performed by: INTERNAL MEDICINE

## 2024-04-23 PROCEDURE — G8428 CUR MEDS NOT DOCUMENT: HCPCS | Performed by: INTERNAL MEDICINE

## 2024-04-23 PROCEDURE — 0509F URINE INCON PLAN DOCD: CPT | Performed by: INTERNAL MEDICINE

## 2024-04-23 PROCEDURE — 99214 OFFICE O/P EST MOD 30 MIN: CPT | Performed by: INTERNAL MEDICINE

## 2024-04-23 PROCEDURE — G2211 COMPLEX E/M VISIT ADD ON: HCPCS | Performed by: INTERNAL MEDICINE

## 2024-04-23 PROCEDURE — 1036F TOBACCO NON-USER: CPT | Performed by: INTERNAL MEDICINE

## 2024-04-23 ASSESSMENT — PATIENT HEALTH QUESTIONNAIRE - PHQ9
SUM OF ALL RESPONSES TO PHQ QUESTIONS 1-9: 0
SUM OF ALL RESPONSES TO PHQ QUESTIONS 1-9: 0
1. LITTLE INTEREST OR PLEASURE IN DOING THINGS: NOT AT ALL
SUM OF ALL RESPONSES TO PHQ QUESTIONS 1-9: 0
2. FEELING DOWN, DEPRESSED OR HOPELESS: NOT AT ALL
SUM OF ALL RESPONSES TO PHQ QUESTIONS 1-9: 0
SUM OF ALL RESPONSES TO PHQ9 QUESTIONS 1 & 2: 0

## 2024-04-24 ASSESSMENT — ENCOUNTER SYMPTOMS
BLOOD IN STOOL: 0
ABDOMINAL PAIN: 0
SHORTNESS OF BREATH: 0
BACK PAIN: 1

## 2024-04-24 NOTE — PROGRESS NOTES
HPI     Berkley Clarke is here for routine check.  She continues to have frequent UTIs, as well as urinary incontinence.  Reports she could not take medication for overactive bladder, including Oxytrol patch.  She sees urogynecology, and is on a daily Keflex prophylactically.  She reports she gets up 4-5 times at night to urinate, and requests a bedside commode.  I printed order given to her to take to [employer] [facility].    She reports she has had neuropathy in her feet for a long time, and a family member who is a pharmacist recommended alpha lipoic acid.  She states her neuropathy symptoms have since resolved.    After visit, realized patient left a urine for us to check    Past Medical History:   Diagnosis Date    Abnormal weight gain     Arthritis     Atrial fibrillation (HCC)     Cardiac arrhythmia     Chronic low back pain without sciatica     Drowsiness     Feels cold     History of artificial joint     Knee joint replacement status, right     Macrocytosis without anemia     Mitral valve disorder     MVP (mitral valve prolapse)     Osteopenia     PAF (paroxysmal atrial fibrillation) ([facility])     single episode age 75 due to scopolamine patch    PONV (postoperative nausea and vomiting)     no scopolamine patch    Recurrent UTI     Urethral caruncle     Urge incontinence of urine     Urge urinary incontinence     Urinary incontinence     unspec.         Past Surgical History:   Procedure Laterality Date    BLADDER SUSPENSION      CATARACT REMOVAL Bilateral     HYSTERECTOMY (CERVIX STATUS UNKNOWN)      fibroids    OVARY REMOVAL Bilateral     benign mass    TOTAL KNEE ARTHROPLASTY Right         Current Outpatient Medications   Medication Sig Dispense Refill    Alpha-Lipoic Acid (LIPOIC ACID PO) Take by mouth      cephALEXin (KEFLEX) 250 MG capsule Take 1 capsule by mouth daily for prophylaxis therapy 90 capsule 2    Multiple Vitamins-Minerals ( MACULAR HEALTH PO) Take 4 capsules by mouth Daily      aspirin 81 MG

## 2024-05-16 ENCOUNTER — OFFICE VISIT (OUTPATIENT)
Age: 89
End: 2024-05-16
Payer: MEDICARE

## 2024-05-16 VITALS
SYSTOLIC BLOOD PRESSURE: 138 MMHG | DIASTOLIC BLOOD PRESSURE: 76 MMHG | OXYGEN SATURATION: 97 % | HEART RATE: 68 BPM | HEIGHT: 67 IN | TEMPERATURE: 98.2 F | BODY MASS INDEX: 19.93 KG/M2 | WEIGHT: 127 LBS

## 2024-05-16 DIAGNOSIS — M54.50 CHRONIC LEFT-SIDED LOW BACK PAIN WITHOUT SCIATICA: Primary | ICD-10-CM

## 2024-05-16 DIAGNOSIS — G89.29 CHRONIC LEFT-SIDED LOW BACK PAIN WITHOUT SCIATICA: Primary | ICD-10-CM

## 2024-05-16 PROCEDURE — 1090F PRES/ABSN URINE INCON ASSESS: CPT | Performed by: NURSE PRACTITIONER

## 2024-05-16 PROCEDURE — G8420 CALC BMI NORM PARAMETERS: HCPCS | Performed by: NURSE PRACTITIONER

## 2024-05-16 PROCEDURE — G8428 CUR MEDS NOT DOCUMENT: HCPCS | Performed by: NURSE PRACTITIONER

## 2024-05-16 PROCEDURE — 1036F TOBACCO NON-USER: CPT | Performed by: NURSE PRACTITIONER

## 2024-05-16 PROCEDURE — 99213 OFFICE O/P EST LOW 20 MIN: CPT | Performed by: NURSE PRACTITIONER

## 2024-05-16 PROCEDURE — 1123F ACP DISCUSS/DSCN MKR DOCD: CPT | Performed by: NURSE PRACTITIONER

## 2024-05-16 NOTE — PROGRESS NOTES
[facility]  [address]  [address]  [place] [id number]  [phone] office/[phone] fax    5/17/2024    Berkley Clarke 1935 is a pleasant White (non-) female.       History of Present Illness  The patient presents for evaluation of back pain. She is accompanied by her dtr.    The patient has been under treatment for back pain for approximately 2.5 years, under the care of a pain management specialist and a neurosurgeon. However, her next appointment is not until 07/11/2024. She reports severe back pain, which she manages with alternating doses of Motrin and Tylenol. Last night, she experienced left-sided chest pain after taking Motrin, reminiscent of indigestion, which lasted a few minutes. This discomfort has previously occurred, leading her to discontinue Motrin. Her dtr queries whether the patient's back pain could be related to indigestion, as Dr. Vega prescribed Pepcid, which the patient began taking on Monday, but was not taking previously. The patient describes her pain as across her lower back and occasionally radiates down her right buttock when the pain intensifies. She quantifies her pain as a 9 on a scale of 0 to 10. She has undergone approximately 10 cortisone injections and 2 ablations, but these have not provided relief. The patient's current pain is distinct from her previous experiences, with intermittent shooting pain during ambulation. She also reports that her back feels like it is going to give way during ambulation, and she now feels as though her spine is protruding at the base, a symptom she had not previously felt. The patient reached out to pain management today, who recommended a lumbar sacral pseudo-joint steroid injection for her back pain, but she declined this option due to concerns about potential bone disintegration and lack of effectiveness in the past. She requests a new MRI since the last one was in 2020. Ice application

## 2024-05-17 NOTE — ASSESSMENT & PLAN NOTE
The patient was counseled to abstain from Motrin due to its potential to induce chest discomfort and anxiety-induced discomfort. The option of oral steroids was proposed, however, the patient declined this option. She was advised to continue using Tylenol and ice packs for her back pain. A trial of a muscle relaxer, prescribed by her neurologist, will be initiated. An MRI will be ordered to further investigate the cause of her pain.

## 2024-05-20 ENCOUNTER — HOSPITAL ENCOUNTER (OUTPATIENT)
Facility: HOSPITAL | Age: 89
Discharge: HOME | End: 2024-05-23
Payer: MEDICARE

## 2024-05-20 DIAGNOSIS — M54.50 CHRONIC LEFT-SIDED LOW BACK PAIN WITHOUT SCIATICA: ICD-10-CM

## 2024-05-20 DIAGNOSIS — G89.29 CHRONIC LEFT-SIDED LOW BACK PAIN WITHOUT SCIATICA: ICD-10-CM

## 2024-05-20 PROCEDURE — 72148 MRI LUMBAR SPINE W/O DYE: CPT

## 2024-05-28 ENCOUNTER — TELEPHONE (OUTPATIENT)
Age: 89
End: 2024-05-28

## 2024-05-28 NOTE — TELEPHONE ENCOUNTER
Let her know her MRI shows that her left sciatic nerve is being pinched, as well as the nerve above it.  This will cause low back pain, along with pain down the back of her leg and down the side of her leg and front of her calf.  If this is where she is hurting the most, an injection by the spine specialist might be helpful.

## 2024-05-29 ENCOUNTER — TELEPHONE (OUTPATIENT)
Age: 89
End: 2024-05-29

## 2024-05-29 NOTE — TELEPHONE ENCOUNTER
Patient cancelled her 6/13/2024 appointment that was scheduled.  She is asking for a sooner appointment, but no appointments available.  Please advise.

## 2024-05-29 NOTE — TELEPHONE ENCOUNTER
----- Message from Susana De La Cruz sent at 5/28/2024  2:17 PM EDT -----  Subject: Appointment Request    Reason for Call: Established Patient Appointment needed: Routine Existing   Condition Follow Up    QUESTIONS    Reason for appointment request? No appointments available during search     Additional Information for Provider? pt states she has had UTI sx for many   yrs and needs an aptmnt before June 13th.  ---------------------------------------------------------------------------  --------------  CALL BACK INFO  [phone]; OK to leave message on voicemail  ---------------------------------------------------------------------------  --------------  SCRIPT ANSWERS

## 2024-05-31 ENCOUNTER — HOSPITAL ENCOUNTER (OUTPATIENT)
Facility: HOSPITAL | Age: 89
Setting detail: SPECIMEN
End: 2024-05-31
Payer: MEDICARE

## 2024-05-31 DIAGNOSIS — R30.0 DYSURIA: ICD-10-CM

## 2024-05-31 PROCEDURE — 87186 SC STD MICRODIL/AGAR DIL: CPT

## 2024-05-31 PROCEDURE — 87088 URINE BACTERIA CULTURE: CPT

## 2024-05-31 PROCEDURE — 87086 URINE CULTURE/COLONY COUNT: CPT

## 2024-06-01 LAB
BACTERIA SPEC CULT: ABNORMAL
CC UR VC: ABNORMAL
SERVICE CMNT-IMP: ABNORMAL

## 2024-06-03 LAB
BACTERIA SPEC CULT: ABNORMAL
CC UR VC: ABNORMAL
SERVICE CMNT-IMP: ABNORMAL

## 2024-06-13 ENCOUNTER — HOSPITAL ENCOUNTER (OUTPATIENT)
Facility: HOSPITAL | Age: 89
Setting detail: SPECIMEN
Discharge: HOME | End: 2024-06-13
Payer: MEDICARE

## 2024-06-13 DIAGNOSIS — N39.0 RECURRENT UTI: ICD-10-CM

## 2024-06-13 PROCEDURE — 87086 URINE CULTURE/COLONY COUNT: CPT

## 2024-06-13 PROCEDURE — 87088 URINE BACTERIA CULTURE: CPT

## 2024-06-13 PROCEDURE — 87186 SC STD MICRODIL/AGAR DIL: CPT

## 2024-06-15 LAB
BACTERIA SPEC CULT: ABNORMAL
CC UR VC: ABNORMAL
SERVICE CMNT-IMP: ABNORMAL

## 2024-06-20 ENCOUNTER — TELEPHONE (OUTPATIENT)
Facility: CLINIC | Age: 89
End: 2024-06-20

## 2024-06-20 DIAGNOSIS — N39.0 RECURRENT UTI: Primary | ICD-10-CM

## 2024-06-20 LAB
BILIRUBIN, URINE, POC: NEGATIVE
BLOOD URINE, POC: NEGATIVE
GLUCOSE URINE, POC: NEGATIVE
KETONES, URINE, POC: NEGATIVE
LEUKOCYTE ESTERASE, URINE, POC: NEGATIVE
NITRITE, URINE, POC: NEGATIVE
PH, URINE, POC: 6.5 (ref 4.6–8)
PROTEIN,URINE, POC: NEGATIVE
SPECIFIC GRAVITY, URINE, POC: 1.02 (ref 1–1.03)
URINALYSIS CLARITY, POC: CLEAR
URINALYSIS COLOR, POC: YELLOW
UROBILINOGEN, POC: NORMAL

## 2024-06-20 PROCEDURE — 81003 URINALYSIS AUTO W/O SCOPE: CPT | Performed by: INTERNAL MEDICINE

## 2024-07-09 ENCOUNTER — TELEPHONE (OUTPATIENT)
Facility: HOSPITAL | Age: 89
End: 2024-07-09

## 2024-07-09 NOTE — TELEPHONE ENCOUNTER
Patient cancelled appt. on 7/8/24 at 9:41am due to the patient stating she is sick & she is not able to make her appt. today. She wcb to r/s.

## 2024-07-16 ENCOUNTER — HOSPITAL ENCOUNTER (OUTPATIENT)
Facility: HOSPITAL | Age: 89
Setting detail: RECURRING SERIES
Discharge: HOME | End: 2024-07-19
Payer: MEDICARE

## 2024-07-16 PROCEDURE — 97161 PT EVAL LOW COMPLEX 20 MIN: CPT

## 2024-07-16 PROCEDURE — 97535 SELF CARE MNGMENT TRAINING: CPT

## 2024-07-16 PROCEDURE — 97110 THERAPEUTIC EXERCISES: CPT

## 2024-07-16 NOTE — THERAPY EVALUATION
combination of the followin Therapeutic Exercise, 23885 Neuromuscular Re-Education, 45554 Manual Therapy, 34777 Therapeutic Activity, 98194 Self Care/Home Management, 09195 Electrical Stim unattended, 64290 Electrical Stim attended, 96556 Ultrasound, and 46810 Mechanical Traction  Patient / Family readiness to learn indicated by: asking questions, trying to perform skills, interest, return verbalization , and return demonstration   Persons(s) to be included in education: patient (P)  Barriers to Learning/Limitations: none  Measures taken if barriers to learning present: NA  Patient Goal (s): : for this pain to improve and I won't be so stiff   Patient Self Reported Health Status: NA today  Rehabilitation Potential: good    Short Term Goals: To be accomplished in 4 WEEKS  1 patient will have established and be I with HEP to aid with independence and self management at discharge  EVAL HEP issued at evaluation  2 patient will have pain 4/10 to aid with increase tolerance to ADLS and regular daily activities at home and in the community  EVAL 6    Long Term Goals: To be accomplished in 8 WEEKS  1 patient will have established and be I with HEP to aid with independence and self management at discharge  EVAL HEP issued at evaluation  2 patient will have pain 2/10 to aid with increase tolerance to ADLS and regular daily activities at home and in the community  EVAL 6  3 patient will have OSW 33% to show projected and significant gains with increase tolerance to ADLS and activities at home ie vacuuming  EVAL 46%  4 patient will report ability to tolerate making the bed with little difficulty due to overall less pain  EVAL making the bed makes her pain worse.       Frequency / Duration: Patient to be seen 2 times per week for 8 WEEKS    Patient/ Caregiver education and instruction: Diagnosis, prognosis, self care, activity modification, brace/ splint application, and exercises [x]  Plan of care has been reviewed with 
Flex  [] Ext    Sacroilliac:  Gaenslen's: [] R    [] L    [] +    [] -     Compression: [] +    [] -     Gapping:  [] +    [] -     Thigh Thrust: [] R    [] L    [] +    [] -     Leg Length: [] +    [] -   Position:    Crests:    ASIS:    PSIS:    Sacral Sulcus:    Mobility: Standing flex:     Sitting flex:     Supine to sit:     Prone knee bend:         Hip: Ishan:  [] R    [] L    [] +    [] -     Scour:  [] R    [] L    [] +    [] -     Piriformis: [x] R    [] L    [x] +    [] -          Deficits: Jeremie's: [] R    [] L    [] +    [] -     Ha: [] R    [] L    [] +    [] -     Hamstrings 90/90:    Gastrocsoleus (to neutral): Right: Left:       Global Muscular Weakness:  Abdominals: X  Quadratus Lumborum:  Paraspinals:  Other:    Other tests/comments: Sit to stands 5x with UE  22 seconds    TUG test 17 seconds      IMPRESSION:  Straightening of the expected lumbar lordosis with multilevel grade 1  spondylolisthesis.     Multilevel broad-based disc osteophyte complex with asymmetric left subarticular  recess narrowing at L3-L4 with contact/impingement of the left L4 nerve root.     Multilevel mild/moderate foraminal and central canal narrowing.     Please refer to the body of the report for comprehensive segmental analysis of  the lumbar spinal column.           Specimen Collected: 05/25/24 10:31 EDT Last Resulted: 05/25/24 10:51 EDT               ASSESSMENT/Changes in Function: Patient demonstrates the potential to make gains with improved ROM, strength, endurance/activity tolerance, functional OSW survey score   and all within a reasonable time frame so as to increase their functional independence with ADLs and activities for carryover to  improved quality of life, tolerance to household and community activities. Patient requires skilled Physical Therapy so as to monitor their response to and modify their treatment plan accordingly. Patient appears to be an appropriate candidate for skilled outpatient Physical

## 2024-07-18 ENCOUNTER — HOSPITAL ENCOUNTER (OUTPATIENT)
Facility: HOSPITAL | Age: 89
Setting detail: RECURRING SERIES
Discharge: HOME | End: 2024-07-21
Payer: MEDICARE

## 2024-07-18 PROCEDURE — 97012 MECHANICAL TRACTION THERAPY: CPT

## 2024-07-18 PROCEDURE — 97530 THERAPEUTIC ACTIVITIES: CPT

## 2024-07-18 PROCEDURE — 97110 THERAPEUTIC EXERCISES: CPT

## 2024-07-18 NOTE — PROGRESS NOTES
Term Goals: To be accomplished in 8 WEEKS  1 patient will have established and be I with HEP to aid with independence and self management at discharge  EVAL HEP issued at evaluation  CURRENT Reports initial compliance 7/18/24   2 patient will have pain 2/10 to aid with increase tolerance to ADLS and regular daily activities at home and in the community  EVAL 6  CURRENT 0 at arrival 7/18/24  3 patient will have OSW 33% to show projected and significant gains with increase tolerance to ADLS and activities at home ie vacuuming  EVAL 46%  CURRENT NA 7/18/24  4 patient will report ability to tolerate making the bed with little difficulty due to overall less pain  EVAL making the bed makes her pain worse.    CURRENT NA 7/18/24    Next PN/ RC due PN due 8/16/24  Auth due (visit number/ date) PRESLEY    PLAN  - Continue Plan of Care  - Upgrade activities as tolerated    Renita Velarde, PT    7/18/2024    9:02 AM    Future Appointments   Date Time Provider Department Center   7/18/2024  9:30 AM Becky Velardeerie, PT [facility]   7/23/2024 11:30 AM Becky Velardeerie, PT [facility]   7/26/2024  8:50 AM Becky Velardeerie, PT [facility]   7/30/2024  8:50 AM Aung England, PTA [facility]   8/1/2024 10:10 AM Becky Velardeerie, PT [facility]   8/5/2024  9:30 AM VelardeBeckyRenita, PT [facility]   8/7/2024  9:30 AM Velarde Renita, PT [facility]   8/12/2024  9:30 AM Velarde Renita, PT [facility]   8/14/2024  9:30 AM Velarde, Renita, PT [facility]   8/19/2024  9:30 AM Velarde Renita, PT [facility]   8/21/2024  9:30 AM Velarde Renita, PT [facility]   8/26/2024  9:30 AM Velarde Renita, PT [facility]   8/28/2024  9:30 AM Velarde Renita, PT [facility]   10/22/2024  9:30 AM [facility] VISIT [facility]   10/29/2024 10:20 AM Angelina Vega MD [facility]

## 2024-07-23 ENCOUNTER — HOSPITAL ENCOUNTER (OUTPATIENT)
Facility: HOSPITAL | Age: 89
Setting detail: RECURRING SERIES
Discharge: HOME | End: 2024-07-26
Payer: MEDICARE

## 2024-07-23 PROCEDURE — 97530 THERAPEUTIC ACTIVITIES: CPT

## 2024-07-23 PROCEDURE — 97012 MECHANICAL TRACTION THERAPY: CPT

## 2024-07-23 PROCEDURE — 97110 THERAPEUTIC EXERCISES: CPT

## 2024-07-23 NOTE — PROGRESS NOTES
PHYSICAL / OCCUPATIONAL THERAPY - DAILY TREATMENT NOTE    Patient Name: Berkley Clarke    Date: 2024    : 1935  Insurance: Payor: MEDICARE / Plan: MEDICARE PART A AND B / Product Type: *No Product type* /      Patient  verified Yes     Visit #   Current / Total 3 16   Time   In / Out 1134 1225   Pain   In / Out 0 0   Subjective Functional Status/Changes: Notes she has had a UTI this week and is not hitting on all cylinders, states she has not been able to do as many of the exercises due to side effects from the antibiotics.     TREATMENT AREA =  Other low back pain [M54.59]     OBJECTIVE    Modalities Rationale:     decrease pain and increase tissue extensibility to improve patient's ability to progress to PLOF and address remaining functional goals.     min [] Estim Unattended, type/location:                                      []  w/ice    []  w/heat    min [] Estim Attended, type/location:                                     []  w/US     []  w/ice    []  w/heat    []  TENS insruct     15 min [x]  Mechanical Traction: type/lbs supine static 45 # max                   []  pro   [x]  sup   []  int   [x]  cont    []  before manual    []  after manual    min []  Ultrasound, settings/location:      min  unbill []  Ice     []  Heat    location/position:     min []  Paraffin,  details:     min []  Vasopneumatic Device, press/temp:     min []  Whirlpool / Fluido:    If using vaso (only need to measure limb vaso being performed on)      pre-treatment girth :       post-treatment girth :       measured at (landmark location) :      min []  Other:    Skin assessment post-treatment:   Intact      Therapeutic Procedures:    Tx Min Billable or 1:1 Min (if diff from Tx Min) Procedure, Rationale, Specifics   [id number] [phone] [id number] Therapeutic Exercise (timed):  increase ROM, strength, coordination, balance, and proprioception to improve patient's ability to progress to PLOF and address remaining functional goals. (see flow

## 2024-07-26 ENCOUNTER — HOSPITAL ENCOUNTER (OUTPATIENT)
Facility: HOSPITAL | Age: 89
Setting detail: RECURRING SERIES
Discharge: HOME | End: 2024-07-29
Payer: MEDICARE

## 2024-07-26 PROCEDURE — 97112 NEUROMUSCULAR REEDUCATION: CPT

## 2024-07-26 PROCEDURE — 97530 THERAPEUTIC ACTIVITIES: CPT

## 2024-07-26 PROCEDURE — 97012 MECHANICAL TRACTION THERAPY: CPT

## 2024-07-26 PROCEDURE — 97110 THERAPEUTIC EXERCISES: CPT

## 2024-07-26 NOTE — PROGRESS NOTES
and attain remaining goals.    Progress toward goals / Updated goals:  []  See Progress Note/Recertification  Short Term Goals: To be accomplished in 4 WEEKS  1 patient will have established and be I with HEP to aid with independence and self management at discharge  EVAL HEP issued at evaluation  CURRENT reports initial compliance  7/26/24        2 patient will have pain 4/10 to aid with increase tolerance to ADLS and regular daily activities at home and in the community  EVAL 6  CURRENT 3 at arrival 7/26/24     Long Term Goals: To be accomplished in 8 WEEKS  1 patient will have established and be I with HEP to aid with independence and self management at discharge  EVAL HEP issued at evaluation  CURRENT Reports initial compliance 7/26/24  2 patient will have pain 2/10 to aid with increase tolerance to ADLS and regular daily activities at home and in the community  EVAL 6  CURRENT 3 at arrival 7/26/24  3 patient will have OSW 33% to show projected and significant gains with increase tolerance to ADLS and activities at home ie vacuuming  EVAL 46%  CURRENT NA 7/26/24  4 patient will report ability to tolerate making the bed with little difficulty due to overall less pain  EVAL making the bed makes her pain worse.   CURRENT NA 7/26/24     Next PN/ RC due PN due 8/16/24  Auth due (visit number/ date) PRESLEY    PLAN  - Continue Plan of Care  - Upgrade activities as tolerated    Renita Velarde PT    7/26/2024    8:55 AM  If an interpreting service was utilized for treatment of this patient, the contents of this document represent the material reviewed with the patient via the .     Future Appointments   Date Time Provider Department Center   7/30/2024  8:50 AM Aung England PTA [facility]   8/1/2024 10:10 AM Renita Velarde PT [facility]   8/5/2024  9:30 AM Renita Velarde PT [facility]   8/7/2024  9:30 AM Renita Velarde PT [facility]   8/12/2024  9:30 AM Renita Velarde PT [facility]   8/14/2024  9:30

## 2024-07-30 ENCOUNTER — HOSPITAL ENCOUNTER (OUTPATIENT)
Facility: HOSPITAL | Age: 89
Setting detail: RECURRING SERIES
Discharge: HOME | End: 2024-08-02
Payer: MEDICARE

## 2024-07-30 PROCEDURE — 97012 MECHANICAL TRACTION THERAPY: CPT

## 2024-07-30 PROCEDURE — 97112 NEUROMUSCULAR REEDUCATION: CPT

## 2024-07-30 PROCEDURE — 97110 THERAPEUTIC EXERCISES: CPT

## 2024-07-30 PROCEDURE — 97530 THERAPEUTIC ACTIVITIES: CPT

## 2024-07-30 NOTE — PROGRESS NOTES
PHYSICAL / OCCUPATIONAL THERAPY - DAILY TREATMENT NOTE    Patient Name: Berkley Clarke    Date: 2024    : 1935  Insurance: Payor: MEDICARE / Plan: MEDICARE PART A AND B / Product Type: *No Product type* /      Patient  verified Yes     Visit #   Current / Total 5 16   Time   In / Out 848 948   Pain   In / Out 0 0   Subjective Functional Status/Changes: States that she is not having any pain at the moment (at arrival)     TREATMENT AREA =  Other low back pain [M54.59]     OBJECTIVE    Modalities Rationale:     decrease pain and increase tissue extensibility to improve patient's ability to progress to PLOF and address remaining functional goals.     min [] Estim Unattended, type/location:                                      []  w/ice    []  w/heat    min [] Estim Attended, type/location:                                     []  w/US     []  w/ice    []  w/heat    []  TENS insruct     15 min [x]  Mechanical Traction: type/lbs      50                   []  pro   [x]  sup   []  int   [x]  cont    []  before manual    []  after manual    min []  Ultrasound, settings/location:      min  unbill []  Ice     []  Heat    location/position:     min []  Paraffin,  details:     min []  Vasopneumatic Device, press/temp:     min []  Whirlpool / Fluido:    If using vaso (only need to measure limb vaso being performed on)      pre-treatment girth :       post-treatment girth :       measured at (landmark location) :      min []  Other:    Skin assessment post-treatment:   Intact      Therapeutic Procedures:    Tx Min Billable or 1:1 Min (if diff from Tx Min) Procedure, Rationale, Specifics   [phone] [id number] Therapeutic Activity (timed):  use of dynamic activities replicating functional movements to increase ROM, strength, coordination, balance, and proprioception in order to improve patient's ability to progress to PLOF and address remaining functional goals.  (see flow sheet as applicable)     Details if applicable:

## 2024-08-01 ENCOUNTER — HOSPITAL ENCOUNTER (OUTPATIENT)
Facility: HOSPITAL | Age: 89
Setting detail: RECURRING SERIES
Discharge: HOME | End: 2024-08-04
Payer: MEDICARE

## 2024-08-01 PROCEDURE — 97110 THERAPEUTIC EXERCISES: CPT

## 2024-08-01 PROCEDURE — 97530 THERAPEUTIC ACTIVITIES: CPT

## 2024-08-01 PROCEDURE — 97112 NEUROMUSCULAR REEDUCATION: CPT

## 2024-08-01 PROCEDURE — 97012 MECHANICAL TRACTION THERAPY: CPT

## 2024-08-01 NOTE — PROGRESS NOTES
PHYSICAL / OCCUPATIONAL THERAPY - DAILY TREATMENT NOTE    Patient Name: Berkley Clarke    Date: 2024    : 1935  Insurance: Payor: MEDICARE / Plan: MEDICARE PART A AND B / Product Type: *No Product type* /      Patient  verified Yes     Visit #   Current / Total 6 16   Time   In / Out 1010 1103   Pain   In / Out 0 0   Subjective Functional Status/Changes: Reports that she is doing great today.      TREATMENT AREA =  Other low back pain [M54.59]     OBJECTIVE    Modalities Rationale:     increase tissue extensibility to improve patient's ability to progress to PLOF and address remaining functional goals.     min [] Estim Unattended, type/location:                                      []  w/ice    []  w/heat    min [] Estim Attended, type/location:                                     []  w/US     []  w/ice    []  w/heat    []  TENS insruct     15 min [x]  Mechanical Traction: type/lbs 50 # max                  []  pro   [x]  sup   []  int   [x]  cont    []  before manual    []  after manual    min []  Ultrasound, settings/location:      min  unbill []  Ice     []  Heat    location/position:     min []  Paraffin,  details:     min []  Vasopneumatic Device, press/temp:     min []  Whirlpool / Fluido:    If using vaso (only need to measure limb vaso being performed on)      pre-treatment girth :       post-treatment girth :       measured at (landmark location) :      min []  Other:    Skin assessment post-treatment:   Intact      Therapeutic Procedures:    Tx Min Billable or 1:1 Min (if diff from Tx Min) Procedure, Rationale, Specifics    84782 Therapeutic Exercise (timed):  increase ROM, strength, coordination, balance, and proprioception to improve patient's ability to progress to PLOF and address remaining functional goals. (see flow sheet as applicable)     Details if applicable:        81785 Therapeutic Activity (timed):  use of dynamic activities replicating functional movements to increase

## 2024-08-05 ENCOUNTER — HOSPITAL ENCOUNTER (OUTPATIENT)
Facility: HOSPITAL | Age: 89
Setting detail: RECURRING SERIES
Discharge: HOME | End: 2024-08-08
Payer: MEDICARE

## 2024-08-05 PROCEDURE — G0283 ELEC STIM OTHER THAN WOUND: HCPCS

## 2024-08-05 PROCEDURE — 97530 THERAPEUTIC ACTIVITIES: CPT

## 2024-08-05 PROCEDURE — 97110 THERAPEUTIC EXERCISES: CPT

## 2024-08-05 NOTE — PROGRESS NOTES
PHYSICAL / OCCUPATIONAL THERAPY - DAILY TREATMENT NOTE    Patient Name: Berkley Clarke    Date: 2024    : 1935  Insurance: Payor: MEDICARE / Plan: MEDICARE PART A AND B / Product Type: *No Product type* /      Patient  verified Yes     Visit #   Current / Total 7 16   Time   In / Out 930 1020   Pain   In / Out 6 0 \"Like a million bucks!\"   Subjective Functional Status/Changes: I am doing fair. I took 3 motrins this morning and they have not kicked in yet.      TREATMENT AREA =  Other low back pain [M54.59]     OBJECTIVE    Modalities Rationale:     decrease pain and increase tissue extensibility to improve patient's ability to progress to PLOF and address remaining functional goals.    15 min [x] Estim Unattended, type/location:  IFC Reclined B LS with B LE wedge                                     [x]  w/ice    []  w/heat    min [] Estim Attended, type/location:                                     []  w/US     []  w/ice    []  w/heat    []  TENS insruct      min []  Mechanical Traction: type/lbs                   []  pro   []  sup   []  int   []  cont    []  before manual    []  after manual    min []  Ultrasound, settings/location:      min  unbill []  Ice     []  Heat    location/position:     min []  Paraffin,  details:     min []  Vasopneumatic Device, press/temp:     min []  Whirlpool / Fluido:    If using vaso (only need to measure limb vaso being performed on)      pre-treatment girth :       post-treatment girth :       measured at (landmark location) :      min []  Other:    Skin assessment post-treatment:   Intact      Therapeutic Procedures:    Tx Min Billable or 1:1 Min (if diff from Tx Min) Procedure, Rationale, Specifics   95 78 [id number] Therapeutic Exercise (timed):  increase ROM, strength, coordination, balance, and proprioception to improve patient's ability to progress to PLOF and address remaining functional goals. (see flow sheet as applicable)     Details if applicable:       8 8

## 2024-08-07 ENCOUNTER — TELEPHONE (OUTPATIENT)
Facility: HOSPITAL | Age: 89
End: 2024-08-07

## 2024-08-07 ENCOUNTER — HOSPITAL ENCOUNTER (OUTPATIENT)
Facility: HOSPITAL | Age: 89
Setting detail: SPECIMEN
Discharge: HOME | End: 2024-08-10
Payer: MEDICARE

## 2024-08-07 ENCOUNTER — OFFICE VISIT (OUTPATIENT)
Facility: CLINIC | Age: 89
End: 2024-08-07
Payer: MEDICARE

## 2024-08-07 ENCOUNTER — APPOINTMENT (OUTPATIENT)
Facility: HOSPITAL | Age: 89
End: 2024-08-07
Payer: MEDICARE

## 2024-08-07 VITALS
SYSTOLIC BLOOD PRESSURE: 112 MMHG | HEIGHT: 67 IN | HEART RATE: 68 BPM | DIASTOLIC BLOOD PRESSURE: 69 MMHG | TEMPERATURE: 98.6 F | OXYGEN SATURATION: 94 % | RESPIRATION RATE: 17 BRPM | BODY MASS INDEX: 20.21 KG/M2 | WEIGHT: 128.8 LBS

## 2024-08-07 DIAGNOSIS — N39.0 RECURRENT UTI: ICD-10-CM

## 2024-08-07 DIAGNOSIS — N39.0 RECURRENT UTI: Primary | ICD-10-CM

## 2024-08-07 LAB
BILIRUBIN, URINE, POC: NEGATIVE
BLOOD URINE, POC: ABNORMAL
GLUCOSE URINE, POC: ABNORMAL
KETONES, URINE, POC: NEGATIVE
LEUKOCYTE ESTERASE, URINE, POC: ABNORMAL
PH, URINE, POC: 7 (ref 4.6–8)
PROTEIN,URINE, POC: NEGATIVE
SPECIFIC GRAVITY, URINE, POC: 1.01 (ref 1–1.03)
URINALYSIS CLARITY, POC: ABNORMAL
URINALYSIS COLOR, POC: ABNORMAL
UROBILINOGEN, POC: ABNORMAL

## 2024-08-07 PROCEDURE — 87086 URINE CULTURE/COLONY COUNT: CPT

## 2024-08-07 PROCEDURE — 81003 URINALYSIS AUTO W/O SCOPE: CPT | Performed by: NURSE PRACTITIONER

## 2024-08-07 PROCEDURE — G8428 CUR MEDS NOT DOCUMENT: HCPCS | Performed by: NURSE PRACTITIONER

## 2024-08-07 PROCEDURE — 1123F ACP DISCUSS/DSCN MKR DOCD: CPT | Performed by: NURSE PRACTITIONER

## 2024-08-07 PROCEDURE — 1090F PRES/ABSN URINE INCON ASSESS: CPT | Performed by: NURSE PRACTITIONER

## 2024-08-07 PROCEDURE — G8420 CALC BMI NORM PARAMETERS: HCPCS | Performed by: NURSE PRACTITIONER

## 2024-08-07 PROCEDURE — 99214 OFFICE O/P EST MOD 30 MIN: CPT | Performed by: NURSE PRACTITIONER

## 2024-08-07 PROCEDURE — 87088 URINE BACTERIA CULTURE: CPT

## 2024-08-07 PROCEDURE — 87186 SC STD MICRODIL/AGAR DIL: CPT

## 2024-08-07 PROCEDURE — 1036F TOBACCO NON-USER: CPT | Performed by: NURSE PRACTITIONER

## 2024-08-07 RX ORDER — GRANULES FOR ORAL 3 G/1
3 POWDER ORAL ONCE
Qty: 1 EACH | Refills: 0 | Status: SHIPPED | OUTPATIENT
Start: 2024-08-07 | End: 2024-08-07

## 2024-08-07 NOTE — ASSESSMENT & PLAN NOTE
The patient's symptoms and history suggest a recurrent bladder infection.     I reviewed her chart and it appears in the past she has had positive urine cultures with multi resistant bacteria.  Fosfomycin was prescribed in June 2024 by another provider and patient saw results.      Patient was recently on Keflex 500 mg twice daily for 10 days.  4 days after she completed Keflex her UTI symptoms have returned.    POC UA reveals 1+ blood, positive nitrates, 1+ leuk.      She reports that fosfomycin was effective in treating her previous infection, although it caused mild genital swelling and dizziness.     A urine culture will be sent for analysis. A prescription for fosfomycin [facility]. She is advised to continue her intake of water and cranberry juice.     She has seen uro/gyne in the past, 1 yr ago, and completed many tests but nothing came out of the visit. Pt is very frustrated and is agreeable in seeing another uro/gyne or consult Infectious Disease.     I question if she would benefit from vag cream ie estradiol.

## 2024-08-07 NOTE — TELEPHONE ENCOUNTER
Patient cancelled appt. on 8/7/24 at 7:37am due to the patient being sick and she is not able to make her appt. today.

## 2024-08-07 NOTE — PROGRESS NOTES
Berkley Clarke presents today for   Chief Complaint   Patient presents with    Other     Burning when urinating x 1 day    Fatigue     X 1 day           \"Have you been to the ER, urgent care clinic since your last visit?  Hospitalized since your last visit?\"    NO    “Have you seen or consulted any other health care providers outside of [facility] since your last visit?”    PT-2 days ago; back             
Other reaction(s): mild rash/itching      Sulfa Antibiotics Hives     Other reaction(s): mild rash/itching    Tetracycline Hives    Scopolamine Other (See Comments)     Other reaction(s): cardiac dysrhythmia  Patient reports A-Fib arrythmia    Formaldehyde Rash    Methenamine Hippurate Rash    Thallous Chloride Tl 201 Rash     Family History:   Family History   Problem Relation Age of Onset    Cancer Brother         gastric    Lung Cancer Brother     Cancer Mother         Gastric     Heart Disease Father     Heart Disease Sister      Social History:   She  reports that she has never smoked. She has never used smokeless tobacco.   Social History     Substance and Sexual Activity   Alcohol Use No     Immunization History:  Immunization History   Administered Date(s) Administered    Influenza, FLUAD, (age 65 y+), Adjuvanted, 0.5mL 10/19/2022, 10/24/2023    Influenza, High Dose (Fluzone 65 yrs and older) 10/25/2016, 11/06/2017, 10/26/2019, 10/13/2020    Influenza, Triv, inactivated, subunit, adjuvanted, IM (Fluad 65 yrs and older) 10/01/2018    Pneumococcal Vaccine 10/20/2004    Pneumococcal, PCV-13, PREVNAR 13, (age 6w+), IM, 0.5mL 12/02/2014    Pneumococcal, PPSV23, PNEUMOVAX 23, (age 2y+), SC/IM, 0.5mL 02/07/2006    TDaP, ADACEL (age 10y-64y), BOOSTRIX (age 10y+), IM, 0.5mL 08/25/2017    Zoster Live (Zostavax) 03/04/2008         Return if symptoms worsen or fail to improve.      Dr. Lucinda Petty, LYDIA-C, DNP  [facility]     The patient (or guardian, if applicable) and other individuals in attendance with the patient were advised that Artificial Intelligence will be utilized during this visit to record and process the conversation to generate a clinical note. The patient (or guardian, if applicable) and other individuals in attendance at the appointment consented to the use of AI, including the recording.

## 2024-08-09 LAB
BACTERIA SPEC CULT: ABNORMAL
CC UR VC: ABNORMAL
SERVICE CMNT-IMP: ABNORMAL

## 2024-08-12 ENCOUNTER — TELEPHONE (OUTPATIENT)
Facility: CLINIC | Age: 89
End: 2024-08-12

## 2024-08-12 ENCOUNTER — HOSPITAL ENCOUNTER (OUTPATIENT)
Facility: HOSPITAL | Age: 89
Setting detail: RECURRING SERIES
Discharge: HOME | End: 2024-08-15
Payer: MEDICARE

## 2024-08-12 PROCEDURE — 97530 THERAPEUTIC ACTIVITIES: CPT

## 2024-08-12 PROCEDURE — 97535 SELF CARE MNGMENT TRAINING: CPT

## 2024-08-12 PROCEDURE — 97110 THERAPEUTIC EXERCISES: CPT

## 2024-08-12 NOTE — PROGRESS NOTES
PHYSICAL / OCCUPATIONAL THERAPY - DAILY TREATMENT NOTE    Patient Name: Berkley Clarke    Date: 2024    : 1935  Insurance: Payor: MEDICARE / Plan: MEDICARE PART A AND B / Product Type: *No Product type* /      Patient  verified Yes     Visit #   Current / Total 8 16   Time   In / Out 933 1035   Pain   In / Out 3 0   Subjective Functional Status/Changes: I am doing alright today.      TREATMENT AREA =  Other low back pain [M54.59]     OBJECTIVE     Therapeutic Procedures:    Tx Min Billable or 1:1 Min (if diff from Tx Min) Procedure, Rationale, Specifics   24   24 [id number] Therapeutic Exercise (timed):  increase ROM, strength, coordination, balance, and proprioception to improve patient's ability to progress to PLOF and address remaining functional goals. (see flow sheet as applicable)     Details if applicable:        21191 Therapeutic Activity (timed):  use of dynamic activities replicating functional movements to increase ROM, strength, coordination, balance, and proprioception in order to improve patient's ability to progress to PLOF and address remaining functional goals.  (see flow sheet as applicable)     Details if applicable:      88476 Self Care/Home Management (timed):  improve patient knowledge and understanding of pain reducing techniques, positioning, posture/ergonomics, home safety, activity modification, and ESTIM TENS SET UP AND FITTING  to improve patient's ability to progress to PLOF and address remaining functional goals.  (see flow sheet as applicable)     Details if applicable:            Details if applicable:            Details if applicable:     62 62 [facility] Totals Reminder: bill using total billable min of TIMED therapeutic procedures (example: do not include dry needle or estim unattended, both untimed codes, in totals to left)  8-22 min = 1 unit; 23-37 min = 2 units; 38-52 min = 3 units; 53-67 min = 4 units; 68-82 min = 5 units   Total Total     [x]  Patient Education

## 2024-08-12 NOTE — TELEPHONE ENCOUNTER
Pt requesting call back    Pt stated she was told she would get a call back regarding her UTI medication and whether is is the correct medication    Pt requesting information regarding the referral

## 2024-08-13 ENCOUNTER — PATIENT MESSAGE (OUTPATIENT)
Facility: CLINIC | Age: 89
End: 2024-08-13

## 2024-08-14 ENCOUNTER — HOSPITAL ENCOUNTER (OUTPATIENT)
Facility: HOSPITAL | Age: 89
Setting detail: RECURRING SERIES
Discharge: HOME | End: 2024-08-17
Payer: MEDICARE

## 2024-08-14 ENCOUNTER — TELEPHONE (OUTPATIENT)
Facility: CLINIC | Age: 89
End: 2024-08-14

## 2024-08-14 DIAGNOSIS — N39.0 RECURRENT UTI: Primary | ICD-10-CM

## 2024-08-14 LAB
BILIRUBIN, URINE, POC: NEGATIVE
BLOOD URINE, POC: NEGATIVE
GLUCOSE URINE, POC: NEGATIVE
KETONES, URINE, POC: NEGATIVE
LEUKOCYTE ESTERASE, URINE, POC: NEGATIVE
NITRITE, URINE, POC: NEGATIVE
PH, URINE, POC: 5.5 (ref 4.6–8)
PROTEIN,URINE, POC: NEGATIVE
SPECIFIC GRAVITY, URINE, POC: 1.01 (ref 1–1.03)
URINALYSIS CLARITY, POC: CLEAR
URINALYSIS COLOR, POC: YELLOW
UROBILINOGEN, POC: NORMAL

## 2024-08-14 PROCEDURE — G0283 ELEC STIM OTHER THAN WOUND: HCPCS

## 2024-08-14 PROCEDURE — 97110 THERAPEUTIC EXERCISES: CPT

## 2024-08-14 PROCEDURE — 81003 URINALYSIS AUTO W/O SCOPE: CPT | Performed by: INTERNAL MEDICINE

## 2024-08-14 PROCEDURE — 97530 THERAPEUTIC ACTIVITIES: CPT

## 2024-08-14 PROCEDURE — 97535 SELF CARE MNGMENT TRAINING: CPT

## 2024-08-14 NOTE — TELEPHONE ENCOUNTER
----- Message from Krish Panda sent at 7/29/2021 10:33 AM EDT -----  Subject: Appointment Request    Reason for Call: Urgent Skin Problem    QUESTIONS  Type of Appointment? Established Patient  Reason for appointment request? No appointments available during search  Additional Information for Provider? PT has poison ivy on both arms, but   has a history of getting poison ivy badly and wanted to see if possible to   come in for a shot.   ---------------------------------------------------------------------------  --------------  CALL BACK INFO  What is the best way for the office to contact you? OK to leave message on   voicemail  Preferred Call Back Phone Number? [phone]  ---------------------------------------------------------------------------  --------------  SCRIPT ANSWERS  Relationship to Patient? Self  Are you having swelling in your throat or face? No  Are you having difficulty breathing? No  Have the symptoms worsened or spread in the last day? Yes  Have you been diagnosed with, awaiting test results for, or told that you   are suspected of having COVID-19 (Coronavirus)? (If patient has tested   negative or was tested as a requirement for work, school, or travel and   not based on symptoms, answer no)? No  Do you currently have flu-like symptoms including fever or chills, cough,   shortness of breath, difficulty breathing, or new loss of taste or smell? No  Have you had close contact with someone with COVID-19 in the last 14 days? No  (Service Expert  click yes below to proceed with Essie Herrera As Usual   Scheduling)?  Yes Patient dropped off urine today, see results in chart (dip).

## 2024-08-14 NOTE — THERAPY RECERTIFICATION
PHYSICAL / OCCUPATIONAL THERAPY - DAILY TREATMENT NOTE    Patient Name: Berkley Clarke    Date: 2024    : 1935  Insurance: Payor: MEDICARE / Plan: MEDICARE PART A AND B / Product Type: *No Product type* /      Patient  verified Yes     Visit #   Current / Total 9 16   Time   In / Out 933 1036   Pain   In / Out 4 0   Subjective Functional Status/Changes: It is about a 4 today.     TREATMENT AREA =  Other low back pain [M54.59]     OBJECTIVE    Modalities Rationale:     decrease pain and increase tissue extensibility to improve patient's ability to progress to PLOF and address remaining functional goals.    15 min [x] Estim Unattended, type/location:  Reclined B LE wedge, Premod low rate, high width                                    [x]  w/ice    []  w/heat    min [] Estim Attended, type/location:                                     []  w/US     []  w/ice    []  w/heat    []  TENS insruct      min []  Mechanical Traction: type/lbs                   []  pro   []  sup   []  int   []  cont    []  before manual    []  after manual    min []  Ultrasound, settings/location:      min  unbill []  Ice     []  Heat    location/position:     min []  Paraffin,  details:     min []  Vasopneumatic Device, press/temp:     min []  Whirlpool / Fluido:    If using vaso (only need to measure limb vaso being performed on)      pre-treatment girth :       post-treatment girth :       measured at (landmark location) :      min []  Other:    Skin assessment post-treatment:   Intact      Therapeutic Procedures:    Tx Min Billable or 1:1 Min (if diff from Tx Min) Procedure, Rationale, Specifics   20 20 37094 Therapeutic Exercise (timed):  increase ROM, strength, coordination, balance, and proprioception to improve patient's ability to progress to PLOF and address remaining functional goals. (see flow sheet as applicable)     Details if applicable:       18  77777 Therapeutic Activity (timed):  use of dynamic activities

## 2024-08-14 NOTE — TELEPHONE ENCOUNTER
Urine is normal, no UTI    I put in referral for a new urogynecologist, Dr. Amado Gama.  Could not find him in the system, see bottom of referral for phone number to call to get the fax information.  Give Mrs. Clarke the phone number so she can call a few days to make appointment

## 2024-08-14 NOTE — THERAPY RECERTIFICATION
[facility]  [address], [address], [place] [address]  Ph:[phone] Fx:[phone]  PHYSICAL THERAPY PROGRESS NOTE  Patient Name: Berkley Clarke : 1935   Treatment/Medical Diagnosis: Other low back pain [M54.59]   Referral Source: Marifer Osorio, PA     Date of Initial Visit: 24 Attended Visits: 9 Missed Visits: 1     SUMMARY OF TREATMENT  Patient seen for eval and 8 follow up sessions. She has tolerated sessions well and has been set up with home estim TENS unit to aid with pain management with activities at home and in the community. She would benefit from LSO as well due to back pain and decrease tolerance to her ADLS and activities ie house cleaning and cooking.         CURRENT STATUS      Short Term Goals: To be accomplished in 4 WEEKS  1 patient will have established and be I with HEP to aid with independence and self management at discharge  EVAL HEP issued at evaluation  CURRENT reports compliance as able due to having UTI 24       2 patient will have pain 4/10 to aid with increase tolerance to ADLS and regular daily activities at home and in the community  EVAL 6  CURRENT 4  at arrival 24     Long Term Goals: To be accomplished in 8 WEEKS  1 patient will have established and be I with HEP to aid with independence and self management at discharge  EVAL HEP issued at evaluation  CURRENT Reports  compliance as able due to UTI 24  2 patient will have pain 2/10 to aid with increase tolerance to ADLS and regular daily activities at home and in the community  EVAL 6  CURRENT 4 at arrival 24  3 patient will have OSW 33% to show projected and significant gains with increase tolerance to ADLS and activities at home ie vacuuming  EVAL 46%  CURRENT 26% 24  4 patient will report ability to tolerate making the bed with little difficulty due to overall less pain  EVAL making the bed makes her pain worse.   CURRENT NA

## 2024-08-16 ENCOUNTER — HOSPITAL ENCOUNTER (OUTPATIENT)
Facility: HOSPITAL | Age: 89
End: 2024-08-16
Payer: MEDICARE

## 2024-08-16 ENCOUNTER — TELEPHONE (OUTPATIENT)
Facility: CLINIC | Age: 89
End: 2024-08-16

## 2024-08-16 DIAGNOSIS — R82.81 PYURIA: ICD-10-CM

## 2024-08-16 DIAGNOSIS — N39.0 RECURRENT UTI: ICD-10-CM

## 2024-08-16 DIAGNOSIS — R30.0 DYSURIA: Primary | ICD-10-CM

## 2024-08-16 DIAGNOSIS — N39.0 RECURRENT UTI: Primary | ICD-10-CM

## 2024-08-16 DIAGNOSIS — R30.0 DYSURIA: ICD-10-CM

## 2024-08-16 LAB
BILIRUBIN, URINE, POC: NEGATIVE
BLOOD URINE, POC: ABNORMAL
GLUCOSE URINE, POC: NEGATIVE
KETONES, URINE, POC: NEGATIVE
LEUKOCYTE ESTERASE, URINE, POC: ABNORMAL
NITRITE, URINE, POC: POSITIVE
PH, URINE, POC: 7 (ref 4.6–8)
PROTEIN,URINE, POC: NEGATIVE
SPECIFIC GRAVITY, URINE, POC: 1.01 (ref 1–1.03)
URINALYSIS CLARITY, POC: ABNORMAL
URINALYSIS COLOR, POC: ABNORMAL
UROBILINOGEN, POC: ABNORMAL

## 2024-08-16 PROCEDURE — 36415 COLL VENOUS BLD VENIPUNCTURE: CPT

## 2024-08-16 PROCEDURE — 81003 URINALYSIS AUTO W/O SCOPE: CPT | Performed by: INTERNAL MEDICINE

## 2024-08-16 PROCEDURE — 87088 URINE BACTERIA CULTURE: CPT

## 2024-08-16 PROCEDURE — 87186 SC STD MICRODIL/AGAR DIL: CPT

## 2024-08-16 PROCEDURE — 87086 URINE CULTURE/COLONY COUNT: CPT

## 2024-08-16 NOTE — PROGRESS NOTES
Order for urine culture is in there, please let her know that she needs to go to a lab at a hospital, let us know where she intends to go so we can send a urine culture order.  I am not going to treat her with antibiotics until we have the culture back since she is allergic to almost all antibiotics.  I will check the culture over the weekend and let her know when it is back

## 2024-08-16 NOTE — TELEPHONE ENCOUNTER
----- Message from Gildardo MARK sent at 8/16/2024  4:29 PM EDT -----  Regarding: Infectious Diease  Patient's daughter was asking if her mother had a referral to infectious diease.

## 2024-08-16 NOTE — TELEPHONE ENCOUNTER
Yes, the referral is in there now, please give them the office number so they can call for an appointment

## 2024-08-18 ENCOUNTER — TELEPHONE (OUTPATIENT)
Facility: CLINIC | Age: 89
End: 2024-08-18

## 2024-08-18 LAB
BACTERIA SPEC CULT: ABNORMAL
BACTERIA SPEC CULT: ABNORMAL
CC UR VC: ABNORMAL
SERVICE CMNT-IMP: ABNORMAL

## 2024-08-19 ENCOUNTER — HOSPITAL ENCOUNTER (OUTPATIENT)
Facility: HOSPITAL | Age: 89
Setting detail: RECURRING SERIES
Discharge: HOME | End: 2024-08-22
Payer: MEDICARE

## 2024-08-19 PROCEDURE — 97110 THERAPEUTIC EXERCISES: CPT

## 2024-08-19 PROCEDURE — G0283 ELEC STIM OTHER THAN WOUND: HCPCS

## 2024-08-19 PROCEDURE — 97530 THERAPEUTIC ACTIVITIES: CPT

## 2024-08-19 NOTE — PROGRESS NOTES
will have established and be I with HEP to aid with independence and self management at discharge  EVAL HEP issued at evaluation  CURRENT reports compliance as able due to having UTI 8/19/24       2 patient will have pain 4/10 to aid with increase tolerance to ADLS and regular daily activities at home and in the community  EVAL 6  CURRENT 2 at arrival 8/19/24     Long Term Goals: To be accomplished in 8 WEEKS  1 patient will have established and be I with HEP to aid with independence and self management at discharge  EVAL HEP issued at evaluation  CURRENT Reports  compliance as able due to UTI 8/19/24  2 patient will have pain 2/10 to aid with increase tolerance to ADLS and regular daily activities at home and in the community  EVAL 6  CURRENT 2 at arrival 8/19/24  3 patient will have OSW 33% to show projected and significant gains with increase tolerance to ADLS and activities at home ie vacuuming  EVAL 46%  CURRENT 26% 8/14/24     NA 8/19/24  4 patient will report ability to tolerate making the bed with little difficulty due to overall less pain  EVAL making the bed makes her pain worse.   CURRENT NA 8/19/24     Next PN/ RC due       RC due 9/14/24  Auth due (visit number/ date) PRESLEY       PLAN  - Continue Plan of Care  - Upgrade activities as tolerated    Renita Velarde PT    8/19/2024    9:35 AM  If an interpreting service was utilized for treatment of this patient, the contents of this document represent the material reviewed with the patient via the .     Future Appointments   Date Time Provider Department Center   8/21/2024  9:30 AM Renita Velarde PT [facility]   8/26/2024  9:30 AM Renita Velarde PT [facility]   8/28/2024  9:30 AM Renita Velarde PT [facility]   10/22/2024  9:30 AM [facility] LAB VISIT [facility]   10/29/2024 10:20 AM Angelina Vega MD [facility]

## 2024-08-21 ENCOUNTER — HOSPITAL ENCOUNTER (OUTPATIENT)
Facility: HOSPITAL | Age: 89
Setting detail: RECURRING SERIES
Discharge: HOME | End: 2024-08-24
Payer: MEDICARE

## 2024-08-21 PROCEDURE — 97140 MANUAL THERAPY 1/> REGIONS: CPT

## 2024-08-21 PROCEDURE — 97110 THERAPEUTIC EXERCISES: CPT

## 2024-08-21 PROCEDURE — 97530 THERAPEUTIC ACTIVITIES: CPT

## 2024-08-21 PROCEDURE — G0283 ELEC STIM OTHER THAN WOUND: HCPCS

## 2024-08-21 NOTE — PROGRESS NOTES
from skilled PT / OT services to modify and progress therapeutic interventions, analyze and address ROM deficits, analyze and address strength deficits, analyze and address soft tissue restrictions, analyze and cue for proper movement patterns, analyze and modify for postural abnormalities, and instruct in home and community integration to address functional deficits and attain remaining goals.    Progress toward goals / Updated goals:  []  See Progress Note/Recertification  Short Term Goals: To be accomplished in 4 WEEKS  1 patient will have established and be I with HEP to aid with independence and self management at discharge  EVAL HEP issued at evaluation  CURRENT reports compliance as able due to having UTI 8/21/24       2 patient will have pain 4/10 to aid with increase tolerance to ADLS and regular daily activities at home and in the community  EVAL 6  CURRENT 0 at arrival 8/21/24     Long Term Goals: To be accomplished in 8 WEEKS  1 patient will have established and be I with HEP to aid with independence and self management at discharge  EVAL HEP issued at evaluation  CURRENT Reports  compliance as able due to UTI 8/21/24  2 patient will have pain 2/10 to aid with increase tolerance to ADLS and regular daily activities at home and in the community  EVAL 6  CURRENT 0 at arrival 8/21/24  3 patient will have OSW 33% to show projected and significant gains with increase tolerance to ADLS and activities at home ie vacuuming  EVAL 46%  CURRENT 26% 8/14/24     NA 8/21/24  4 patient will report ability to tolerate making the bed with little difficulty due to overall less pain  EVAL making the bed makes her pain worse.   CURRENT NA 8/21/24     Next PN/ RC due       RC due 9/14/24  Auth due (visit number/ date) PRESLEY     PLAN  - Continue Plan of Care  - Upgrade activities as tolerated    Renita Velarde, PT    8/21/2024    9:45 AM  If an interpreting service was utilized for treatment of this patient, the contents of

## 2024-08-26 ENCOUNTER — HOSPITAL ENCOUNTER (OUTPATIENT)
Facility: HOSPITAL | Age: 89
Setting detail: RECURRING SERIES
Discharge: HOME | End: 2024-08-29
Payer: MEDICARE

## 2024-08-26 PROCEDURE — G0283 ELEC STIM OTHER THAN WOUND: HCPCS

## 2024-08-26 PROCEDURE — 97110 THERAPEUTIC EXERCISES: CPT

## 2024-08-26 PROCEDURE — 97530 THERAPEUTIC ACTIVITIES: CPT

## 2024-08-26 PROCEDURE — 97140 MANUAL THERAPY 1/> REGIONS: CPT

## 2024-08-26 NOTE — PROGRESS NOTES
PHYSICAL / OCCUPATIONAL THERAPY - DAILY TREATMENT NOTE    Patient Name: Berkley Clarke    Date: 2024    : 1935  Insurance: Payor: MEDICARE / Plan: MEDICARE PART A AND B / Product Type: *No Product type* /      Patient  verified Yes     Visit #   Current / Total 12 16   Time   In / Out 935 1030   Pain   In / Out 0 0   Subjective Functional Status/Changes: I am not having any pain today.      TREATMENT AREA =  Other low back pain [M54.59]     OBJECTIVE    Modalities Rationale:     decrease pain and increase tissue extensibility to improve patient's ability to progress to PLOF and address remaining functional goals.    15 min [x] Estim Unattended, type/location:IFC reclined with B LE wedge, to B LS region                                      []  w/ice    [x]  w/heat    min [] Estim Attended, type/location:                                     []  w/US     []  w/ice    []  w/heat    []  TENS insruct      min []  Mechanical Traction: type/lbs                   []  pro   []  sup   []  int   []  cont    []  before manual    []  after manual    min []  Ultrasound, settings/location:      min  unbill []  Ice     []  Heat    location/position:     min []  Paraffin,  details:     min []  Vasopneumatic Device, press/temp:     min []  Whirlpool / Fluido:    If using vaso (only need to measure limb vaso being performed on)      pre-treatment girth :       post-treatment girth :       measured at (landmark location) :      min []  Other:    Skin assessment post-treatment:   Redness, no adverse reactions      Therapeutic Procedures:    Tx Min Billable or 1:1 Min (if diff from Tx Min) Procedure, Rationale, Specifics    Therapeutic Exercise (timed):  increase ROM, strength, coordination, balance, and proprioception to improve patient's ability to progress to PLOF and address remaining functional goals. (see flow sheet as applicable)     Details if applicable:        Therapeutic Activity (timed):  use

## 2024-08-28 ENCOUNTER — HOSPITAL ENCOUNTER (OUTPATIENT)
Facility: HOSPITAL | Age: 89
Setting detail: RECURRING SERIES
Discharge: HOME | End: 2024-08-31
Payer: MEDICARE

## 2024-08-28 PROCEDURE — G0283 ELEC STIM OTHER THAN WOUND: HCPCS

## 2024-08-28 PROCEDURE — 97140 MANUAL THERAPY 1/> REGIONS: CPT

## 2024-08-28 PROCEDURE — 97110 THERAPEUTIC EXERCISES: CPT

## 2024-08-28 PROCEDURE — 97535 SELF CARE MNGMENT TRAINING: CPT

## 2024-08-28 PROCEDURE — 97530 THERAPEUTIC ACTIVITIES: CPT

## 2024-08-28 NOTE — PROGRESS NOTES
PHYSICAL / OCCUPATIONAL THERAPY - DAILY TREATMENT NOTE    Patient Name: Berkley Clarke    Date: 2024    : 1935  Insurance: Payor: MEDICARE / Plan: MEDICARE PART A AND B / Product Type: *No Product type* /      Patient  verified Yes     Visit #   Current / Total 13 16   Time   In / Out 933 1043   Pain   In / Out 0 0   Subjective Functional Status/Changes:  Reports she tried to adjust settings on TENS for better pain management and did not notice any difference.      TREATMENT AREA =  Other low back pain [M54.59]     OBJECTIVE    Modalities Rationale:     decrease pain and increase tissue extensibility to improve patient's ability to progress to PLOF and address remaining functional goals.    15 min [x] Estim Unattended, type/location:  IFC B LS in reclined with B LE wedge                                    []  w/ice    [x]  w/heat    min [] Estim Attended, type/location:                                     []  w/US     []  w/ice    []  w/heat    []  TENS insruct      min []  Mechanical Traction: type/lbs                   []  pro   []  sup   []  int   []  cont    []  before manual    []  after manual    min []  Ultrasound, settings/location:      min  unbill []  Ice     []  Heat    location/position:     min []  Paraffin,  details:     min []  Vasopneumatic Device, press/temp:     min []  Whirlpool / Fluido:    If using vaso (only need to measure limb vaso being performed on)      pre-treatment girth :       post-treatment girth :       measured at (landmark location) :      min []  Other:    Skin assessment post-treatment:   Redness, no adverse reactions      Therapeutic Procedures:    Tx Min Billable or 1:1 Min (if diff from Tx Min) Procedure, Rationale, Specifics   [phone] [id number] Therapeutic Exercise (timed):  increase ROM, strength, coordination, balance, and proprioception to improve patient's ability to progress to PLOF and address remaining functional goals. (see flow sheet as applicable)  functional mobility deficits, analyze and address strength deficits, analyze and address soft tissue restrictions, analyze and cue for proper movement patterns, analyze and modify for postural abnormalities, and instruct in home and community integration to address functional deficits and attain remaining goals.    Progress toward goals / Updated goals:  []  See Progress Note/Recertification  Short Term Goals: To be accomplished in 4 WEEKS  1 patient will have established and be I with HEP to aid with independence and self management at discharge  EVAL HEP issued at evaluation  CURRENT reports compliance as able  8/28/24       2 patient will have pain 4/10 to aid with increase tolerance to ADLS and regular daily activities at home and in the community  EVAL 6  CURRENT 0 at arrival 8/28/24     Long Term Goals: To be accomplished in 8 WEEKS  1 patient will have established and be I with HEP to aid with independence and self management at discharge  EVAL HEP issued at evaluation  CURRENT Reports  compliance as able  8/28/24  2 patient will have pain 2/10 to aid with increase tolerance to ADLS and regular daily activities at home and in the community  EVAL 6  CURRENT 0 at arrival 8/28/24  3 patient will have OSW 33% to show projected and significant gains with increase tolerance to ADLS and activities at home ie vacuuming  EVAL 46%  CURRENT 26% 8/14/24     NA 8/28/24  4 patient will report ability to tolerate making the bed with little difficulty due to overall less pain  EVAL making the bed makes her pain worse.   CURRENT  reports that making the bed is getting easier than it was, she still has difficulty with the fitted sheets and getting them on the corners.  8/28/24     Next PN/ RC due       RC due 9/14/24  Auth due (visit number/ date) PRESLEY    PLAN  - Continue Plan of Care  - Upgrade activities as tolerated    Renita Velarde, PT    8/28/2024    9:59 AM  If an interpreting service was utilized for treatment of this

## 2024-09-04 ENCOUNTER — HOSPITAL ENCOUNTER (OUTPATIENT)
Facility: HOSPITAL | Age: 89
Setting detail: RECURRING SERIES
Discharge: HOME | End: 2024-09-07
Payer: MEDICARE

## 2024-09-04 PROCEDURE — 97140 MANUAL THERAPY 1/> REGIONS: CPT

## 2024-09-04 PROCEDURE — 97530 THERAPEUTIC ACTIVITIES: CPT

## 2024-09-04 PROCEDURE — 97535 SELF CARE MNGMENT TRAINING: CPT

## 2024-09-04 PROCEDURE — 97110 THERAPEUTIC EXERCISES: CPT

## 2024-09-04 PROCEDURE — G0283 ELEC STIM OTHER THAN WOUND: HCPCS

## 2024-09-04 NOTE — PROGRESS NOTES
PHYSICAL / OCCUPATIONAL THERAPY - DAILY TREATMENT NOTE    Patient Name: Berkley Clarke    Date: 2024    : 1935  Insurance: Payor: MEDICARE / Plan: MEDICARE PART A AND B / Product Type: *No Product type* /      Patient  verified Yes     Visit #   Current / Total 14 16   Time   In / Out 1015 1123   Pain   In / Out 0 Looser, relaxed 0   Subjective Functional Status/Changes: Denies pain at arrival, is looking forward to receiving new LSO once approved by MD.     TREATMENT AREA =  Other low back pain [M54.59]     OBJECTIVE    Modalities Rationale:     decrease pain and increase tissue extensibility to improve patient's ability to progress to PLOF and address remaining functional goals.    15 min [x] Estim Unattended, type/location:  IFC B LS with B LE wedge                                    []  w/ice    [x]  w/heat    min [] Estim Attended, type/location:                                     []  w/US     []  w/ice    []  w/heat    []  TENS insruct      min []  Mechanical Traction: type/lbs                   []  pro   []  sup   []  int   []  cont    []  before manual    []  after manual    min []  Ultrasound, settings/location:      min  unbill []  Ice     []  Heat    location/position:     min []  Paraffin,  details:     min []  Vasopneumatic Device, press/temp:     min []  Whirlpool / Fluido:    If using vaso (only need to measure limb vaso being performed on)      pre-treatment girth :       post-treatment girth :       measured at (landmark location) :      min []  Other:    Skin assessment post-treatment:   Intact      Therapeutic Procedures:    Tx Min Billable or 1:1 Min (if diff from Tx Min) Procedure, Rationale, Specifics   [phone] [id number] Therapeutic Exercise (timed):  increase ROM, strength, coordination, balance, and proprioception to improve patient's ability to progress to PLOF and address remaining functional goals. (see flow sheet as applicable)     Details if applicable:       [phone] Self

## 2024-09-06 ENCOUNTER — APPOINTMENT (OUTPATIENT)
Facility: HOSPITAL | Age: 89
End: 2024-09-06
Payer: MEDICARE

## 2024-09-10 ENCOUNTER — TELEPHONE (OUTPATIENT)
Facility: HOSPITAL | Age: 89
End: 2024-09-10

## 2024-09-10 ENCOUNTER — APPOINTMENT (OUTPATIENT)
Facility: HOSPITAL | Age: 89
End: 2024-09-10
Payer: MEDICARE

## 2024-09-19 ENCOUNTER — OFFICE VISIT (OUTPATIENT)
Facility: CLINIC | Age: 89
End: 2024-09-19

## 2024-09-19 ENCOUNTER — APPOINTMENT (OUTPATIENT)
Facility: HOSPITAL | Age: 89
End: 2024-09-19
Payer: MEDICARE

## 2024-09-19 VITALS
OXYGEN SATURATION: 96 % | BODY MASS INDEX: 19.46 KG/M2 | DIASTOLIC BLOOD PRESSURE: 64 MMHG | WEIGHT: 124 LBS | TEMPERATURE: 98.6 F | RESPIRATION RATE: 18 BRPM | HEIGHT: 67 IN | HEART RATE: 70 BPM | SYSTOLIC BLOOD PRESSURE: 121 MMHG

## 2024-09-19 DIAGNOSIS — R82.71 ASYMPTOMATIC BACTERIURIA: ICD-10-CM

## 2024-09-19 DIAGNOSIS — M54.50 CHRONIC LOW BACK PAIN, UNSPECIFIED BACK PAIN LATERALITY, UNSPECIFIED WHETHER SCIATICA PRESENT: ICD-10-CM

## 2024-09-19 DIAGNOSIS — G89.29 CHRONIC LOW BACK PAIN, UNSPECIFIED BACK PAIN LATERALITY, UNSPECIFIED WHETHER SCIATICA PRESENT: ICD-10-CM

## 2024-09-19 DIAGNOSIS — G45.9 TIA (TRANSIENT ISCHEMIC ATTACK): ICD-10-CM

## 2024-09-19 DIAGNOSIS — M79.89 LEFT LEG SWELLING: ICD-10-CM

## 2024-09-19 DIAGNOSIS — E78.5 DYSLIPIDEMIA, GOAL LDL BELOW 70: ICD-10-CM

## 2024-09-19 DIAGNOSIS — Z09 HOSPITAL DISCHARGE FOLLOW-UP: Primary | ICD-10-CM

## 2024-09-19 RX ORDER — ATORVASTATIN CALCIUM 40 MG/1
40 TABLET, FILM COATED ORAL DAILY
Qty: 90 TABLET | Refills: 1 | Status: SHIPPED | OUTPATIENT
Start: 2024-09-19

## 2024-09-19 RX ORDER — CIPROFLOXACIN 500 MG/1
500 TABLET, FILM COATED ORAL 2 TIMES DAILY
Qty: 14 TABLET | Refills: 0 | Status: SHIPPED | OUTPATIENT
Start: 2024-09-19 | End: 2024-09-26

## 2024-09-19 RX ORDER — CLOPIDOGREL BISULFATE 75 MG/1
75 TABLET ORAL DAILY
COMMUNITY
Start: 2024-09-16

## 2024-09-19 RX ORDER — FAMOTIDINE 20 MG/1
20 TABLET, FILM COATED ORAL 2 TIMES DAILY
COMMUNITY

## 2024-09-19 RX ORDER — ATORVASTATIN CALCIUM 40 MG/1
1 TABLET, FILM COATED ORAL
COMMUNITY
Start: 2024-09-16

## 2024-09-19 RX ORDER — LIDOCAINE 50 MG/G
PATCH TOPICAL
Qty: 30 PATCH | Refills: 5 | Status: SHIPPED | OUTPATIENT
Start: 2024-09-19

## 2024-09-19 ASSESSMENT — ENCOUNTER SYMPTOMS
BLOOD IN STOOL: 0
SHORTNESS OF BREATH: 0
BACK PAIN: 1

## 2024-09-19 ASSESSMENT — PATIENT HEALTH QUESTIONNAIRE - PHQ9
1. LITTLE INTEREST OR PLEASURE IN DOING THINGS: NOT AT ALL
SUM OF ALL RESPONSES TO PHQ9 QUESTIONS 1 & 2: 0
SUM OF ALL RESPONSES TO PHQ QUESTIONS 1-9: 0
SUM OF ALL RESPONSES TO PHQ QUESTIONS 1-9: 0
2. FEELING DOWN, DEPRESSED OR HOPELESS: NOT AT ALL
SUM OF ALL RESPONSES TO PHQ QUESTIONS 1-9: 0
SUM OF ALL RESPONSES TO PHQ QUESTIONS 1-9: 0

## 2024-09-20 ENCOUNTER — HOSPITAL ENCOUNTER (OUTPATIENT)
Facility: HOSPITAL | Age: 89
Discharge: HOME | End: 2024-09-22
Attending: INTERNAL MEDICINE
Payer: MEDICARE

## 2024-09-20 DIAGNOSIS — M79.89 LEFT LEG SWELLING: ICD-10-CM

## 2024-09-20 PROCEDURE — 93971 EXTREMITY STUDY: CPT

## 2024-09-21 ENCOUNTER — TELEPHONE (OUTPATIENT)
Facility: CLINIC | Age: 89
End: 2024-09-21

## 2024-09-21 PROCEDURE — 93971 EXTREMITY STUDY: CPT | Performed by: INTERNAL MEDICINE

## 2024-09-25 ENCOUNTER — TELEPHONE (OUTPATIENT)
Facility: CLINIC | Age: 89
End: 2024-09-25

## 2024-09-25 DIAGNOSIS — G45.9 TIA (TRANSIENT ISCHEMIC ATTACK): Primary | ICD-10-CM

## 2024-10-07 ENCOUNTER — TELEPHONE (OUTPATIENT)
Facility: CLINIC | Age: 89
End: 2024-10-07

## 2024-10-07 NOTE — TELEPHONE ENCOUNTER
The Holter has not come to me because I did not order it.    findout from her where she had it placed, then see if you can find the results.

## 2024-10-07 NOTE — TELEPHONE ENCOUNTER
Pt called in states she mailed in the cardiac monitor and wants to know if provider has rcvd the results yet? She mailed it in 7 days after 9/16/2024.     Please advise.     Refaxed cardiology referral to [phone]

## 2024-10-08 NOTE — TELEPHONE ENCOUNTER
Cardiac monitor results show overall normal rhythm, occasional fast beats last average 8 beats, and premature beats ( can feel like a flutter) 1 or 2% of the time-- none of these are dangerous or prolonged.  No evidence of atrial fibrillation (which can put at risk of stroke)

## 2024-10-14 ENCOUNTER — TELEPHONE (OUTPATIENT)
Facility: CLINIC | Age: 89
End: 2024-10-14

## 2024-10-14 DIAGNOSIS — E78.5 DYSLIPIDEMIA, GOAL LDL BELOW 70: ICD-10-CM

## 2024-10-14 RX ORDER — ATORVASTATIN CALCIUM 40 MG/1
40 TABLET, FILM COATED ORAL DAILY
Qty: 90 TABLET | Refills: 0 | Status: SHIPPED | OUTPATIENT
Start: 2024-10-14

## 2024-10-14 RX ORDER — ATORVASTATIN CALCIUM 40 MG/1
40 TABLET, FILM COATED ORAL DAILY
Qty: 90 TABLET | Refills: 1 | Status: CANCELLED | OUTPATIENT
Start: 2024-10-14

## 2024-10-14 NOTE — TELEPHONE ENCOUNTER
Pt requested a refill for atorvastatin (LIPITOR) 40 MG tablet to be sent to [facility] on [address]. Please advise.

## 2024-10-18 ENCOUNTER — PATIENT MESSAGE (OUTPATIENT)
Facility: CLINIC | Age: 89
End: 2024-10-18

## 2024-10-24 ENCOUNTER — TELEPHONE (OUTPATIENT)
Facility: CLINIC | Age: 89
End: 2024-10-24

## 2024-10-24 NOTE — TELEPHONE ENCOUNTER
Pt called in states she has not heard anything from [employer] medical equipment regarding the order for the beside commode.     Please advise.     Call back req

## 2024-10-29 ENCOUNTER — OFFICE VISIT (OUTPATIENT)
Facility: CLINIC | Age: 89
End: 2024-10-29

## 2024-10-29 VITALS
DIASTOLIC BLOOD PRESSURE: 82 MMHG | HEART RATE: 68 BPM | TEMPERATURE: 97.2 F | BODY MASS INDEX: 19.24 KG/M2 | HEIGHT: 67 IN | OXYGEN SATURATION: 98 % | WEIGHT: 122.6 LBS | SYSTOLIC BLOOD PRESSURE: 116 MMHG

## 2024-10-29 DIAGNOSIS — I47.10 PAROXYSMAL SVT (SUPRAVENTRICULAR TACHYCARDIA) (HCC): ICD-10-CM

## 2024-10-29 DIAGNOSIS — E78.49 OTHER HYPERLIPIDEMIA: ICD-10-CM

## 2024-10-29 DIAGNOSIS — Z22.358 ESBL E. COLI CARRIER: ICD-10-CM

## 2024-10-29 DIAGNOSIS — N39.0 RECURRENT UTI: Primary | ICD-10-CM
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
## 2024-10-29 RX ORDER — ATORVASTATIN CALCIUM 40 MG/1
40 TABLET, FILM COATED ORAL DAILY
COMMUNITY

## 2024-10-29 NOTE — ASSESSMENT & PLAN NOTE
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 No current symptoms of infection. She has been seeing Dr. Hallman, a urogynecologist, who has has been treating them with Macrobid, based on sensitivity and navigating her medication allergies. Referring to an infectious disease specialist, Dr. Whitney Yu, for further evaluation and management.

## 2024-10-29 NOTE — ASSESSMENT & PLAN NOTE
Asymptomatic. She prefers to see Dr. Nunez at [facility] and has an appointment scheduled for May. No recent episodes of PSVT have been reported. This may have been an isolated incident related to post auricular placement of scopolamine patch during cataract extraction

## 2024-10-29 NOTE — PROGRESS NOTES
Berkley Clarke (: 1935) is a 89 y.o. 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 No current symptoms of infection. She has been seeing Dr. Hallman, a urogynecologist, who has has been treating them with Macrobid, based on sensitivity and navigating her medication allergies. Referring to an infectious disease specialist, Dr. Whitney Yu, for further evaluation and management.    Orders:  -     Amb External Referral To Infectious Disease  ESBL E. coli carrier  -     Amb External Referral To Infectious Disease  Paroxysmal SVT (supraventricular tachycardia) (HCC)  Assessment & Plan:  Asymptomatic. She prefers to see Dr. Nunez at [facility] and has an appointment scheduled for May. No recent episodes of PSVT have been reported. This may have been an isolated incident related to post auricular placement of scopolamine patch during cataract extraction  Orders:  -     Amb External Referral To Infectious Disease  Other hyperlipidemia  Assessment & Plan:  Statin initiated for suspected TIA left sided 24.    Orders:  -     Comprehensive Metabolic Panel; Future  -     CBC with Auto Differential; Future  -     Lipid Panel; Future        Follow-up and Dispositions    Return in about 6 weeks (around 12/10/2024) for chronic medical conditions, MWV same day, labs prior, (45).            SUBJECTIVE/OBJECTIVE:  Chief Complaint   Patient presents with    Establish Care     Patient here to establish care. She states she would like to discuss referrals for recurrent UTI's. She is not having any symptoms at this time. She does see Uro/Gyn with [facility] Dr. Keya Mason and has been told e-coli has colonized in her bladder.       History of Present Illness  The patient presents for a checkup. She is accompanied by her daughters: Laura via phone and

## 2024-10-29 NOTE — PROGRESS NOTES
Chief Complaint   Patient presents with    Establish Care     Patient here to establish care. She states she would like to discuss referrals for recurrent UTI's. She is not having any symptoms at this time. She does see Uro/Gyn with [facility] Dr. Keya Mason and has been told e-coli has colonized in her bladder.          \"Have you been to the ER, urgent care clinic since your last visit?  Hospitalized since your last visit?\"    NO    “Have you seen or consulted any other health care providers outside our system since your last visit?”    NO

## 2024-11-08 ENCOUNTER — TELEPHONE (OUTPATIENT)
Facility: CLINIC | Age: 89
End: 2024-11-08

## 2024-11-08 DIAGNOSIS — B37.9 ANTIBIOTIC-INDUCED YEAST INFECTION: Primary | ICD-10-CM

## 2024-11-08 DIAGNOSIS — T36.95XA ANTIBIOTIC-INDUCED YEAST INFECTION: Primary | ICD-10-CM

## 2024-11-08 RX ORDER — FLUCONAZOLE 150 MG/1
150 TABLET ORAL ONCE
Qty: 1 TABLET | Refills: 0 | Status: SHIPPED | OUTPATIENT
Start: 2024-11-08 | End: 2024-11-08

## 2024-11-08 NOTE — TELEPHONE ENCOUNTER
Called patient to let her know Dr. Bearden has sent in the Diflucan for her to [facility] no answer message has been left on her VM making her aware.

## 2024-11-08 NOTE — TELEPHONE ENCOUNTER
Called patient home number no answer at all telephone just rang. Called patients mobile number spoke with patient who was told Dr. Bearden would require an appointment for medication requests. Patient states she keeps the Diflucan on hand due to her recurrent UTI's that causes her some incontinence which causes her to get a yeast infection sometimes. She says Dr. Bearden has seen her for the UTI and referred her to an Infectious Disease specialists. Patient took her last Diflucan today and just wanted to know if Dr. Bearden could send this in for her since she has been seen for her UTI. Told patient I can send a message to Dr. Bearden and will call her back with her response. Please advise.

## 2024-11-08 NOTE — TELEPHONE ENCOUNTER
Pt called back stating that she would just like to request the medication DIFLUCAN. Informed pt that this may require an appt but the message would be routed to Chely for her to review.

## 2024-11-08 NOTE — TELEPHONE ENCOUNTER
Ordered. Going forward, I recommend that whenever she's prescribed antibiotics she ask for the fluconazole (Diflucan) at the same time.  ARPIT

## 2024-11-08 NOTE — TELEPHONE ENCOUNTER
Pt called to speak to Chely, informed pt that Chely was in patient care at the moment but could return a call at her soonest convenience. Please review and advise as needed.

## 2024-12-03 ENCOUNTER — HOSPITAL ENCOUNTER (OUTPATIENT)
Facility: HOSPITAL | Age: 89
Discharge: HOME | End: 2024-12-06
Payer: MEDICARE

## 2024-12-03 DIAGNOSIS — E78.49 OTHER HYPERLIPIDEMIA: ICD-10-CM

## 2024-12-03 LAB
ALBUMIN SERPL-MCNC: 3.4 G/DL (ref 3.4–5)
ALBUMIN/GLOB SERPL: 1.1 (ref 0.8–1.7)
ALP SERPL-CCNC: 70 U/L (ref 45–117)
ALT SERPL-CCNC: 25 U/L (ref 13–56)
ANION GAP SERPL CALC-SCNC: 3 MMOL/L (ref 3–18)
AST SERPL-CCNC: 25 U/L (ref 10–38)
BASOPHILS # BLD: 0 K/UL (ref 0–0.1)
BASOPHILS NFR BLD: 0 % (ref 0–2)
BILIRUB SERPL-MCNC: 0.5 MG/DL (ref 0.2–1)
BUN SERPL-MCNC: 28 MG/DL (ref 7–18)
BUN/CREAT SERPL: 26 (ref 12–20)
CALCIUM SERPL-MCNC: 9.1 MG/DL (ref 8.5–10.1)
CHLORIDE SERPL-SCNC: 109 MMOL/L (ref 100–111)
CHOLEST SERPL-MCNC: 142 MG/DL
CO2 SERPL-SCNC: 33 MMOL/L (ref 21–32)
CREAT SERPL-MCNC: 1.09 MG/DL (ref 0.6–1.3)
DIFFERENTIAL METHOD BLD: ABNORMAL
EOSINOPHIL # BLD: 0.1 K/UL (ref 0–0.4)
EOSINOPHIL NFR BLD: 2 % (ref 0–5)
ERYTHROCYTE [DISTWIDTH] IN BLOOD BY AUTOMATED COUNT: 13.3 % (ref 11.6–14.5)
GLOBULIN SER CALC-MCNC: 3.1 G/DL (ref 2–4)
GLUCOSE SERPL-MCNC: 93 MG/DL (ref 74–99)
HCT VFR BLD AUTO: 39.5 % (ref 35–45)
HDLC SERPL-MCNC: 64 MG/DL (ref 40–60)
HDLC SERPL: 2.2 (ref 0–5)
HGB BLD-MCNC: 12.3 G/DL (ref 12–16)
IMM GRANULOCYTES # BLD AUTO: 0 K/UL (ref 0–0.04)
IMM GRANULOCYTES NFR BLD AUTO: 0 % (ref 0–0.5)
LDLC SERPL CALC-MCNC: 68 MG/DL (ref 0–100)
LIPID PANEL: ABNORMAL
LYMPHOCYTES # BLD: 0.9 K/UL (ref 0.9–3.6)
LYMPHOCYTES NFR BLD: 18 % (ref 21–52)
MCH RBC QN AUTO: 30.8 PG (ref 24–34)
MCHC RBC AUTO-ENTMCNC: 31.1 G/DL (ref 31–37)
MCV RBC AUTO: 99 FL (ref 78–100)
MONOCYTES # BLD: 0.5 K/UL (ref 0.05–1.2)
MONOCYTES NFR BLD: 10 % (ref 3–10)
NEUTS SEG # BLD: 3.3 K/UL (ref 1.8–8)
NEUTS SEG NFR BLD: 69 % (ref 40–73)
NRBC # BLD: 0 K/UL (ref 0–0.01)
NRBC BLD-RTO: 0 PER 100 WBC
PLATELET # BLD AUTO: 174 K/UL (ref 135–420)
PMV BLD AUTO: 10.7 FL (ref 9.2–11.8)
POTASSIUM SERPL-SCNC: 4.4 MMOL/L (ref 3.5–5.5)
PROT SERPL-MCNC: 6.5 G/DL (ref 6.4–8.2)
RBC # BLD AUTO: 3.99 M/UL (ref 4.2–5.3)
SODIUM SERPL-SCNC: 145 MMOL/L (ref 136–145)
TRIGL SERPL-MCNC: 50 MG/DL
VLDLC SERPL CALC-MCNC: 10 MG/DL
WBC # BLD AUTO: 4.8 K/UL (ref 4.6–13.2)

## 2024-12-03 PROCEDURE — 36415 COLL VENOUS BLD VENIPUNCTURE: CPT

## 2024-12-03 PROCEDURE — 85025 COMPLETE CBC W/AUTO DIFF WBC: CPT

## 2024-12-03 PROCEDURE — 80061 LIPID PANEL: CPT

## 2024-12-03 PROCEDURE — 80053 COMPREHEN METABOLIC PANEL: CPT

## 2024-12-05 ENCOUNTER — OFFICE VISIT (OUTPATIENT)
Age: 89
End: 2024-12-05

## 2024-12-05 VITALS — RESPIRATION RATE: 16 BRPM | HEIGHT: 67 IN | BODY MASS INDEX: 19.2 KG/M2

## 2024-12-05 DIAGNOSIS — M25.512 ACUTE PAIN OF LEFT SHOULDER: Primary | ICD-10-CM

## 2024-12-05 DIAGNOSIS — M19.012 GLENOHUMERAL ARTHRITIS, LEFT: ICD-10-CM

## 2024-12-05 RX ORDER — TRIAMCINOLONE ACETONIDE 40 MG/ML
40 INJECTION, SUSPENSION INTRA-ARTICULAR; INTRAMUSCULAR ONCE
Status: COMPLETED | OUTPATIENT
Start: 2024-12-05 | End: 2024-12-05

## 2024-12-05 RX ADMIN — TRIAMCINOLONE ACETONIDE 40 MG: 40 INJECTION, SUSPENSION INTRA-ARTICULAR; INTRAMUSCULAR at 16:19

## 2024-12-05 NOTE — PROGRESS NOTES
female   in no acute distress.  The patient is alert and oriented times three.  The patient is alert and oriented times three. Mood and affect are normal.  LYMPHATIC: lymph nodes are not enlarged and are within normal limits  SKIN: normal in color and non tender to palpation. There are no bruises or abrasions noted.   NEUROLOGICAL: Motor sensory exam is within normal limits. Reflexes are equal bilaterally. There is normal sensation to pinprick and light touch  MUSCULOSKELETAL: Inspection of the left shoulder no swelling active abduction 60 degrees external rotation 30 degrees with discomfort positive impingement positive supraspinatus stress test    PROCEDURE: left shoulder Injection with Ultrasound Guidance    Indication:  left shoulder pain/swelling    After sterile prep, 6mL lidocaine with 1mL 40mg Kenalog were injected into the left shoulder  intraarticular under ultrasound guidance. Ultrasound images captured and scanned into patient's chart.        [facility]  OFFICE PROCEDURE PROGRESS NOTE        Chart reviewed for the following:  Tyrell WILD MD, have reviewed the History, Physical and updated the Allergic reactions for Berkley Clarke     TIME OUT performed immediately prior to start of procedure:  Tyrell WILD MD have performed the following reviews on Berkley Clarke prior to the start of the procedure:            * Patient was identified by name and date of birth   * Agreement on procedure being performed was verified  * Risks and Benefits explained to the patient  * Procedure site verified and marked as necessary  * Patient was positioned for comfort  * Consent was signed and verified     Time: 4:16 PM    Date of procedure: 12/5/2024    Procedure performed by:  Tyrell Ng MD    Provider assisted by: (see medication administration)    How tolerated by patient: tolerated the procedure well with no complications    Comments: none     IMAGING: XR of the left

## 2024-12-10 ENCOUNTER — OFFICE VISIT (OUTPATIENT)
Facility: CLINIC | Age: 89
End: 2024-12-10
Payer: MEDICARE

## 2024-12-10 ENCOUNTER — OFFICE VISIT (OUTPATIENT)
Facility: CLINIC | Age: 89
End: 2024-12-10

## 2024-12-10 ENCOUNTER — HOSPITAL ENCOUNTER (OUTPATIENT)
Facility: HOSPITAL | Age: 89
Setting detail: SPECIMEN
Discharge: HOME | End: 2024-12-13
Payer: MEDICARE

## 2024-12-10 VITALS
HEIGHT: 67 IN | TEMPERATURE: 97.8 F | WEIGHT: 122.4 LBS | HEART RATE: 74 BPM | BODY MASS INDEX: 19.21 KG/M2 | DIASTOLIC BLOOD PRESSURE: 68 MMHG | OXYGEN SATURATION: 97 % | SYSTOLIC BLOOD PRESSURE: 134 MMHG

## 2024-12-10 VITALS
DIASTOLIC BLOOD PRESSURE: 68 MMHG | SYSTOLIC BLOOD PRESSURE: 134 MMHG | BODY MASS INDEX: 19.21 KG/M2 | HEART RATE: 74 BPM | WEIGHT: 122.4 LBS | TEMPERATURE: 97.8 F | HEIGHT: 67 IN

## 2024-12-10 DIAGNOSIS — E53.8 VITAMIN B12 DEFICIENCY: ICD-10-CM

## 2024-12-10 DIAGNOSIS — R41.3 MEMORY IMPAIRMENT: ICD-10-CM

## 2024-12-10 DIAGNOSIS — E55.9 VITAMIN D DEFICIENCY: ICD-10-CM

## 2024-12-10 DIAGNOSIS — Z86.73 HISTORY OF TIA (TRANSIENT ISCHEMIC ATTACK): ICD-10-CM

## 2024-12-10 DIAGNOSIS — N18.31 CKD STAGE 3A, GFR 45-59 ML/MIN (HCC): ICD-10-CM

## 2024-12-10 DIAGNOSIS — N39.0 RECURRENT UTI: ICD-10-CM

## 2024-12-10 DIAGNOSIS — M79.10 MYALGIA: ICD-10-CM

## 2024-12-10 DIAGNOSIS — Z00.00 MEDICARE ANNUAL WELLNESS VISIT, SUBSEQUENT: Primary | ICD-10-CM

## 2024-12-10 DIAGNOSIS — M79.10 MYALGIA: Primary | ICD-10-CM

## 2024-12-10 PROBLEM — E78.49 OTHER HYPERLIPIDEMIA: Status: RESOLVED | Noted: 2024-10-29 | Resolved: 2024-12-10

## 2024-12-10 LAB
25(OH)D3 SERPL-MCNC: 78.3 NG/ML (ref 30–100)
TSH SERPL-ACNC: 0.83 UIU/ML (ref 0.36–3.74)
VIT B12 SERPL-MCNC: 1357 PG/ML (ref 211–911)

## 2024-12-10 PROCEDURE — 1123F ACP DISCUSS/DSCN MKR DOCD: CPT | Performed by: FAMILY MEDICINE

## 2024-12-10 PROCEDURE — G0439 PPPS, SUBSEQ VISIT: HCPCS | Performed by: FAMILY MEDICINE

## 2024-12-10 PROCEDURE — 1159F MED LIST DOCD IN RCRD: CPT | Performed by: FAMILY MEDICINE

## 2024-12-10 PROCEDURE — 82306 VITAMIN D 25 HYDROXY: CPT

## 2024-12-10 PROCEDURE — 36415 COLL VENOUS BLD VENIPUNCTURE: CPT

## 2024-12-10 PROCEDURE — 1160F RVW MEDS BY RX/DR IN RCRD: CPT | Performed by: FAMILY MEDICINE

## 2024-12-10 PROCEDURE — 82607 VITAMIN B-12: CPT

## 2024-12-10 PROCEDURE — 84443 ASSAY THYROID STIM HORMONE: CPT

## 2024-12-10 PROCEDURE — G8484 FLU IMMUNIZE NO ADMIN: HCPCS | Performed by: FAMILY MEDICINE

## 2024-12-10 RX ORDER — ROSUVASTATIN CALCIUM 5 MG/1
5 TABLET, COATED ORAL DAILY
Qty: 90 TABLET | Refills: 1 | Status: SHIPPED | OUTPATIENT
Start: 2024-12-10

## 2024-12-10 ASSESSMENT — LIFESTYLE VARIABLES
HOW MANY STANDARD DRINKS CONTAINING ALCOHOL DO YOU HAVE ON A TYPICAL DAY: PATIENT DOES NOT DRINK
HOW OFTEN DO YOU HAVE A DRINK CONTAINING ALCOHOL: NEVER

## 2024-12-10 ASSESSMENT — PATIENT HEALTH QUESTIONNAIRE - PHQ9
SUM OF ALL RESPONSES TO PHQ QUESTIONS 1-9: 0
2. FEELING DOWN, DEPRESSED OR HOPELESS: NOT AT ALL
SUM OF ALL RESPONSES TO PHQ9 QUESTIONS 1 & 2: 0
1. LITTLE INTEREST OR PLEASURE IN DOING THINGS: NOT AT ALL
SUM OF ALL RESPONSES TO PHQ QUESTIONS 1-9: 0

## 2024-12-10 NOTE — ASSESSMENT & PLAN NOTE
Abnormal clock draw and word recall. No feedback from others regarding memory. Checking/optimizing TSH, B12

## 2024-12-10 NOTE — PROGRESS NOTES
Chief Complaint   Patient presents with    Cholesterol Problem     Patient here today to be seen for 6 week follow up and lab review.        \"Have you been to the ER, urgent care clinic since your last visit?  Hospitalized since your last visit?\"    NO    “Have you seen or consulted any other health care providers outside our system since your last visit?”    NO

## 2024-12-10 NOTE — PROGRESS NOTES
Chief Complaint   Patient presents with    Medicare AWV     \"Have you been to the ER, urgent care clinic since your last visit?  Hospitalized since your last visit?\"    NO    “Have you seen or consulted any other health care providers outside our system since your last visit?”    NO

## 2024-12-10 NOTE — ACP (ADVANCE CARE PLANNING)
Advance Care Planning   General Advance Care Planning (ACP) Conversation    Date of Conversation: 12/10/2024  Conducted with: Patient with Decision Making Capacity  Other persons present: None    Healthcare Decision Maker:    Primary Decision Maker: Marcello Clarke - Child - [phone]    Secondary Decision Maker: Laura Clarke - Child - [phone]      Daughter is a nurse. \"My living will says my daughter and my son will discuss with the doctor at the time of an emergency and make the decision. I don't want to live on a test tube. Let me go. I'm a saved woman. I'd rather go.\"      Content/Action Overview:  Has ACP document(s) NOT on file - requested patient to provide          Length of Voluntary ACP Conversation in minutes:  <16 minutes (Non-Billable)    Claudine Bearden MD

## 2024-12-10 NOTE — PROGRESS NOTES
Berkley Clarke (: 1935) is a 89 y.o. female, established patient, here for:    ASSESSMENT/PLAN:  History of TIA (transient ischemic attack)  Assessment & Plan:  Myalgias with atorvastatin. discontinued 3 weeks ago.  - rosuvastatin 5 mg daily  - Start with one pill and wait a few days to monitor for any adverse effects  - Gradually increase dosage if tolerated  - Conduct vitamin D level test and provide supplementation if low    Orders:  -     rosuvastatin (CRESTOR) 5 MG tablet; Take 1 tablet by mouth daily START WITH 1 OR 2 TIMES PER WEEK. INCREASE AS TOLERATED, Disp-90 tablet, R-1Normal  Myalgia  -     rosuvastatin (CRESTOR) 5 MG tablet; Take 1 tablet by mouth daily START WITH 1 OR 2 TIMES PER WEEK. INCREASE AS TOLERATED, Disp-90 tablet, R-1Normal  -     Vitamin D 25 Hydroxy; Future  Vitamin D deficiency  -     Vitamin D 25 Hydroxy; Future  Memory impairment  Assessment & Plan:  Abnormal clock draw and word recall. No feedback from others regarding memory. Checking/optimizing TSH, B12   Orders:  -     Vitamin B12; Future  -     TSH with Reflex; Future  Vitamin B12 deficiency  Assessment & Plan:  No current replacement. Status?   CKD stage 3a, GFR 45-59 ml/min ([facility])  Recurrent UTI  Assessment & Plan:  Established with Dr. Yu.          Follow-up and Dispositions    Return in about 6 months (around 6/10/2025) for chronic medical conditions, labs prior, (30), subject to change based on results.            SUBJECTIVE/OBJECTIVE:  Chief Complaint   Patient presents with    Cholesterol Problem     Patient here today to be seen for 6 week follow up and lab review.       History of Present Illness  The patient is an 89-year-old female who presents for a follow-up on recent lab work and to update her Medicare wellness.    She discontinued her Lipitor medication 3 weeks ago due to severe leg pain, which she reports has since subsided. She had been on Lipitor since 2024. She was prescribed Lipitor following

## 2024-12-10 NOTE — PATIENT INSTRUCTIONS
Learning About Being Active as an Older Adult  Why is being active important as you get older?     Being active is one of the best things you can do for your health. And it's never too late to start. Being active--or getting active, if you aren't already--has definite benefits. It can:  Give you more energy,  Keep your mind sharp.  Improve balance to reduce your risk of falls.  Help you manage chronic illness with fewer medicines.  No matter how old you are, how fit you are, or what health problems you have, there is a form of activity that will work for you. And the more physical activity you can do, the better your overall health will be.  What kinds of activity can help you stay healthy?  Being more active will make your daily activities easier. Physical activity includes planned exercise and things you do in daily life. There are four types of activity:  Aerobic.  Doing aerobic activity makes your heart and lungs strong.  Includes walking, dancing, and gardening.  Aim for at least 2½ hours spread throughout the week.  It improves your energy and can help you sleep better.  Muscle-strengthening.  This type of activity can help maintain muscle and strengthen bones.  Includes climbing stairs, using resistance bands, and lifting or carrying heavy loads.  Aim for at least twice a week.  It can help protect the knees and other joints.  Stretching.  Stretching gives you better range of motion in joints and muscles.  Includes upper arm stretches, calf stretches, and gentle yoga.  Aim for at least twice a week, preferably after your muscles are warmed up from other activities.  It can help you function better in daily life.  Balancing.  This helps you stay coordinated and have good posture.  Includes heel-to-toe walking, deann chi, and certain types of yoga.  Aim for at least 3 days a week.  It can reduce your risk of falling.  Even if you have a hard time meeting the recommendations, it's better to be more active

## 2024-12-10 NOTE — PROGRESS NOTES
Medicare Annual Wellness Visit    Berkley Clarke is here for Medicare AWV    Assessment & Plan   Medicare annual wellness visit, subsequent    Recommendations for Preventive Services Due: see orders and patient instructions/AVS.  Recommended screening schedule for the next 5-10 years is provided to the patient in written form: see Patient Instructions/AVS.     No follow-ups on file.     Subjective       Patient's complete Health Risk Assessment and screening values have been reviewed and are found in Flowsheets. The following problems were reviewed today and where indicated follow up appointments were made and/or referrals ordered.    Positive Risk Factor Screenings with Interventions:     Cognitive:   Clock Drawing Test (CDT): (!) Abnormal  Words recalled: 1 Word Recalled  Total Score: (!) 1  Total Score Interpretation: Abnormal Mini-Cog  Interventions:  Patient advised to follow-up in this office for further evaluation and treatment            Inactivity:  On average, how many days per week do you engage in moderate to strenuous exercise (like a brisk walk)?: 0 days (!) Abnormal  On average, how many minutes do you engage in exercise at this level?: 0 min  Interventions:  See AVS for additional education material                         Objective   Vitals:    12/10/24 1413   BP: 134/68   Site: Left Upper Arm   Position: Sitting   Pulse: 74   Temp: 97.8 °F (36.6 °C)   TempSrc: Tympanic   Weight: 55.5 kg (122 lb 6.4 oz)   Height: 1.702 m (5' 7\")      Body mass index is 19.17 kg/m².                  Allergies   Allergen Reactions    Latex Anaphylaxis    Codeine Nausea And Vomiting    Doxycycline Hives     States can take with benadryl    Penicillins Hives     Other reaction(s): mild rash/itching      Sulfa Antibiotics Hives     Other reaction(s): mild rash/itching    Tetracycline Hives    Scopolamine Other (See Comments)     Other reaction(s): cardiac dysrhythmia  Patient reports A-Fib arrythmia    Formaldehyde Rash

## 2024-12-10 NOTE — ASSESSMENT & PLAN NOTE
Myalgias with atorvastatin. discontinued 3 weeks ago.  - rosuvastatin 5 mg daily  - Start with one pill and wait a few days to monitor for any adverse effects  - Gradually increase dosage if tolerated  - Conduct vitamin D level test and provide supplementation if low

## 2024-12-26 ENCOUNTER — PATIENT MESSAGE (OUTPATIENT)
Facility: CLINIC | Age: 89
End: 2024-12-26

## 2024-12-26 DIAGNOSIS — Z86.73 HISTORY OF TIA (TRANSIENT ISCHEMIC ATTACK): ICD-10-CM

## 2024-12-26 DIAGNOSIS — E78.49 OTHER HYPERLIPIDEMIA: Primary | ICD-10-CM

## 2024-12-31 RX ORDER — ATORVASTATIN CALCIUM 20 MG/1
20 TABLET, FILM COATED ORAL DAILY
Qty: 90 TABLET | Refills: 3 | Status: SHIPPED | OUTPATIENT
Start: 2024-12-31

## 2025-03-31 NOTE — PROGRESS NOTES
Patient ID: Tracey Chiu is a 80 y.o. female.    DIAGNOSIS     Adenocarcinoma of the lung.  Date of diagnosis is March 3 of 2022 where a posterior pleural biopsy demonstrated adenocarcinoma.  Immunohistochemistry positive for CK7 and TTF-1 1.        STAGING     T4 (Mediastinal fat invasion  seen by direct observation during thoracoscopy) N0, M1 a (left pleural involvement)        CURRENT SITES OF DISEASE     ELIZABETH  with mediastinal fat invasion, left pleura        MOLECULAR GENOMICS     PD-L1 immunohistochemistry tumor proportional score 20%  Next generation sequencing using [facility] solid tumor panel  EGFR [id number] mutation positive        SERUM TUMOR MARKER     Normal CEA and CA 19.9 in March 2022        PRIOR THERAPY     1-   Left pleural talc pleurodesis dated March 3, 2022        CURRENT THERAPY     Single agent Tagrisso, started April 14, 2022. Documented response with resolution of left pleural effusion and reduction of left upper lobe lesion in June 2022        CURRENT ONCOLOGICAL PROBLEMS     1-  peripancreatic inflammation/stranding seen on imaging suggestive of pancreatitis, possibly alcohol related.This was seen at time of diagnosis.  Continue to follow pancreatic imaging consider referral for pancreatic evaluation.  MRI of the pancreas  done on July 19, 2022.  Refer to pancreatic surgery for evaluation.  Now followed by Dr. Calderon    2- hospitalized 11/22 for COPD exacerbation        HISTORY OF PRESENT ILLNESS     This is a 79-year-old patient.  She had a fall in October 2021 where she found herself falling down many stairs.  There was apparent loss of consciousness according to the daughter. She was admitted to the hospital and underwent a posterior cervical fusion  for fracture on October 28, 2021.  Reportedly at that time a lung lesion was found and imaging studies including a CT scan was done demonstrating a lesion abutting the left upper lobe.  Upon recovery from her neck surgery further  Viktoriya Cotton presents today for   Chief Complaint   Patient presents with    Follow-up     4 month       Is someone accompanying this pt? no    Is the patient using any DME equipment during OV? no    Depression Screening:  3 most recent PHQ Screens 8/6/2019   Little interest or pleasure in doing things Not at all   Feeling down, depressed, irritable, or hopeless Not at all   Total Score PHQ 2 0       Learning Assessment:  Learning Assessment 7/24/2018   PRIMARY LEARNER Patient   HIGHEST LEVEL OF EDUCATION - PRIMARY LEARNER  SOME COLLEGE   BARRIERS PRIMARY LEARNER NONE   CO-LEARNER CAREGIVER No   PRIMARY LANGUAGE ENGLISH   LEARNER PREFERENCE PRIMARY LISTENING   ANSWERED BY patient   RELATIONSHIP SELF       Abuse Screening:  Abuse Screening Questionnaire 7/24/2018   Do you ever feel afraid of your partner? N   Are you in a relationship with someone who physically or mentally threatens you? N   Is it safe for you to go home? Y       Fall Risk  Fall Risk Assessment, last 12 mths 8/6/2019   Able to walk? Yes   Fall in past 12 months? No       Health Maintenance reviewed and discussed and ordered per Provider. Health Maintenance Due   Topic Date Due    Shingrix Vaccine Age 49> (1 of 2) 10/27/1987    GLAUCOMA SCREENING Q2Y  10/27/2002    MEDICARE YEARLY EXAM  10/18/2018    Influenza Age 9 to Adult  08/01/2019           Coordination of Care:  1. Have you been to the ER, urgent care clinic since your last visit? Hospitalized since your last visit? no    2. Have you seen or consulted any other health care providers outside of the [address] [place] [address] since your last visit? Include any pap smears or colon screening.  Dr. Tom Johnson work-up was performed.  A combined PET/CT dated February a 2022 showed hypermetabolic activity in the left upper lobe mass abutting the mediastinum with no mediastinal lymphadenopathy.  There was some fat stranding and soft tissue fullness along the pancreatic head and proximal  body with mild hypermetabolic activity suggestive of pancreatitis.  There was intense hypermetabolic activity identified within the ethmoid sinuses and to a lesser extent the left maxillary antrum consistent with sinusitis. He had a CT scan of the chest  on March 1 of 2022 showing a left upper lobe mass abutting the left side of the upper mediastinum measuring 3.6 cm in greatest dimension.  There was a small to moderate left pleural effusion which had increased in size compared to the PET/CT of February 8 of 2022 and it was new compared to the CT scan of the chest of October 28 of 2021.  There was scattered bilateral pulmonary nodules however most of these were only a few millimeters and nonsignificant and unchanged compared to October.  There was also  a 2.8 cm hypodense area in the pancreatic head and uncinate process.  There was some mild stranding of the peripancreatic fat which was thought to be related to inflammation around the pancreas.  There was a compression fracture of L1 vertebral body that  was stable. She was admitted to the hospital from March 3 to March 5, 2022 for a video-assisted thorascopic pleural biopsy and talc pleurodesis.Operative report from March 3, 2022 states that about 100 cc of serosanguineous fluid was removed.  There were  small subtle pleural nodules within the posterior inferior part of the pleural that was seen along with diffuse thickening of the pleura anterolaterally which was biopsied.  The tumor seemed to invade the mediastinal fat by inspection.  Talc was inserted  after frozen sections demonstrated malignancy.        PAST MEDICAL HISTORY     1-  nasal polyps s/p surgery in November 2021  2-   hypertension  3-  follow-up in October 2021 resulting in cervical vertebral body fracture s/p cervical posterior fusion on October 28, 2021  4-  pancreatitis?  Seen on PET and CT imaging of February and March 2022.  Pancreatic inflammation is stranding in the setting of possible alcohol abuse.  5-   L4 compression vertebral body fracture seen on imaging studies since February 2022        SOCIAL HISTORY     Patient  lives in [place] on her own.   She has 1 daughter Didi who is an  in [place].  He started smoking at the age of 18.  She stopped in 1994.   She smoked for 25 years 1 pack a day.  She is retired.  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  Patient and daughter report that she has several glasses of wine at night.        CURRENT MEDS     See medication list, meds reviewed        ALLERGIES     patient is allergic to aspirin and has nasal polyps associated with this        FAMILY HISTORY     Patient's father had prostate cancer at the age of 70 patient's mother had ovarian cancer at the age of 86 and patient's brother also had prostate cancer at age 68       Subjective    Today I am meeting with Tracey and her partner.  They report that she is feeling fairly well overall, with some treatment related soft to liquid stools, not exceeding 3 per day.  They are planning [place] road trip out [place] soon, and she is somewhat concerned about her bowels for that trip.  She has some nail chipping characteristic of Tagrisso therapy.      Cancer  Pertinent negatives include no abdominal pain, anorexia, arthralgias, change in bowel habit, chest pain, chills, congestion, coughing, diaphoresis, fatigue, fever, headaches, joint swelling, myalgias, nausea, neck pain, numbness, rash, sore throat, swollen glands, urinary symptoms, vertigo, visual change, vomiting or weakness.     IObjective    BSA: 2.3 meters squared  /65 (BP  "Location: Left arm, Patient Position: Sitting, BP Cuff Size: Adult)   Pulse 77   Temp 36.1 °C (97 °F) (Temporal)   Resp 20   Ht (S) 1.737 m (5' 8.39\")   Wt 110 kg (242 lb 8.1 oz)   LMP  (LMP Unknown)   SpO2 97%   BMI 36.46 kg/m²      Physical Exam  Constitutional:       General: She is not in acute distress.     Appearance: Normal appearance. She is not ill-appearing or diaphoretic.   HENT:      Nose: No congestion or rhinorrhea.      Mouth/Throat:      Pharynx: No oropharyngeal exudate or posterior oropharyngeal erythema.   Eyes:      General: No scleral icterus.     Conjunctiva/sclera: Conjunctivae normal.   Cardiovascular:      Rate and Rhythm: Normal rate and regular rhythm.      Pulses: Normal pulses.      Heart sounds: Normal heart sounds. No murmur heard.     No friction rub. No gallop.   Pulmonary:      Effort: Pulmonary effort is normal. No respiratory distress.      Breath sounds: Normal breath sounds. No stridor. No wheezing, rhonchi or rales.   Chest:      Chest wall: No tenderness.   Abdominal:      General: Abdomen is flat. Bowel sounds are normal. There is no distension.      Palpations: Abdomen is soft. There is no mass.      Tenderness: There is no abdominal tenderness. There is no guarding or rebound.   Musculoskeletal:      Cervical back: No tenderness.      Right lower leg: No edema.      Left lower leg: No edema.   Lymphadenopathy:      Cervical: No cervical adenopathy.   Skin:     General: Skin is warm.      Coloration: Skin is not jaundiced.   Neurological:      General: No focal deficit present.      Mental Status: She is oriented to person, place, and time.   Psychiatric:         Mood and Affect: Mood normal.         Behavior: Behavior normal.         Performance Status:  Asymptomatic    CT CHEST W IV CONTRAST; 12/19/2024   IMPRESSION:  1.  The size of the left paramediastinal upper lobe spiculated mass  is stable from the 2 prior exams.  2. Again noted air bladder likely sequela of " a pleurodesis procedure  on the left. These appear stable.  3. Associated with the superior aspect of the left inter lobar  fissure in the superior segment of the left lower lobe there is a  small nodule measuring 7 mm and does appear to have increased from 4  mm previously. Attention follow-up recommended. This could be due to  slice selection factors though this is not certain.  4. Findings in the upper abdomen which have been previously evaluated  with MRI appear grossly stable.      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  She is approaching 3 years since starting her treatment with a good response.  CT scan done in December showed no evidence of disease progression.  There is a 7 mm nodule that remains nonspecific and we will continue to evaluate.  Will see her back in 3 months time with blood work and a repeat CT scan.  Blood work today is excellent and shows no signs of toxicity.        Cancer Staging   No matching staging information was found for the patient.      Oncology History   Lung cancer (Multi)   8/7/2023 Initial Diagnosis    Lung cancer (CMS/HCC)     11/29/2023 -  Chemotherapy    Osimertinib, 28 Day Cycles                   JONATHON Allison-CNS

## 2025-04-22 ENCOUNTER — OFFICE VISIT (OUTPATIENT)
Age: 89
End: 2025-04-22

## 2025-04-22 VITALS — WEIGHT: 124 LBS | HEIGHT: 67 IN | BODY MASS INDEX: 19.46 KG/M2

## 2025-04-22 DIAGNOSIS — M25.512 LEFT SHOULDER PAIN, UNSPECIFIED CHRONICITY: Primary | ICD-10-CM

## 2025-04-22 DIAGNOSIS — M19.012 GLENOHUMERAL ARTHRITIS, LEFT: ICD-10-CM

## 2025-04-22 RX ORDER — TRIAMCINOLONE ACETONIDE 40 MG/ML
40 INJECTION, SUSPENSION INTRA-ARTICULAR; INTRAMUSCULAR ONCE
Status: COMPLETED | OUTPATIENT
Start: 2025-04-22 | End: 2025-04-22

## 2025-04-22 RX ADMIN — TRIAMCINOLONE ACETONIDE 40 MG: 40 INJECTION, SUSPENSION INTRA-ARTICULAR; INTRAMUSCULAR at 10:32

## 2025-04-22 NOTE — PROGRESS NOTES
Berkley Clarke  1935   Chief Complaint   Patient presents with    Shoulder Pain     Left          HISTORY OF PRESENT ILLNESS  Berkley Clarke is a 89 y.o. female who presents today for reevaluation of left shoulder pain. Pain is a 5/10. Pt received a left shoulder cortisone injection last OV on 12/5/2024 in which her shoulder responded to for a month. Now having increased pain at night time and with movement.     Patient denies any fever, chills, chest pain, shortness of breath or calf pain. The remainder of the review of systems is negative. There are no new illness or injuries other than that mentioned above to report since last seen in the office. No changes in medications, allergies, social or family history.      PHYSICAL EXAM:   Ht 1.702 m (5' 7\")   Wt 56.2 kg (124 lb)   BMI 19.42 kg/m²   The patient is a well-developed, well-nourished female   in no acute distress.  The patient is alert and oriented times three.  The patient is alert and oriented times three. Mood and affect are normal.  LYMPHATIC: lymph nodes are not enlarged and are within normal limits  SKIN: normal in color and non tender to palpation. There are no bruises or abrasions noted.   NEUROLOGICAL: Motor sensory exam is within normal limits. Reflexes are equal bilaterally. There is normal sensation to pinprick and light touch  MUSCULOSKELETAL: Inspection no swelling glenohumeral abduction 80 degrees external rotation 30 degrees with pain         PROCEDURE: left shoulder Injection with Ultrasound Guidance    Indication:  left shoulder pain/swelling    After sterile prep, 6mL lidocaine with 1mL 40mg Kenalog were injected into the left glenohumeral joint under ultrasound guidance. Ultrasound images captured and scanned into patient's chart.        [facility] - [facility]  OFFICE PROCEDURE PROGRESS NOTE        Chart reviewed for the following:  ITyrell MD, have reviewed the History, Physical and updated

## 2025-05-01 ENCOUNTER — HOSPITAL ENCOUNTER (OUTPATIENT)
Facility: HOSPITAL | Age: 89
Setting detail: RECURRING SERIES
Discharge: HOME | End: 2025-05-04
Attending: ORTHOPAEDIC SURGERY
Payer: MEDICARE

## 2025-05-01 PROCEDURE — 97161 PT EVAL LOW COMPLEX 20 MIN: CPT

## 2025-05-01 PROCEDURE — 97535 SELF CARE MNGMENT TRAINING: CPT

## 2025-05-01 PROCEDURE — 97110 THERAPEUTIC EXERCISES: CPT

## 2025-05-01 NOTE — THERAPY EVALUATION
LOW Complexity : Stable, uncomplicated  ;Clinical Decision Making: QuickDASH: Disability Arm, Shoulder, Hand = 18 % ; (0% - 40% Normal to Mild Disability) = LOW Complexity  Overall Complexity Rating: LOW   Problem List: pain affecting function, decrease ROM, decrease strength, decrease ADL/functional abilities, decrease activity tolerance, decrease flexibility/joint mobility, and other DASH 18%    Treatment Plan may include any combination of the followin Therapeutic Exercise, 37530 Neuromuscular Re-Education, 72847 Manual Therapy, 72576 Therapeutic Activity, 58446 Self Care/Home Management, 27677 Electrical Stim unattended /  (MCR), 90269 Electrical Stim attended, and 18952 Ultrasound  Patient / Family readiness to learn indicated by: asking questions, trying to perform skills, interest, return verbalization , and return demonstration   Persons(s) to be included in education: patient (P)  Barriers to Learning/Limitations: none  Measures taken if barriers to learning present: NA  Patient Goal (s): be able to work without pain   Patient Self Reported Health Status: good  Rehabilitation Potential: good    Short Term Goals: To be accomplished in 4 WEEKS  1 patient will have established and be I with HEP to aid with independence and self management at discharge  EVAL HEP issued at evaluation  2 patient will have pain 2/10 to aid with increase tolerance to ADLS and regular daily activities at home and in the community  EVAL 3    Long Term Goals: To be accomplished in 8 WEEKS  1 patient will have established and be I with HEP to aid with independence and self management at discharge  EVAL HEP issued at evaluation  2 patient will have pain 1/10 to aid with increase tolerance to ADLS and regular daily activities at home and in the community  EVAL 3  3 patient will have DASH 10% to show improvement with ADLS and activities at home and in the community  EVAL 18%  4 patient will have L shoulder AROM F 130 and abd

## 2025-05-01 NOTE — PROGRESS NOTES
proprioception to improve patient's ability to progress to PLOF and address remaining functional goals. (see flow sheet as applicable)     Details if applicable:     8 8 [phone] Self Care/Home Management (timed):  improve patient knowledge and understanding of home injury/symptom/pain management, positioning, posture/ergonomics, home safety, activity modification, transfer techniques, and joint protection strategies  to improve patient's ability to progress to PLOF and address remaining functional goals.  (see flow sheet as applicable)     Details if applicable:            Details if applicable:            Details if applicable:            Details if applicable:     23 Total 23 Total Reminder: MC/BC bill using total billable min of TIMED therapeutic procedures (example: do not include dry needle or estim unattended, both untimed codes, in totals to left)     Charge Capture    [x]  Patient Education billed concurrently with other procedures     Other Objective/Functional Measures:      Physical Therapy Evaluation - Shoulder    Posture: [x] Poor    [] Fair    [] Good    Describe:    ROM:  [] Unable to assess at this time                                           AROM                                                              PROM   Left Right  Left Right   Flexion 102 150 Flexion 135    Extension   Extension     Scaption/ABD 88 155 Scaptin/    ER @ 0 Degrees HBH able \"slowly\" decrease ease HBH able ER @ 0 Degrees     ER @ 90 Degrees   ER @ 90 Degrees     IR @ 90 Degrees HBB to SIJ HBB to lower Tsp  IR @ 90 Degrees       End Feel / Painful Arc:    Strength:   [] Unable to assess at this time                                                                            L (1-5) R (1-5) Pain   Flexors 4+ 4+ [x] Yes   [] No   Abductors 4 4+ [x] Yes   [] No   External Rotators 4 4 [x] Yes   [] No   Internal Rotators 4+  4+ [x] Yes   [] No   Supraspinatus   [] Yes   [] No   Serratus Anterior   [] Yes   [] No   Lower

## 2025-05-13 ENCOUNTER — HOSPITAL ENCOUNTER (OUTPATIENT)
Facility: HOSPITAL | Age: 89
Setting detail: RECURRING SERIES
Discharge: HOME | End: 2025-05-16
Attending: ORTHOPAEDIC SURGERY
Payer: MEDICARE

## 2025-05-13 PROCEDURE — 97140 MANUAL THERAPY 1/> REGIONS: CPT

## 2025-05-13 PROCEDURE — 97110 THERAPEUTIC EXERCISES: CPT

## 2025-05-13 PROCEDURE — 97530 THERAPEUTIC ACTIVITIES: CPT

## 2025-05-13 NOTE — PROGRESS NOTES
PHYSICAL / OCCUPATIONAL THERAPY - DAILY TREATMENT NOTE    Patient Name: Berkley Clarke    Date: 2025    : 1935  Insurance: Payor: MEDICARE / Plan: MEDICARE PART A AND B / Product Type: *No Product type* /      Patient  verified Yes     Visit #   Current / Total 2 16   Time   In / Out 210 308   Pain   In / Out 3-4 <3-4 \"better\"   Subjective Functional Status/Changes: Pain is deep in the muscles of the arm, not unbearable.      TREATMENT AREA =  Glenohumeral arthritis, left  Pain in left shoulder    OBJECTIVE    Modalities Rationale:     decrease pain and increase tissue extensibility to improve patient's ability to progress to PLOF and address remaining functional goals.     min [] Estim Unattended, type/location:                                      []  w/ice    []  w/heat    min [] Estim Attended, type/location:                                     []  w/US     []  w/ice    []  w/heat    []  TENS insruct      min []  Mechanical Traction: type/lbs                   []  pro   []  sup   []  int   []  cont    []  before manual    []  after manual    min []  Ultrasound, settings/location:     10 min  unbill []  Ice     [x]  Heat   post location/position:reclined with B LE wedge   L arm and shoulder     min []  Paraffin,  details:     min []  Vasopneumatic Device, press/temp:     min []  Whirlpool / Fluido:    If using vaso (only need to measure limb vaso being performed on)      pre-treatment girth :       post-treatment girth :       measured at (landmark location) :      min []  Other:    Skin assessment post-treatment:   Intact      Therapeutic Procedures:    Tx Min Billable or 1:1 Min (if diff from Tx Min) Procedure, Rationale, Specifics   35  [id number] Therapeutic Exercise (timed):  increase ROM, strength, coordination, balance, and proprioception to improve patient's ability to progress to PLOF and address remaining functional goals. (see flow sheet as applicable)     Details if applicable:       8 8

## 2025-05-16 ENCOUNTER — HOSPITAL ENCOUNTER (OUTPATIENT)
Facility: HOSPITAL | Age: 89
Setting detail: RECURRING SERIES
Discharge: HOME | End: 2025-05-19
Attending: ORTHOPAEDIC SURGERY
Payer: MEDICARE

## 2025-05-16 PROCEDURE — 97140 MANUAL THERAPY 1/> REGIONS: CPT

## 2025-05-16 PROCEDURE — 97530 THERAPEUTIC ACTIVITIES: CPT

## 2025-05-16 PROCEDURE — 97110 THERAPEUTIC EXERCISES: CPT

## 2025-05-16 NOTE — PROGRESS NOTES
PHYSICAL / OCCUPATIONAL THERAPY - DAILY TREATMENT NOTE    Patient Name: Berkley Clarke    Date: 2025    : 1935  Insurance: Payor: MEDICARE / Plan: MEDICARE PART A AND B / Product Type: *No Product type* /      Patient  verified Yes     Visit #   Current / Total 3 16   Time   In / Out 1250 140   Pain   In / Out 8 <8 it feels like it is loosened up   Subjective Functional Status/Changes: Reports she has had more shoulder pain since being here the last time. today woke with tingling going down her left arm and the pain overall is more in her posterior L shoulder.      TREATMENT AREA =  Glenohumeral arthritis, left  Pain in left shoulder    OBJECTIVE    Modalities Rationale:     decrease pain and increase tissue extensibility to improve patient's ability to progress to PLOF and address remaining functional goals.     min [] Estim Unattended, type/location:                                      []  w/ice    []  w/heat    min [] Estim Attended, type/location:                                     []  w/US     []  w/ice    []  w/heat    []  TENS insruct      min []  Mechanical Traction: type/lbs                   []  pro   []  sup   []  int   []  cont    []  before manual    []  after manual    min []  Ultrasound, settings/location:     10 min  unbill []  Ice     [x]  Heat  post  location/position: reclined L arm and shoulder     min []  Paraffin,  details:     min []  Vasopneumatic Device, press/temp:     min []  Whirlpool / Fluido:    If using vaso (only need to measure limb vaso being performed on)      pre-treatment girth :       post-treatment girth :       measured at (landmark location) :      min []  Other:    Skin assessment post-treatment:   Redness, no adverse reactions      Therapeutic Procedures:    Tx Min Billable or 1:1 Min (if diff from Tx Min) Procedure, Rationale, Specifics   17 17 [id number] Therapeutic Exercise (timed):  increase ROM, strength, coordination, balance, and proprioception to improve

## 2025-05-19 ENCOUNTER — HOSPITAL ENCOUNTER (OUTPATIENT)
Facility: HOSPITAL | Age: 89
Setting detail: RECURRING SERIES
Discharge: HOME | End: 2025-05-22
Attending: ORTHOPAEDIC SURGERY
Payer: MEDICARE

## 2025-05-19 PROCEDURE — 97140 MANUAL THERAPY 1/> REGIONS: CPT

## 2025-05-19 PROCEDURE — 97530 THERAPEUTIC ACTIVITIES: CPT

## 2025-05-19 PROCEDURE — 97110 THERAPEUTIC EXERCISES: CPT

## 2025-05-19 NOTE — PROGRESS NOTES
PHYSICAL / OCCUPATIONAL THERAPY - DAILY TREATMENT NOTE    Patient Name: Berkley Clarke    Date: 2025    : 1935  Insurance: Payor: MEDICARE / Plan: MEDICARE PART A AND B / Product Type: *No Product type* /      Patient  verified Yes     Visit #   Current / Total  4 16   Time   In / Out 330 420   Pain   In / Out 2 <2 a little better now that you worked on it   Subjective Functional Status/Changes: I am about a 2 today and I have been doing a lot today      TREATMENT AREA =  Glenohumeral arthritis, left  Pain in left shoulder    OBJECTIVE    Modalities Rationale:     decrease pain and increase tissue extensibility to improve patient's ability to progress to PLOF and address remaining functional goals.     min [] Estim Unattended, type/location:                                      []  w/ice    []  w/heat    min [] Estim Attended, type/location:                                     []  w/US     []  w/ice    []  w/heat    []  TENS insruct      min []  Mechanical Traction: type/lbs                   []  pro   []  sup   []  int   []  cont    []  before manual    []  after manual    min []  Ultrasound, settings/location:     10 min  unbill []  Ice     [x]  Heat post   location/position: L shoulder reclined with B LE wedge     min []  Paraffin,  details:     min []  Vasopneumatic Device, press/temp:     min []  Whirlpool / Fluido:    If using vaso (only need to measure limb vaso being performed on)      pre-treatment girth :       post-treatment girth :       measured at (landmark location) :      min []  Other:    Skin assessment post-treatment:   Intact      Therapeutic Procedures:    Tx Min Billable or 1:1 Min (if diff from Tx Min) Procedure, Rationale, Specifics    56031 Therapeutic Exercise (timed):  increase ROM, strength, coordination, balance, and proprioception to improve patient's ability to progress to PLOF and address remaining functional goals. (see flow sheet as applicable)     Details if

## 2025-05-22 ENCOUNTER — APPOINTMENT (OUTPATIENT)
Facility: HOSPITAL | Age: 89
End: 2025-05-22
Attending: ORTHOPAEDIC SURGERY
Payer: MEDICARE

## 2025-05-27 ENCOUNTER — HOSPITAL ENCOUNTER (OUTPATIENT)
Facility: HOSPITAL | Age: 89
Setting detail: RECURRING SERIES
Discharge: HOME | End: 2025-05-30
Attending: ORTHOPAEDIC SURGERY
Payer: MEDICARE

## 2025-05-27 PROCEDURE — 97530 THERAPEUTIC ACTIVITIES: CPT

## 2025-05-27 PROCEDURE — 97110 THERAPEUTIC EXERCISES: CPT

## 2025-05-27 PROCEDURE — 97140 MANUAL THERAPY 1/> REGIONS: CPT

## 2025-05-27 NOTE — PROGRESS NOTES
PHYSICAL / OCCUPATIONAL THERAPY - DAILY TREATMENT NOTE    Patient Name: Berkley Clarke    Date: 2025    : 1935  Insurance: Payor: MEDICARE / Plan: MEDICARE PART A AND B / Product Type: *No Product type* /      Patient  verified Yes     Visit #   Current / Total 5 16   Time   In / Out 134 223   Pain   In / Out 0 0   Subjective Functional Status/Changes: I am doing ok today.      TREATMENT AREA =  Glenohumeral arthritis, left  Pain in left shoulder    OBJECTIVE    Modalities Rationale:     decrease pain and increase tissue extensibility to improve patient's ability to progress to PLOF and address remaining functional goals.     min [] Estim Unattended, type/location:                                      []  w/ice    []  w/heat    min [] Estim Attended, type/location:                                     []  w/US     []  w/ice    []  w/heat    []  TENS insruct      min []  Mechanical Traction: type/lbs                   []  pro   []  sup   []  int   []  cont    []  before manual    []  after manual    min []  Ultrasound, settings/location:     10 min  unbill []  Ice     [x]  Heat  post   location/position:reclined L shoulder     min []  Paraffin,  details:     min []  Vasopneumatic Device, press/temp:     min []  Whirlpool / Fluido:    If using vaso (only need to measure limb vaso being performed on)      pre-treatment girth :       post-treatment girth :       measured at (landmark location) :      min []  Other:    Skin assessment post-treatment:   Intact      Therapeutic Procedures:    Tx Min Billable or 1:1 Min (if diff from Tx Min) Procedure, Rationale, Specifics   16 16 62021 Therapeutic Exercise (timed):  increase ROM, strength, coordination, balance, and proprioception to improve patient's ability to progress to PLOF and address remaining functional goals. (see flow sheet as applicable)     Details if applicable:       8 [phone] [id number] Therapeutic Activity (timed):  use of dynamic activities replicating

## 2025-05-30 ENCOUNTER — HOSPITAL ENCOUNTER (OUTPATIENT)
Facility: HOSPITAL | Age: 89
Setting detail: RECURRING SERIES
End: 2025-05-30
Attending: ORTHOPAEDIC SURGERY
Payer: MEDICARE

## 2025-05-30 PROCEDURE — 97110 THERAPEUTIC EXERCISES: CPT

## 2025-05-30 PROCEDURE — 97530 THERAPEUTIC ACTIVITIES: CPT

## 2025-05-30 PROCEDURE — 97140 MANUAL THERAPY 1/> REGIONS: CPT

## 2025-05-30 NOTE — PROGRESS NOTES
PHYSICAL / OCCUPATIONAL THERAPY - DAILY TREATMENT NOTE    Patient Name: Berkley Clarke    Date: 2025    : 1935  Insurance: Payor: MEDICARE / Plan: MEDICARE PART A AND B / Product Type: *No Product type* /      Patient  verified Yes     Visit #   Current / Total 6 16   Time   In / Out 330 422   Pain   In / Out 2-3 0   Subjective Functional Status/Changes: I think the weather is making it a little more sore today or the weather.      TREATMENT AREA =  Glenohumeral arthritis, left  Pain in left shoulder    OBJECTIVE    Modalities Rationale:     decrease pain and increase tissue extensibility to improve patient's ability to progress to PLOF and address remaining functional goals.     min [] Estim Unattended, type/location:                                      []  w/ice    []  w/heat    min [] Estim Attended, type/location:                                     []  w/US     []  w/ice    []  w/heat    []  TENS insruct      min []  Mechanical Traction: type/lbs                   []  pro   []  sup   []  int   []  cont    []  before manual    []  after manual    min []  Ultrasound, settings/location:     10 min  unbill []  Ice     [x]  Heat post    location/position: reclined L shoulder     min []  Paraffin,  details:     min []  Vasopneumatic Device, press/temp:     min []  Whirlpool / Fluido:    If using vaso (only need to measure limb vaso being performed on)      pre-treatment girth :       post-treatment girth :       measured at (landmark location) :      min []  Other:    Skin assessment post-treatment:   Intact      Therapeutic Procedures:    Tx Min Billable or 1:1 Min (if diff from Tx Min) Procedure, Rationale, Specifics   12  [id number] Therapeutic Exercise (timed):  increase ROM, strength, coordination, balance, and proprioception to improve patient's ability to progress to PLOF and address remaining functional goals. (see flow sheet as applicable)     Details if applicable:       15 15 22516 Therapeutic

## 2025-05-30 NOTE — THERAPY DISCHARGE
[facility]  [address], [address], [place] [address]  Ph:[phone] Fx:[phone]  DISCHARGE SUMMARY  Patient Name: Berkley Clarke : 1935   Medical/Treatment Diagnosis: Glenohumeral arthritis, left  Pain in left shoulder   Referral Source: Tyrell Pedroza,*     Date of Initial Visit: 25 Attended Visits: 6 Missed Visits: 0     SUMMARY OF TREATMENT  Patient seen for eval and 5 follow up sessions. Requests DC to attempt self management, Has exercises for HEP and notes she will call if any questions.     CURRENT STATUS        Short Term Goals: To be accomplished in 4 WEEKS  1 patient will have established and be I with HEP to aid with independence and self management at discharge  EVAL HEP issued at evaluation  Current reports some compliance 25    MET  2 patient will have pain 2/10 to aid with increase tolerance to ADLS and regular daily activities at home and in the community  EVAL 3  Current 2-3    25   PROGRESSING     Long Term Goals: To be accomplished in 8 WEEKS  1 patient will have established and be I with HEP to aid with independence and self management at discharge  EVAL HEP issued at evaluation  Current reports some compliance 25   MET  2 patient will have pain 1/10 to aid with increase tolerance to ADLS and regular daily activities at home and in the community  EVAL 2-3   25   NOT MET/PROGRESSING     3 patient will have DASH 10% to show improvement with ADLS and activities at home and in the community  EVAL 18%  Current 18 % 25     NOT MET/ PROGRESSING   4 patient will have L shoulder AROM F 130 and abd 120 to aid with increase functional use of L UE for housework and gardening.   EVAL L shoulder F 102 and abd 88  Current NA  25   NOT ASSESSED/PROGRESSING         Medicare: Reporting Period (date from last assessment to current assessment): 25- 25    RECOMMENDATIONS  Other: pt request    If

## 2025-05-30 NOTE — THERAPY DISCHARGE
Physical Therapy Discharge Instructions      [facility] - [address], [address]  [place] [address] [phone] fax    Patient: Berkley Clarke  : 1935      Continue Home Exercise Program 1-2 times per -day for 2 weeks, then decrease to 3-5 times per week      Continue with    [] Ice  as needed PRN times per day     [x] Heat           Follow up with MD:     [] Upon completion of therapy     [x] As needed      Recommendations:     [x]   Return to activity with home program    []   Return to activity with the following modifications:       []Post Rehab Program    []Join Independent aquatic program     []Return to/join local gym      Additional Comments:        Renita Velarde, PT 2025 4:28 PM

## 2025-06-17 ENCOUNTER — OFFICE VISIT (OUTPATIENT)
Facility: CLINIC | Age: 89
End: 2025-06-17
Payer: MEDICARE

## 2025-06-17 ENCOUNTER — HOSPITAL ENCOUNTER (OUTPATIENT)
Facility: HOSPITAL | Age: 89
Setting detail: SPECIMEN
Discharge: HOME | End: 2025-06-20
Payer: MEDICARE

## 2025-06-17 VITALS
WEIGHT: 124.2 LBS | BODY MASS INDEX: 19.49 KG/M2 | OXYGEN SATURATION: 97 % | HEART RATE: 67 BPM | TEMPERATURE: 97.3 F | SYSTOLIC BLOOD PRESSURE: 124 MMHG | HEIGHT: 67 IN | DIASTOLIC BLOOD PRESSURE: 70 MMHG

## 2025-06-17 DIAGNOSIS — M79.10 MYALGIA DUE TO STATIN: ICD-10-CM

## 2025-06-17 DIAGNOSIS — T46.6X5A MYALGIA DUE TO STATIN: ICD-10-CM

## 2025-06-17 DIAGNOSIS — M85.89 OSTEOPENIA OF MULTIPLE SITES: ICD-10-CM

## 2025-06-17 DIAGNOSIS — N39.0 RECURRENT UTI: Primary | ICD-10-CM

## 2025-06-17 DIAGNOSIS — E78.49 OTHER HYPERLIPIDEMIA: ICD-10-CM

## 2025-06-17 DIAGNOSIS — N39.0 RECURRENT UTI: ICD-10-CM

## 2025-06-17 DIAGNOSIS — N39.46 MIXED STRESS AND URGE URINARY INCONTINENCE: ICD-10-CM

## 2025-06-17 DIAGNOSIS — N18.31 CKD STAGE 3A, GFR 45-59 ML/MIN (HCC): ICD-10-CM

## 2025-06-17 LAB
APPEARANCE UR: CLEAR
BACTERIA URNS QL MICRO: ABNORMAL /HPF
BILIRUB UR QL: NEGATIVE
COLOR UR: YELLOW
EPITH CASTS URNS QL MICRO: ABNORMAL /LPF (ref 0–5)
GLUCOSE UR STRIP.AUTO-MCNC: NEGATIVE MG/DL
HGB UR QL STRIP: NEGATIVE
KETONES UR QL STRIP.AUTO: NEGATIVE MG/DL
LEUKOCYTE ESTERASE UR QL STRIP.AUTO: ABNORMAL
NITRITE UR QL STRIP.AUTO: NEGATIVE
PH UR STRIP: 7 (ref 5–8)
PROT UR STRIP-MCNC: NEGATIVE MG/DL
RBC #/AREA URNS HPF: ABNORMAL /HPF (ref 0–5)
SP GR UR REFRACTOMETRY: 1.01 (ref 1–1.03)
UROBILINOGEN UR QL STRIP.AUTO: 0.2 EU/DL (ref 0.2–1)
WBC URNS QL MICRO: ABNORMAL /HPF (ref 0–5)

## 2025-06-17 PROCEDURE — G2211 COMPLEX E/M VISIT ADD ON: HCPCS | Performed by: FAMILY MEDICINE

## 2025-06-17 PROCEDURE — 1090F PRES/ABSN URINE INCON ASSESS: CPT | Performed by: FAMILY MEDICINE

## 2025-06-17 PROCEDURE — 99214 OFFICE O/P EST MOD 30 MIN: CPT | Performed by: FAMILY MEDICINE

## 2025-06-17 PROCEDURE — 87086 URINE CULTURE/COLONY COUNT: CPT

## 2025-06-17 PROCEDURE — 0509F URINE INCON PLAN DOCD: CPT | Performed by: FAMILY MEDICINE

## 2025-06-17 PROCEDURE — 1159F MED LIST DOCD IN RCRD: CPT | Performed by: FAMILY MEDICINE

## 2025-06-17 PROCEDURE — 81001 URINALYSIS AUTO W/SCOPE: CPT

## 2025-06-17 PROCEDURE — G8427 DOCREV CUR MEDS BY ELIG CLIN: HCPCS | Performed by: FAMILY MEDICINE

## 2025-06-17 PROCEDURE — 1160F RVW MEDS BY RX/DR IN RCRD: CPT | Performed by: FAMILY MEDICINE

## 2025-06-17 PROCEDURE — G8420 CALC BMI NORM PARAMETERS: HCPCS | Performed by: FAMILY MEDICINE

## 2025-06-17 PROCEDURE — 1123F ACP DISCUSS/DSCN MKR DOCD: CPT | Performed by: FAMILY MEDICINE

## 2025-06-17 PROCEDURE — 1036F TOBACCO NON-USER: CPT | Performed by: FAMILY MEDICINE

## 2025-06-17 RX ORDER — MIRABEGRON 25 MG/1
25 TABLET, FILM COATED, EXTENDED RELEASE ORAL DAILY
COMMUNITY

## 2025-06-17 SDOH — ECONOMIC STABILITY: FOOD INSECURITY: WITHIN THE PAST 12 MONTHS, THE FOOD YOU BOUGHT JUST DIDN'T LAST AND YOU DIDN'T HAVE MONEY TO GET MORE.: NEVER TRUE

## 2025-06-17 SDOH — ECONOMIC STABILITY: FOOD INSECURITY: WITHIN THE PAST 12 MONTHS, YOU WORRIED THAT YOUR FOOD WOULD RUN OUT BEFORE YOU GOT MONEY TO BUY MORE.: NEVER TRUE

## 2025-06-17 ASSESSMENT — PATIENT HEALTH QUESTIONNAIRE - PHQ9
SUM OF ALL RESPONSES TO PHQ QUESTIONS 1-9: 0
SUM OF ALL RESPONSES TO PHQ QUESTIONS 1-9: 0
1. LITTLE INTEREST OR PLEASURE IN DOING THINGS: NOT AT ALL
SUM OF ALL RESPONSES TO PHQ QUESTIONS 1-9: 0
SUM OF ALL RESPONSES TO PHQ QUESTIONS 1-9: 0
2. FEELING DOWN, DEPRESSED OR HOPELESS: NOT AT ALL

## 2025-06-17 NOTE — PROGRESS NOTES
Berkley Clarke (: 1935) is a 89 y.o. female, established patient, here for:    ASSESSMENT/PLAN:  Recurrent UTI  Assessment & Plan:  History of recurrent UTIs with recent symptoms, culture results unavailable. Clinical improvement with ciprofloxacin. Currently feeling well.  No longer seeing Dr. Garcia (uro). Still seeing Dr. Hallman (urogyn) with upcoming appointment.  - Urine culture today  - Results will be discussed with Dr. Hallman during the upcoming appointment.  - Encouraged maintenance of uro/urogyn specialist care +/- ID given hx  Orders:  -     Urinalysis with Microscopic; Future  -     Culture, Urine; Future  CKD stage 3a, GFR 45-59 ml/min ([facility])  Other hyperlipidemia  Assessment & Plan:  Had been initiated due to suspected TIA. Interim establishment with cardiologist, Dr. Herndon, who post workup suspects acute symptoms were not cardiac related.  Myalgias continue with statin (normal Vit D)  - dc statin  - Mrs. Clarke strongly desires continued lipid surveillance to support dietary adherence  - labs before next visit   Orders:  -     Lipid Panel; Future  -     Comprehensive Metabolic Panel; Future  -     CBC with Auto Differential; Future  Myalgia due to statin  Mixed stress and urge urinary incontinence  Assessment & Plan:  Currently taking Myrbetriq for overactive bladder symptoms as prescribed by urogynecologist, Dr. Hallman.  Osteopenia of multiple sites  Assessment & Plan:  Abstracted from Care Everywhere with comment \"Prior fosamax treatment\"   Orders:  -     Vitamin D 25 Hydroxy; Future     Assessment & Plan  1. Recurrent urinary tract infections: History of recurrent UTIs with recent symptoms.  - Urine culture will be conducted to identify potential pathogens.  - Results will be discussed with Dr. Hallman during the upcoming appointment.  - Avoiding straight catheterization to prevent further complications.    2. Memory issues: Confusion noted during infections, common in seniors.  -

## 2025-06-17 NOTE — ASSESSMENT & PLAN NOTE
History of recurrent UTIs with recent symptoms, culture results unavailable. Clinical improvement with ciprofloxacin. Currently feeling well.  No longer seeing Dr. Garcia (uro). Still seeing Dr. Hallman (urogyn) with upcoming appointment.  - Urine culture today  - Results will be discussed with Dr. Hallman during the upcoming appointment.  - Encouraged maintenance of uro/urogyn specialist care +/- ID given hx

## 2025-06-17 NOTE — ASSESSMENT & PLAN NOTE
Currently taking Myrbetriq for overactive bladder symptoms as prescribed by urogynecologist, Dr. Hallman.

## 2025-06-17 NOTE — ASSESSMENT & PLAN NOTE
Had been initiated due to suspected TIA. Interim establishment with cardiologist, Dr. Herndon, who post workup suspects acute symptoms were not cardiac related.  Myalgias continue with statin (normal Vit D)  - dc statin  - Mrs. Clarke strongly desires continued lipid surveillance to support dietary adherence  - labs before next visit

## 2025-06-17 NOTE — PROGRESS NOTES
Chief Complaint   Patient presents with    Vitamin D def    Chronic Kidney Disease    Memory Impairment      Patient here today to be seen for her 6 month follow up and lab review.     Cystitis     Patient's daughter would like to know if an urine culture can be ordered to check for bladder infection patient is not having any symptoms.          Have you been to the ER, urgent care clinic since your last visit?  Hospitalized since your last visit?   YES - When: approximately 1 months ago.  Where and Why: [facility], bladder infection.    Have you seen or consulted any other health care providers outside our system since your last visit?   YES - When: approximately 1 months ago.  Where and Why: Cardiology.

## 2025-06-19 ENCOUNTER — RESULTS FOLLOW-UP (OUTPATIENT)
Facility: CLINIC | Age: 89
End: 2025-06-19

## 2025-06-19 LAB
BACTERIA SPEC CULT: NORMAL
CC UR VC: NORMAL
SERVICE CMNT-IMP: NORMAL

## 2025-06-20 ENCOUNTER — TELEPHONE (OUTPATIENT)
Facility: CLINIC | Age: 89
End: 2025-06-20

## 2025-06-20 NOTE — TELEPHONE ENCOUNTER
Adviosed Pt Dr. Roman no accepting new Pts. Offered NP appt with Dr. Cheema or Dr. Petty. Pt declined at this time

## 2025-06-20 NOTE — TELEPHONE ENCOUNTER
----- Message from ANA LAURA GOLDMAN MA sent at 6/19/2025 11:11 AM EDT -----  Regarding: FW: ECC Appointment Request    ----- Message -----  From: Elvis Jackson  Sent: 6/19/2025  10:59 AM EDT  To: [facility] Clinical Staff  Subject: ECC Appointment Request                          ECC Appointment Request    Patient needs appointment for ECC Appointment Type: New to Provider - establish care - transferring from MD Claudine Bearden    Patient Requested Dates(s): Any day as soon as possible   Patient Requested Time: Late morning appointment   Provider Name: Jeremy Roman MD    Reason for Appointment Request: New Patient - No appointments available during search  --------------------------------------------------------------------------------------------------------------------------    Relationship to Patient: Self     Call Back Information: OK to leave message on voicemail  Preferred Call Back Number: Phone  [phone]

## 2025-06-23 ENCOUNTER — TELEPHONE (OUTPATIENT)
Facility: CLINIC | Age: 89
End: 2025-06-23

## 2025-06-23 NOTE — TELEPHONE ENCOUNTER
----- Message from Claudine DOUGLAS sent at 6/23/2025  3:12 PM EDT -----  Regarding: FW: ECC Appointment Request    ----- Message -----  From: Kendra Davenport  Sent: 6/23/2025   2:35 PM EDT  To: [facility] Clinical Staff  Subject: ECC Appointment Request                          ECC Appointment Request    Patient needs appointment for ECC Appointment Type: New to Provider.    Patient Requested Dates(s): any day  Patient Requested Time: any time  Provider Name: preferred Dr Jeremy Roman    Reason for Appointment Request: Other / no available appt for preferred doctor  --------------------------------------------------------------------------------------------------------------------------    Relationship to Patient: Self     Call Back Information: OK to leave message on voicemail  Preferred Call Back Number: Phone  [phone]

## 2025-07-14 ENCOUNTER — TELEPHONE (OUTPATIENT)
Facility: CLINIC | Age: 89
End: 2025-07-14

## 2025-07-14 NOTE — TELEPHONE ENCOUNTER
----- Message from MISTI POSEY LPN sent at 7/14/2025 11:54 AM EDT -----  Regarding: FW: ECC Appointment Request    ----- Message -----  From: Bailey Shafer  Sent: 7/14/2025  11:48 AM EDT  To: [facility] Clinical Staff  Subject: ECC Appointment Request                          ECC Appointment Request    Patient needs appointment for ECC Appointment Type: New to Provider.    Patient Requested Dates(s): November 2025  Patient Requested Time: morning  Provider Name: Carin Greene MD    Reason for Appointment Request: New Patient - Requested Provider unavailable  --------------------------------------------------------------------------------------------------------------------------    Relationship to Patient: Self     Call Back Information: OK to leave message on voicemail  Preferred Call Back Number: Phone [phone]

## 2025-07-14 NOTE — TELEPHONE ENCOUNTER
Advised Pt dr. Greene is not taking new Pts. Pt did not want to schedule with Dr. Petty or Dr. Cheema.